# Patient Record
Sex: MALE | Race: OTHER | URBAN - METROPOLITAN AREA
[De-identification: names, ages, dates, MRNs, and addresses within clinical notes are randomized per-mention and may not be internally consistent; named-entity substitution may affect disease eponyms.]

---

## 2023-01-01 ENCOUNTER — EMERGENCY (EMERGENCY)
Facility: HOSPITAL | Age: 88
LOS: 0 days | Discharge: ANOTHER HOSPITAL | End: 2023-12-13
Attending: STUDENT IN AN ORGANIZED HEALTH CARE EDUCATION/TRAINING PROGRAM
Payer: SELF-PAY

## 2023-01-01 ENCOUNTER — INPATIENT (INPATIENT)
Facility: HOSPITAL | Age: 88
LOS: 4 days | End: 2023-12-18
Attending: STUDENT IN AN ORGANIZED HEALTH CARE EDUCATION/TRAINING PROGRAM | Admitting: STUDENT IN AN ORGANIZED HEALTH CARE EDUCATION/TRAINING PROGRAM
Payer: SELF-PAY

## 2023-01-01 VITALS
DIASTOLIC BLOOD PRESSURE: 87 MMHG | RESPIRATION RATE: 18 BRPM | SYSTOLIC BLOOD PRESSURE: 166 MMHG | OXYGEN SATURATION: 100 % | HEART RATE: 108 BPM

## 2023-01-01 VITALS — HEIGHT: 62 IN

## 2023-01-01 VITALS
DIASTOLIC BLOOD PRESSURE: 56 MMHG | HEART RATE: 157 BPM | OXYGEN SATURATION: 100 % | RESPIRATION RATE: 17 BRPM | SYSTOLIC BLOOD PRESSURE: 82 MMHG | WEIGHT: 164.91 LBS | HEIGHT: 62 IN | TEMPERATURE: 98 F

## 2023-01-01 DIAGNOSIS — I46.9 CARDIAC ARREST, CAUSE UNSPECIFIED: ICD-10-CM

## 2023-01-01 DIAGNOSIS — Z20.822 CONTACT WITH AND (SUSPECTED) EXPOSURE TO COVID-19: ICD-10-CM

## 2023-01-01 DIAGNOSIS — E78.2 MIXED HYPERLIPIDEMIA: ICD-10-CM

## 2023-01-01 DIAGNOSIS — Z87.19 PERSONAL HISTORY OF OTHER DISEASES OF THE DIGESTIVE SYSTEM: Chronic | ICD-10-CM

## 2023-01-01 DIAGNOSIS — F17.210 NICOTINE DEPENDENCE, CIGARETTES, UNCOMPLICATED: ICD-10-CM

## 2023-01-01 DIAGNOSIS — R40.4 TRANSIENT ALTERATION OF AWARENESS: ICD-10-CM

## 2023-01-01 DIAGNOSIS — E11.10 TYPE 2 DIABETES MELLITUS WITH KETOACIDOSIS WITHOUT COMA: ICD-10-CM

## 2023-01-01 DIAGNOSIS — I48.91 UNSPECIFIED ATRIAL FIBRILLATION: ICD-10-CM

## 2023-01-01 DIAGNOSIS — E78.5 HYPERLIPIDEMIA, UNSPECIFIED: ICD-10-CM

## 2023-01-01 DIAGNOSIS — E11.9 TYPE 2 DIABETES MELLITUS WITHOUT COMPLICATIONS: ICD-10-CM

## 2023-01-01 DIAGNOSIS — I10 ESSENTIAL (PRIMARY) HYPERTENSION: ICD-10-CM

## 2023-01-01 LAB
A1C WITH ESTIMATED AVERAGE GLUCOSE RESULT: 5.6 % — SIGNIFICANT CHANGE UP (ref 4–5.6)
A1C WITH ESTIMATED AVERAGE GLUCOSE RESULT: 5.6 % — SIGNIFICANT CHANGE UP (ref 4–5.6)
A1C WITH ESTIMATED AVERAGE GLUCOSE RESULT: 5.7 % — HIGH (ref 4–5.6)
A1C WITH ESTIMATED AVERAGE GLUCOSE RESULT: 5.7 % — HIGH (ref 4–5.6)
ACANTHOCYTES BLD QL SMEAR: SLIGHT — SIGNIFICANT CHANGE UP
ACANTHOCYTES BLD QL SMEAR: SLIGHT — SIGNIFICANT CHANGE UP
ALBUMIN SERPL ELPH-MCNC: 2.9 G/DL — LOW (ref 3.3–5)
ALBUMIN SERPL ELPH-MCNC: 2.9 G/DL — LOW (ref 3.3–5)
ALBUMIN SERPL ELPH-MCNC: 3.1 G/DL — LOW (ref 3.3–5)
ALBUMIN SERPL ELPH-MCNC: 3.2 G/DL — LOW (ref 3.3–5)
ALBUMIN SERPL ELPH-MCNC: 3.2 G/DL — LOW (ref 3.3–5)
ALBUMIN SERPL ELPH-MCNC: 3.4 G/DL — SIGNIFICANT CHANGE UP (ref 3.3–5)
ALBUMIN SERPL ELPH-MCNC: 3.5 G/DL — SIGNIFICANT CHANGE UP (ref 3.3–5)
ALBUMIN SERPL ELPH-MCNC: 3.5 G/DL — SIGNIFICANT CHANGE UP (ref 3.3–5)
ALP SERPL-CCNC: 33 U/L — LOW (ref 40–120)
ALP SERPL-CCNC: 33 U/L — LOW (ref 40–120)
ALP SERPL-CCNC: 34 U/L — LOW (ref 40–120)
ALP SERPL-CCNC: 34 U/L — LOW (ref 40–120)
ALP SERPL-CCNC: 36 U/L — LOW (ref 40–120)
ALP SERPL-CCNC: 36 U/L — LOW (ref 40–120)
ALP SERPL-CCNC: 54 U/L — SIGNIFICANT CHANGE UP (ref 40–120)
ALP SERPL-CCNC: 54 U/L — SIGNIFICANT CHANGE UP (ref 40–120)
ALP SERPL-CCNC: 55 U/L — SIGNIFICANT CHANGE UP (ref 40–120)
ALP SERPL-CCNC: 55 U/L — SIGNIFICANT CHANGE UP (ref 40–120)
ALP SERPL-CCNC: 63 U/L — SIGNIFICANT CHANGE UP (ref 40–120)
ALP SERPL-CCNC: 63 U/L — SIGNIFICANT CHANGE UP (ref 40–120)
ALP SERPL-CCNC: 65 U/L — SIGNIFICANT CHANGE UP (ref 40–120)
ALP SERPL-CCNC: 65 U/L — SIGNIFICANT CHANGE UP (ref 40–120)
ALP SERPL-CCNC: 66 U/L — SIGNIFICANT CHANGE UP (ref 40–120)
ALP SERPL-CCNC: 66 U/L — SIGNIFICANT CHANGE UP (ref 40–120)
ALP SERPL-CCNC: 81 U/L — SIGNIFICANT CHANGE UP (ref 40–120)
ALP SERPL-CCNC: 81 U/L — SIGNIFICANT CHANGE UP (ref 40–120)
ALT FLD-CCNC: 123 U/L — HIGH (ref 4–41)
ALT FLD-CCNC: 123 U/L — HIGH (ref 4–41)
ALT FLD-CCNC: 1457 U/L — HIGH (ref 4–41)
ALT FLD-CCNC: 1457 U/L — HIGH (ref 4–41)
ALT FLD-CCNC: 163 U/L — HIGH (ref 4–41)
ALT FLD-CCNC: 163 U/L — HIGH (ref 4–41)
ALT FLD-CCNC: 21 U/L — SIGNIFICANT CHANGE UP (ref 12–78)
ALT FLD-CCNC: 21 U/L — SIGNIFICANT CHANGE UP (ref 12–78)
ALT FLD-CCNC: 221 U/L — HIGH (ref 4–41)
ALT FLD-CCNC: 221 U/L — HIGH (ref 4–41)
ALT FLD-CCNC: 47 U/L — HIGH (ref 4–41)
ALT FLD-CCNC: 47 U/L — HIGH (ref 4–41)
ALT FLD-CCNC: 53 U/L — HIGH (ref 4–41)
ALT FLD-CCNC: 53 U/L — HIGH (ref 4–41)
ALT FLD-CCNC: 74 U/L — HIGH (ref 4–41)
ALT FLD-CCNC: 74 U/L — HIGH (ref 4–41)
ALT FLD-CCNC: 96 U/L — HIGH (ref 4–41)
ALT FLD-CCNC: 96 U/L — HIGH (ref 4–41)
ANION GAP SERPL CALC-SCNC: 11 MMOL/L — SIGNIFICANT CHANGE UP (ref 7–14)
ANION GAP SERPL CALC-SCNC: 11 MMOL/L — SIGNIFICANT CHANGE UP (ref 7–14)
ANION GAP SERPL CALC-SCNC: 13 MMOL/L — SIGNIFICANT CHANGE UP (ref 7–14)
ANION GAP SERPL CALC-SCNC: 13 MMOL/L — SIGNIFICANT CHANGE UP (ref 7–14)
ANION GAP SERPL CALC-SCNC: 14 MMOL/L — SIGNIFICANT CHANGE UP (ref 7–14)
ANION GAP SERPL CALC-SCNC: 15 MMOL/L — HIGH (ref 7–14)
ANION GAP SERPL CALC-SCNC: 15 MMOL/L — SIGNIFICANT CHANGE UP (ref 5–17)
ANION GAP SERPL CALC-SCNC: 15 MMOL/L — SIGNIFICANT CHANGE UP (ref 5–17)
ANION GAP SERPL CALC-SCNC: 16 MMOL/L — HIGH (ref 7–14)
ANION GAP SERPL CALC-SCNC: 17 MMOL/L — HIGH (ref 7–14)
ANION GAP SERPL CALC-SCNC: 18 MMOL/L — HIGH (ref 7–14)
ANION GAP SERPL CALC-SCNC: 18 MMOL/L — HIGH (ref 7–14)
ANION GAP SERPL CALC-SCNC: 19 MMOL/L — HIGH (ref 7–14)
ANION GAP SERPL CALC-SCNC: 19 MMOL/L — HIGH (ref 7–14)
ANION GAP SERPL CALC-SCNC: 20 MMOL/L — HIGH (ref 7–14)
ANION GAP SERPL CALC-SCNC: 20 MMOL/L — HIGH (ref 7–14)
ANION GAP SERPL CALC-SCNC: 22 MMOL/L — HIGH (ref 7–14)
ANION GAP SERPL CALC-SCNC: 22 MMOL/L — HIGH (ref 7–14)
ANION GAP SERPL CALC-SCNC: 25 MMOL/L — HIGH (ref 7–14)
ANION GAP SERPL CALC-SCNC: 25 MMOL/L — HIGH (ref 7–14)
ANION GAP SERPL CALC-SCNC: 32 MMOL/L — HIGH (ref 7–14)
ANION GAP SERPL CALC-SCNC: 32 MMOL/L — HIGH (ref 7–14)
ANISOCYTOSIS BLD QL: SLIGHT — SIGNIFICANT CHANGE UP
ANISOCYTOSIS BLD QL: SLIGHT — SIGNIFICANT CHANGE UP
APPEARANCE UR: CLEAR — SIGNIFICANT CHANGE UP
APTT BLD: 30.2 SEC — SIGNIFICANT CHANGE UP (ref 24.5–35.6)
APTT BLD: 30.2 SEC — SIGNIFICANT CHANGE UP (ref 24.5–35.6)
APTT BLD: 30.6 SEC — SIGNIFICANT CHANGE UP (ref 24.5–35.6)
APTT BLD: 30.6 SEC — SIGNIFICANT CHANGE UP (ref 24.5–35.6)
APTT BLD: 64.7 SEC — HIGH (ref 24.5–35.6)
APTT BLD: 64.7 SEC — HIGH (ref 24.5–35.6)
AST SERPL-CCNC: 109 U/L — HIGH (ref 4–40)
AST SERPL-CCNC: 109 U/L — HIGH (ref 4–40)
AST SERPL-CCNC: 2053 U/L — HIGH (ref 4–40)
AST SERPL-CCNC: 2053 U/L — HIGH (ref 4–40)
AST SERPL-CCNC: 231 U/L — HIGH (ref 4–40)
AST SERPL-CCNC: 231 U/L — HIGH (ref 4–40)
AST SERPL-CCNC: 259 U/L — HIGH (ref 4–40)
AST SERPL-CCNC: 259 U/L — HIGH (ref 4–40)
AST SERPL-CCNC: 304 U/L — HIGH (ref 4–40)
AST SERPL-CCNC: 304 U/L — HIGH (ref 4–40)
AST SERPL-CCNC: 41 U/L — HIGH (ref 4–40)
AST SERPL-CCNC: 41 U/L — HIGH (ref 4–40)
AST SERPL-CCNC: 43 U/L — HIGH (ref 4–40)
AST SERPL-CCNC: 43 U/L — HIGH (ref 4–40)
AST SERPL-CCNC: 48 U/L — HIGH (ref 15–37)
AST SERPL-CCNC: 48 U/L — HIGH (ref 15–37)
AST SERPL-CCNC: 65 U/L — HIGH (ref 4–40)
AST SERPL-CCNC: 65 U/L — HIGH (ref 4–40)
B PERT DNA SPEC QL NAA+PROBE: SIGNIFICANT CHANGE UP
B PERT+PARAPERT DNA PNL SPEC NAA+PROBE: SIGNIFICANT CHANGE UP
B-OH-BUTYR SERPL-SCNC: 0.4 MMOL/L — SIGNIFICANT CHANGE UP (ref 0–0.4)
B-OH-BUTYR SERPL-SCNC: 0.5 MMOL/L — HIGH (ref 0–0.4)
B-OH-BUTYR SERPL-SCNC: 0.5 MMOL/L — HIGH (ref 0–0.4)
B-OH-BUTYR SERPL-SCNC: 0.8 MMOL/L — HIGH (ref 0–0.4)
B-OH-BUTYR SERPL-SCNC: 0.8 MMOL/L — HIGH (ref 0–0.4)
B-OH-BUTYR SERPL-SCNC: 0.9 MMOL/L — HIGH (ref 0–0.4)
B-OH-BUTYR SERPL-SCNC: 0.9 MMOL/L — HIGH (ref 0–0.4)
B-OH-BUTYR SERPL-SCNC: 1.1 MMOL/L — HIGH (ref 0–0.4)
B-OH-BUTYR SERPL-SCNC: 1.2 MMOL/L — HIGH (ref 0–0.4)
B-OH-BUTYR SERPL-SCNC: 1.4 MMOL/L — HIGH (ref 0–0.4)
B-OH-BUTYR SERPL-SCNC: 1.4 MMOL/L — HIGH (ref 0–0.4)
B-OH-BUTYR SERPL-SCNC: 1.6 MMOL/L — HIGH (ref 0–0.4)
B-OH-BUTYR SERPL-SCNC: 1.6 MMOL/L — HIGH (ref 0–0.4)
B-OH-BUTYR SERPL-SCNC: 1.8 MMOL/L — HIGH (ref 0–0.4)
B-OH-BUTYR SERPL-SCNC: 2 MMOL/L — HIGH (ref 0–0.4)
B-OH-BUTYR SERPL-SCNC: 2 MMOL/L — HIGH (ref 0–0.4)
B-OH-BUTYR SERPL-SCNC: 3.5 MMOL/L — HIGH (ref 0–0.4)
B-OH-BUTYR SERPL-SCNC: 3.5 MMOL/L — HIGH (ref 0–0.4)
B-OH-BUTYR SERPL-SCNC: <0 MMOL/L — SIGNIFICANT CHANGE UP (ref 0–0.4)
BACTERIA # UR AUTO: NEGATIVE /HPF — SIGNIFICANT CHANGE UP
BASE EXCESS BLDA CALC-SCNC: -16.4 MMOL/L — LOW (ref -2–3)
BASE EXCESS BLDA CALC-SCNC: -16.4 MMOL/L — LOW (ref -2–3)
BASE EXCESS BLDA CALC-SCNC: -9.9 MMOL/L — LOW (ref -2–3)
BASOPHILS # BLD AUTO: 0 K/UL — SIGNIFICANT CHANGE UP (ref 0–0.2)
BASOPHILS # BLD AUTO: 0 K/UL — SIGNIFICANT CHANGE UP (ref 0–0.2)
BASOPHILS # BLD AUTO: 0.01 K/UL — SIGNIFICANT CHANGE UP (ref 0–0.2)
BASOPHILS # BLD AUTO: 0.01 K/UL — SIGNIFICANT CHANGE UP (ref 0–0.2)
BASOPHILS # BLD AUTO: 0.02 K/UL — SIGNIFICANT CHANGE UP (ref 0–0.2)
BASOPHILS # BLD AUTO: 0.03 K/UL — SIGNIFICANT CHANGE UP (ref 0–0.2)
BASOPHILS # BLD AUTO: 0.03 K/UL — SIGNIFICANT CHANGE UP (ref 0–0.2)
BASOPHILS # BLD AUTO: 0.04 K/UL — SIGNIFICANT CHANGE UP (ref 0–0.2)
BASOPHILS # BLD AUTO: 0.04 K/UL — SIGNIFICANT CHANGE UP (ref 0–0.2)
BASOPHILS NFR BLD AUTO: 0 % — SIGNIFICANT CHANGE UP (ref 0–2)
BASOPHILS NFR BLD AUTO: 0 % — SIGNIFICANT CHANGE UP (ref 0–2)
BASOPHILS NFR BLD AUTO: 0.1 % — SIGNIFICANT CHANGE UP (ref 0–2)
BASOPHILS NFR BLD AUTO: 0.1 % — SIGNIFICANT CHANGE UP (ref 0–2)
BASOPHILS NFR BLD AUTO: 0.2 % — SIGNIFICANT CHANGE UP (ref 0–2)
BASOPHILS NFR BLD AUTO: 0.2 % — SIGNIFICANT CHANGE UP (ref 0–2)
BASOPHILS NFR BLD AUTO: 0.3 % — SIGNIFICANT CHANGE UP (ref 0–2)
BILIRUB DIRECT SERPL-MCNC: 0.2 MG/DL — SIGNIFICANT CHANGE UP (ref 0–0.3)
BILIRUB DIRECT SERPL-MCNC: 0.2 MG/DL — SIGNIFICANT CHANGE UP (ref 0–0.3)
BILIRUB INDIRECT FLD-MCNC: 0.2 MG/DL — SIGNIFICANT CHANGE UP (ref 0–1)
BILIRUB INDIRECT FLD-MCNC: 0.2 MG/DL — SIGNIFICANT CHANGE UP (ref 0–1)
BILIRUB SERPL-MCNC: 0.4 MG/DL — SIGNIFICANT CHANGE UP (ref 0.2–1.2)
BILIRUB SERPL-MCNC: 0.5 MG/DL — SIGNIFICANT CHANGE UP (ref 0.2–1.2)
BILIRUB SERPL-MCNC: 0.5 MG/DL — SIGNIFICANT CHANGE UP (ref 0.2–1.2)
BILIRUB SERPL-MCNC: 0.7 MG/DL — SIGNIFICANT CHANGE UP (ref 0.2–1.2)
BILIRUB SERPL-MCNC: 0.8 MG/DL — SIGNIFICANT CHANGE UP (ref 0.2–1.2)
BILIRUB SERPL-MCNC: 0.8 MG/DL — SIGNIFICANT CHANGE UP (ref 0.2–1.2)
BILIRUB UR-MCNC: NEGATIVE — SIGNIFICANT CHANGE UP
BLD GP AB SCN SERPL QL: NEGATIVE — SIGNIFICANT CHANGE UP
BLD GP AB SCN SERPL QL: NEGATIVE — SIGNIFICANT CHANGE UP
BLOOD GAS ARTERIAL - LYTES,HGB,ICA,LACT RESULT: SIGNIFICANT CHANGE UP
BLOOD GAS ARTERIAL - LYTES,HGB,ICA,LACT RESULT: SIGNIFICANT CHANGE UP
BLOOD GAS ARTERIAL COMPREHENSIVE RESULT: SIGNIFICANT CHANGE UP
BLOOD GAS COMMENTS ARTERIAL: SIGNIFICANT CHANGE UP
BLOOD GAS VENOUS COMPREHENSIVE RESULT: SIGNIFICANT CHANGE UP
BLOOD GAS VENOUS COMPREHENSIVE RESULT: SIGNIFICANT CHANGE UP
BORDETELLA PARAPERTUSSIS (RAPRVP): SIGNIFICANT CHANGE UP
BUN SERPL-MCNC: 21 MG/DL — SIGNIFICANT CHANGE UP (ref 7–23)
BUN SERPL-MCNC: 21 MG/DL — SIGNIFICANT CHANGE UP (ref 7–23)
BUN SERPL-MCNC: 22 MG/DL — SIGNIFICANT CHANGE UP (ref 7–23)
BUN SERPL-MCNC: 22 MG/DL — SIGNIFICANT CHANGE UP (ref 7–23)
BUN SERPL-MCNC: 23 MG/DL — SIGNIFICANT CHANGE UP (ref 7–23)
BUN SERPL-MCNC: 24 MG/DL — HIGH (ref 7–23)
BUN SERPL-MCNC: 25 MG/DL — HIGH (ref 7–23)
BUN SERPL-MCNC: 26 MG/DL — HIGH (ref 7–23)
BUN SERPL-MCNC: 27 MG/DL — HIGH (ref 7–23)
BUN SERPL-MCNC: 27 MG/DL — HIGH (ref 7–23)
BUN SERPL-MCNC: 28 MG/DL — HIGH (ref 7–23)
BUN SERPL-MCNC: 28 MG/DL — HIGH (ref 7–23)
BUN SERPL-MCNC: 30 MG/DL — HIGH (ref 7–23)
BUN SERPL-MCNC: 30 MG/DL — HIGH (ref 7–23)
BUN SERPL-MCNC: 33 MG/DL — HIGH (ref 7–23)
BUN SERPL-MCNC: 33 MG/DL — HIGH (ref 7–23)
BUN SERPL-MCNC: 35 MG/DL — HIGH (ref 7–23)
BUN SERPL-MCNC: 35 MG/DL — HIGH (ref 7–23)
BUN SERPL-MCNC: 37 MG/DL — HIGH (ref 7–23)
BUN SERPL-MCNC: 37 MG/DL — HIGH (ref 7–23)
BUN SERPL-MCNC: 38 MG/DL — HIGH (ref 7–23)
BUN SERPL-MCNC: 38 MG/DL — HIGH (ref 7–23)
BUN SERPL-MCNC: 43 MG/DL — HIGH (ref 7–23)
BUN SERPL-MCNC: 43 MG/DL — HIGH (ref 7–23)
C PNEUM DNA SPEC QL NAA+PROBE: SIGNIFICANT CHANGE UP
CA-I BLD-SCNC: 1.16 MMOL/L — SIGNIFICANT CHANGE UP (ref 1.15–1.29)
CA-I BLD-SCNC: 1.16 MMOL/L — SIGNIFICANT CHANGE UP (ref 1.15–1.29)
CA-I BLD-SCNC: 1.2 MMOL/L — SIGNIFICANT CHANGE UP (ref 1.15–1.29)
CA-I BLD-SCNC: 1.2 MMOL/L — SIGNIFICANT CHANGE UP (ref 1.15–1.29)
CA-I BLD-SCNC: 1.22 MMOL/L — SIGNIFICANT CHANGE UP (ref 1.15–1.29)
CA-I BLD-SCNC: 1.23 MMOL/L — SIGNIFICANT CHANGE UP (ref 1.15–1.29)
CA-I BLD-SCNC: 1.23 MMOL/L — SIGNIFICANT CHANGE UP (ref 1.15–1.29)
CA-I BLD-SCNC: 1.25 MMOL/L — SIGNIFICANT CHANGE UP (ref 1.15–1.29)
CA-I BLD-SCNC: 1.25 MMOL/L — SIGNIFICANT CHANGE UP (ref 1.15–1.29)
CA-I BLD-SCNC: 1.36 MMOL/L — HIGH (ref 1.15–1.29)
CA-I BLD-SCNC: 1.36 MMOL/L — HIGH (ref 1.15–1.29)
CALCIUM SERPL-MCNC: 10.7 MG/DL — HIGH (ref 8.4–10.5)
CALCIUM SERPL-MCNC: 10.7 MG/DL — HIGH (ref 8.4–10.5)
CALCIUM SERPL-MCNC: 8.6 MG/DL — SIGNIFICANT CHANGE UP (ref 8.4–10.5)
CALCIUM SERPL-MCNC: 8.6 MG/DL — SIGNIFICANT CHANGE UP (ref 8.4–10.5)
CALCIUM SERPL-MCNC: 8.7 MG/DL — SIGNIFICANT CHANGE UP (ref 8.4–10.5)
CALCIUM SERPL-MCNC: 8.8 MG/DL — SIGNIFICANT CHANGE UP (ref 8.4–10.5)
CALCIUM SERPL-MCNC: 8.9 MG/DL — SIGNIFICANT CHANGE UP (ref 8.4–10.5)
CALCIUM SERPL-MCNC: 9 MG/DL — SIGNIFICANT CHANGE UP (ref 8.4–10.5)
CALCIUM SERPL-MCNC: 9.1 MG/DL — SIGNIFICANT CHANGE UP (ref 8.4–10.5)
CALCIUM SERPL-MCNC: 9.2 MG/DL — SIGNIFICANT CHANGE UP (ref 8.4–10.5)
CALCIUM SERPL-MCNC: 9.3 MG/DL — SIGNIFICANT CHANGE UP (ref 8.4–10.5)
CALCIUM SERPL-MCNC: 9.9 MG/DL — SIGNIFICANT CHANGE UP (ref 8.5–10.1)
CALCIUM SERPL-MCNC: 9.9 MG/DL — SIGNIFICANT CHANGE UP (ref 8.5–10.1)
CAST: 15 /LPF — HIGH (ref 0–4)
CHLORIDE SERPL-SCNC: 104 MMOL/L — SIGNIFICANT CHANGE UP (ref 98–107)
CHLORIDE SERPL-SCNC: 105 MMOL/L — SIGNIFICANT CHANGE UP (ref 96–108)
CHLORIDE SERPL-SCNC: 105 MMOL/L — SIGNIFICANT CHANGE UP (ref 96–108)
CHLORIDE SERPL-SCNC: 105 MMOL/L — SIGNIFICANT CHANGE UP (ref 98–107)
CHLORIDE SERPL-SCNC: 106 MMOL/L — SIGNIFICANT CHANGE UP (ref 98–107)
CHLORIDE SERPL-SCNC: 107 MMOL/L — SIGNIFICANT CHANGE UP (ref 98–107)
CHLORIDE SERPL-SCNC: 108 MMOL/L — HIGH (ref 98–107)
CHLORIDE SERPL-SCNC: 109 MMOL/L — HIGH (ref 98–107)
CHLORIDE SERPL-SCNC: 110 MMOL/L — HIGH (ref 98–107)
CHLORIDE SERPL-SCNC: 111 MMOL/L — HIGH (ref 98–107)
CHLORIDE UR-SCNC: 51 MMOL/L — SIGNIFICANT CHANGE UP
CHOLEST SERPL-MCNC: 123 MG/DL — SIGNIFICANT CHANGE UP
CHOLEST SERPL-MCNC: 123 MG/DL — SIGNIFICANT CHANGE UP
CK MB BLD-MCNC: 1.8 % — SIGNIFICANT CHANGE UP (ref 0–2.5)
CK MB BLD-MCNC: 1.8 % — SIGNIFICANT CHANGE UP (ref 0–2.5)
CK MB BLD-MCNC: 3.9 % — HIGH (ref 0–3.5)
CK MB BLD-MCNC: 3.9 % — HIGH (ref 0–3.5)
CK MB BLD-MCNC: 4.1 % — HIGH (ref 0–2.5)
CK MB BLD-MCNC: 4.1 % — HIGH (ref 0–2.5)
CK MB BLD-MCNC: 4.9 % — HIGH (ref 0–2.5)
CK MB BLD-MCNC: 4.9 % — HIGH (ref 0–2.5)
CK MB CFR SERPL CALC: 12.1 NG/ML — HIGH (ref 0.5–3.6)
CK MB CFR SERPL CALC: 12.1 NG/ML — HIGH (ref 0.5–3.6)
CK MB CFR SERPL CALC: 22.3 NG/ML — HIGH
CK MB CFR SERPL CALC: 22.3 NG/ML — HIGH
CK MB CFR SERPL CALC: 25.5 NG/ML — HIGH
CK MB CFR SERPL CALC: 25.5 NG/ML — HIGH
CK MB CFR SERPL CALC: 33.3 NG/ML — HIGH
CK MB CFR SERPL CALC: 33.3 NG/ML — HIGH
CK MB CFR SERPL CALC: 5.8 NG/ML — SIGNIFICANT CHANGE UP
CK MB CFR SERPL CALC: 5.8 NG/ML — SIGNIFICANT CHANGE UP
CK SERPL-CCNC: 308 U/L — SIGNIFICANT CHANGE UP (ref 26–308)
CK SERPL-CCNC: 308 U/L — SIGNIFICANT CHANGE UP (ref 26–308)
CK SERPL-CCNC: 315 U/L — HIGH (ref 30–200)
CK SERPL-CCNC: 315 U/L — HIGH (ref 30–200)
CK SERPL-CCNC: 539 U/L — HIGH (ref 30–200)
CK SERPL-CCNC: 539 U/L — HIGH (ref 30–200)
CK SERPL-CCNC: 682 U/L — HIGH (ref 30–200)
CK SERPL-CCNC: 682 U/L — HIGH (ref 30–200)
CK SERPL-CCNC: 888 U/L — HIGH (ref 30–200)
CK SERPL-CCNC: 888 U/L — HIGH (ref 30–200)
CO2 BLDA-SCNC: 12 MMOL/L — LOW (ref 19–24)
CO2 SERPL-SCNC: 11 MMOL/L — LOW (ref 22–31)
CO2 SERPL-SCNC: 12 MMOL/L — LOW (ref 22–31)
CO2 SERPL-SCNC: 12 MMOL/L — LOW (ref 22–31)
CO2 SERPL-SCNC: 13 MMOL/L — LOW (ref 22–31)
CO2 SERPL-SCNC: 13 MMOL/L — LOW (ref 22–31)
CO2 SERPL-SCNC: 14 MMOL/L — LOW (ref 22–31)
CO2 SERPL-SCNC: 15 MMOL/L — LOW (ref 22–31)
CO2 SERPL-SCNC: 16 MMOL/L — LOW (ref 22–31)
CO2 SERPL-SCNC: 17 MMOL/L — LOW (ref 22–31)
CO2 SERPL-SCNC: 18 MMOL/L — LOW (ref 22–31)
CO2 SERPL-SCNC: 18 MMOL/L — LOW (ref 22–31)
CO2 SERPL-SCNC: 19 MMOL/L — LOW (ref 22–31)
CO2 SERPL-SCNC: 19 MMOL/L — LOW (ref 22–31)
CO2 SERPL-SCNC: 7 MMOL/L — CRITICAL LOW (ref 22–31)
CO2 SERPL-SCNC: 7 MMOL/L — CRITICAL LOW (ref 22–31)
COLOR SPEC: YELLOW — SIGNIFICANT CHANGE UP
CORTIS F PM SERPL-MCNC: 28.5 UG/DL — HIGH (ref 2.7–10.5)
CORTIS F PM SERPL-MCNC: 28.5 UG/DL — HIGH (ref 2.7–10.5)
CREAT SERPL-MCNC: 1.82 MG/DL — HIGH (ref 0.5–1.3)
CREAT SERPL-MCNC: 1.82 MG/DL — HIGH (ref 0.5–1.3)
CREAT SERPL-MCNC: 1.85 MG/DL — HIGH (ref 0.5–1.3)
CREAT SERPL-MCNC: 1.85 MG/DL — HIGH (ref 0.5–1.3)
CREAT SERPL-MCNC: 1.87 MG/DL — HIGH (ref 0.5–1.3)
CREAT SERPL-MCNC: 1.87 MG/DL — HIGH (ref 0.5–1.3)
CREAT SERPL-MCNC: 1.88 MG/DL — HIGH (ref 0.5–1.3)
CREAT SERPL-MCNC: 1.88 MG/DL — HIGH (ref 0.5–1.3)
CREAT SERPL-MCNC: 1.91 MG/DL — HIGH (ref 0.5–1.3)
CREAT SERPL-MCNC: 1.91 MG/DL — HIGH (ref 0.5–1.3)
CREAT SERPL-MCNC: 2.05 MG/DL — HIGH (ref 0.5–1.3)
CREAT SERPL-MCNC: 2.05 MG/DL — HIGH (ref 0.5–1.3)
CREAT SERPL-MCNC: 2.1 MG/DL — HIGH (ref 0.5–1.3)
CREAT SERPL-MCNC: 2.1 MG/DL — HIGH (ref 0.5–1.3)
CREAT SERPL-MCNC: 2.12 MG/DL — HIGH (ref 0.5–1.3)
CREAT SERPL-MCNC: 2.12 MG/DL — HIGH (ref 0.5–1.3)
CREAT SERPL-MCNC: 2.15 MG/DL — HIGH (ref 0.5–1.3)
CREAT SERPL-MCNC: 2.15 MG/DL — HIGH (ref 0.5–1.3)
CREAT SERPL-MCNC: 2.19 MG/DL — HIGH (ref 0.5–1.3)
CREAT SERPL-MCNC: 2.19 MG/DL — HIGH (ref 0.5–1.3)
CREAT SERPL-MCNC: 2.22 MG/DL — HIGH (ref 0.5–1.3)
CREAT SERPL-MCNC: 2.22 MG/DL — HIGH (ref 0.5–1.3)
CREAT SERPL-MCNC: 2.37 MG/DL — HIGH (ref 0.5–1.3)
CREAT SERPL-MCNC: 2.37 MG/DL — HIGH (ref 0.5–1.3)
CREAT SERPL-MCNC: 2.38 MG/DL — HIGH (ref 0.5–1.3)
CREAT SERPL-MCNC: 2.38 MG/DL — HIGH (ref 0.5–1.3)
CREAT SERPL-MCNC: 2.4 MG/DL — HIGH (ref 0.5–1.3)
CREAT SERPL-MCNC: 2.49 MG/DL — HIGH (ref 0.5–1.3)
CREAT SERPL-MCNC: 2.49 MG/DL — HIGH (ref 0.5–1.3)
CREAT SERPL-MCNC: 2.53 MG/DL — HIGH (ref 0.5–1.3)
CREAT SERPL-MCNC: 2.61 MG/DL — HIGH (ref 0.5–1.3)
CREAT SERPL-MCNC: 2.61 MG/DL — HIGH (ref 0.5–1.3)
CREAT SERPL-MCNC: 2.62 MG/DL — HIGH (ref 0.5–1.3)
CREAT SERPL-MCNC: 2.62 MG/DL — HIGH (ref 0.5–1.3)
CREAT SERPL-MCNC: 2.66 MG/DL — HIGH (ref 0.5–1.3)
CREAT SERPL-MCNC: 2.66 MG/DL — HIGH (ref 0.5–1.3)
CREAT SERPL-MCNC: 2.73 MG/DL — HIGH (ref 0.5–1.3)
CREAT SERPL-MCNC: 2.73 MG/DL — HIGH (ref 0.5–1.3)
CREAT SERPL-MCNC: 2.84 MG/DL — HIGH (ref 0.5–1.3)
CREAT SERPL-MCNC: 2.84 MG/DL — HIGH (ref 0.5–1.3)
CREAT SERPL-MCNC: 3.35 MG/DL — HIGH (ref 0.5–1.3)
CREAT SERPL-MCNC: 3.35 MG/DL — HIGH (ref 0.5–1.3)
CULTURE RESULTS: NO GROWTH — SIGNIFICANT CHANGE UP
CULTURE RESULTS: NO GROWTH — SIGNIFICANT CHANGE UP
CULTURE RESULTS: SIGNIFICANT CHANGE UP
CULTURE RESULTS: SIGNIFICANT CHANGE UP
DIFF PNL FLD: ABNORMAL
EGFR: 17 ML/MIN/1.73M2 — LOW
EGFR: 17 ML/MIN/1.73M2 — LOW
EGFR: 21 ML/MIN/1.73M2 — LOW
EGFR: 21 ML/MIN/1.73M2 — LOW
EGFR: 22 ML/MIN/1.73M2 — LOW
EGFR: 22 ML/MIN/1.73M2 — LOW
EGFR: 23 ML/MIN/1.73M2 — LOW
EGFR: 24 ML/MIN/1.73M2 — LOW
EGFR: 25 ML/MIN/1.73M2 — LOW
EGFR: 25 ML/MIN/1.73M2 — LOW
EGFR: 26 ML/MIN/1.73M2 — LOW
EGFR: 28 ML/MIN/1.73M2 — LOW
EGFR: 28 ML/MIN/1.73M2 — LOW
EGFR: 29 ML/MIN/1.73M2 — LOW
EGFR: 30 ML/MIN/1.73M2 — LOW
EGFR: 31 ML/MIN/1.73M2 — LOW
EGFR: 31 ML/MIN/1.73M2 — LOW
EGFR: 34 ML/MIN/1.73M2 — LOW
EGFR: 35 ML/MIN/1.73M2 — LOW
EGFR: 36 ML/MIN/1.73M2 — LOW
EGFR: 36 ML/MIN/1.73M2 — LOW
EOSINOPHIL # BLD AUTO: 0 K/UL — SIGNIFICANT CHANGE UP (ref 0–0.5)
EOSINOPHIL # BLD AUTO: 0.03 K/UL — SIGNIFICANT CHANGE UP (ref 0–0.5)
EOSINOPHIL # BLD AUTO: 0.05 K/UL — SIGNIFICANT CHANGE UP (ref 0–0.5)
EOSINOPHIL # BLD AUTO: 0.05 K/UL — SIGNIFICANT CHANGE UP (ref 0–0.5)
EOSINOPHIL # BLD AUTO: 0.1 K/UL — SIGNIFICANT CHANGE UP (ref 0–0.5)
EOSINOPHIL # BLD AUTO: 0.1 K/UL — SIGNIFICANT CHANGE UP (ref 0–0.5)
EOSINOPHIL # BLD AUTO: 0.11 K/UL — SIGNIFICANT CHANGE UP (ref 0–0.5)
EOSINOPHIL # BLD AUTO: 0.11 K/UL — SIGNIFICANT CHANGE UP (ref 0–0.5)
EOSINOPHIL # BLD AUTO: 0.12 K/UL — SIGNIFICANT CHANGE UP (ref 0–0.5)
EOSINOPHIL # BLD AUTO: 0.12 K/UL — SIGNIFICANT CHANGE UP (ref 0–0.5)
EOSINOPHIL NFR BLD AUTO: 0 % — SIGNIFICANT CHANGE UP (ref 0–6)
EOSINOPHIL NFR BLD AUTO: 0.3 % — SIGNIFICANT CHANGE UP (ref 0–6)
EOSINOPHIL NFR BLD AUTO: 0.3 % — SIGNIFICANT CHANGE UP (ref 0–6)
EOSINOPHIL NFR BLD AUTO: 0.4 % — SIGNIFICANT CHANGE UP (ref 0–6)
EOSINOPHIL NFR BLD AUTO: 0.4 % — SIGNIFICANT CHANGE UP (ref 0–6)
EOSINOPHIL NFR BLD AUTO: 0.7 % — SIGNIFICANT CHANGE UP (ref 0–6)
EOSINOPHIL NFR BLD AUTO: 0.7 % — SIGNIFICANT CHANGE UP (ref 0–6)
EOSINOPHIL NFR BLD AUTO: 0.8 % — SIGNIFICANT CHANGE UP (ref 0–6)
EOSINOPHIL NFR BLD AUTO: 0.8 % — SIGNIFICANT CHANGE UP (ref 0–6)
EOSINOPHIL NFR BLD AUTO: 1.2 % — SIGNIFICANT CHANGE UP (ref 0–6)
EOSINOPHIL NFR BLD AUTO: 1.2 % — SIGNIFICANT CHANGE UP (ref 0–6)
EOSINOPHIL NFR BLD AUTO: 1.6 % — SIGNIFICANT CHANGE UP (ref 0–6)
EOSINOPHIL NFR BLD AUTO: 1.6 % — SIGNIFICANT CHANGE UP (ref 0–6)
ESTIMATED AVERAGE GLUCOSE: 114 — SIGNIFICANT CHANGE UP
ESTIMATED AVERAGE GLUCOSE: 114 — SIGNIFICANT CHANGE UP
ESTIMATED AVERAGE GLUCOSE: 117 — SIGNIFICANT CHANGE UP
ESTIMATED AVERAGE GLUCOSE: 117 — SIGNIFICANT CHANGE UP
FLUAV AG NPH QL: SIGNIFICANT CHANGE UP
FLUAV AG NPH QL: SIGNIFICANT CHANGE UP
FLUAV SUBTYP SPEC NAA+PROBE: SIGNIFICANT CHANGE UP
FLUBV AG NPH QL: SIGNIFICANT CHANGE UP
FLUBV AG NPH QL: SIGNIFICANT CHANGE UP
FLUBV RNA SPEC QL NAA+PROBE: SIGNIFICANT CHANGE UP
GAS PNL BLDA: SIGNIFICANT CHANGE UP
GIANT PLATELETS BLD QL SMEAR: PRESENT — SIGNIFICANT CHANGE UP
GIANT PLATELETS BLD QL SMEAR: PRESENT — SIGNIFICANT CHANGE UP
GLUCOSE BLDC GLUCOMTR-MCNC: 100 MG/DL — HIGH (ref 70–99)
GLUCOSE BLDC GLUCOMTR-MCNC: 102 MG/DL — HIGH (ref 70–99)
GLUCOSE BLDC GLUCOMTR-MCNC: 102 MG/DL — HIGH (ref 70–99)
GLUCOSE BLDC GLUCOMTR-MCNC: 103 MG/DL — HIGH (ref 70–99)
GLUCOSE BLDC GLUCOMTR-MCNC: 105 MG/DL — HIGH (ref 70–99)
GLUCOSE BLDC GLUCOMTR-MCNC: 105 MG/DL — HIGH (ref 70–99)
GLUCOSE BLDC GLUCOMTR-MCNC: 106 MG/DL — HIGH (ref 70–99)
GLUCOSE BLDC GLUCOMTR-MCNC: 106 MG/DL — HIGH (ref 70–99)
GLUCOSE BLDC GLUCOMTR-MCNC: 107 MG/DL — HIGH (ref 70–99)
GLUCOSE BLDC GLUCOMTR-MCNC: 107 MG/DL — HIGH (ref 70–99)
GLUCOSE BLDC GLUCOMTR-MCNC: 111 MG/DL — HIGH (ref 70–99)
GLUCOSE BLDC GLUCOMTR-MCNC: 111 MG/DL — HIGH (ref 70–99)
GLUCOSE BLDC GLUCOMTR-MCNC: 115 MG/DL — HIGH (ref 70–99)
GLUCOSE BLDC GLUCOMTR-MCNC: 115 MG/DL — HIGH (ref 70–99)
GLUCOSE BLDC GLUCOMTR-MCNC: 116 MG/DL — HIGH (ref 70–99)
GLUCOSE BLDC GLUCOMTR-MCNC: 116 MG/DL — HIGH (ref 70–99)
GLUCOSE BLDC GLUCOMTR-MCNC: 117 MG/DL — HIGH (ref 70–99)
GLUCOSE BLDC GLUCOMTR-MCNC: 117 MG/DL — HIGH (ref 70–99)
GLUCOSE BLDC GLUCOMTR-MCNC: 118 MG/DL — HIGH (ref 70–99)
GLUCOSE BLDC GLUCOMTR-MCNC: 118 MG/DL — HIGH (ref 70–99)
GLUCOSE BLDC GLUCOMTR-MCNC: 119 MG/DL — HIGH (ref 70–99)
GLUCOSE BLDC GLUCOMTR-MCNC: 119 MG/DL — HIGH (ref 70–99)
GLUCOSE BLDC GLUCOMTR-MCNC: 120 MG/DL — HIGH (ref 70–99)
GLUCOSE BLDC GLUCOMTR-MCNC: 121 MG/DL — HIGH (ref 70–99)
GLUCOSE BLDC GLUCOMTR-MCNC: 121 MG/DL — HIGH (ref 70–99)
GLUCOSE BLDC GLUCOMTR-MCNC: 123 MG/DL — HIGH (ref 70–99)
GLUCOSE BLDC GLUCOMTR-MCNC: 124 MG/DL — HIGH (ref 70–99)
GLUCOSE BLDC GLUCOMTR-MCNC: 125 MG/DL — HIGH (ref 70–99)
GLUCOSE BLDC GLUCOMTR-MCNC: 125 MG/DL — HIGH (ref 70–99)
GLUCOSE BLDC GLUCOMTR-MCNC: 126 MG/DL — HIGH (ref 70–99)
GLUCOSE BLDC GLUCOMTR-MCNC: 126 MG/DL — HIGH (ref 70–99)
GLUCOSE BLDC GLUCOMTR-MCNC: 128 MG/DL — HIGH (ref 70–99)
GLUCOSE BLDC GLUCOMTR-MCNC: 128 MG/DL — HIGH (ref 70–99)
GLUCOSE BLDC GLUCOMTR-MCNC: 129 MG/DL — HIGH (ref 70–99)
GLUCOSE BLDC GLUCOMTR-MCNC: 129 MG/DL — HIGH (ref 70–99)
GLUCOSE BLDC GLUCOMTR-MCNC: 130 MG/DL — HIGH (ref 70–99)
GLUCOSE BLDC GLUCOMTR-MCNC: 130 MG/DL — HIGH (ref 70–99)
GLUCOSE BLDC GLUCOMTR-MCNC: 131 MG/DL — HIGH (ref 70–99)
GLUCOSE BLDC GLUCOMTR-MCNC: 131 MG/DL — HIGH (ref 70–99)
GLUCOSE BLDC GLUCOMTR-MCNC: 132 MG/DL — HIGH (ref 70–99)
GLUCOSE BLDC GLUCOMTR-MCNC: 132 MG/DL — HIGH (ref 70–99)
GLUCOSE BLDC GLUCOMTR-MCNC: 138 MG/DL — HIGH (ref 70–99)
GLUCOSE BLDC GLUCOMTR-MCNC: 138 MG/DL — HIGH (ref 70–99)
GLUCOSE BLDC GLUCOMTR-MCNC: 142 MG/DL — HIGH (ref 70–99)
GLUCOSE BLDC GLUCOMTR-MCNC: 142 MG/DL — HIGH (ref 70–99)
GLUCOSE BLDC GLUCOMTR-MCNC: 144 MG/DL — HIGH (ref 70–99)
GLUCOSE BLDC GLUCOMTR-MCNC: 144 MG/DL — HIGH (ref 70–99)
GLUCOSE BLDC GLUCOMTR-MCNC: 147 MG/DL — HIGH (ref 70–99)
GLUCOSE BLDC GLUCOMTR-MCNC: 147 MG/DL — HIGH (ref 70–99)
GLUCOSE BLDC GLUCOMTR-MCNC: 149 MG/DL — HIGH (ref 70–99)
GLUCOSE BLDC GLUCOMTR-MCNC: 150 MG/DL — HIGH (ref 70–99)
GLUCOSE BLDC GLUCOMTR-MCNC: 150 MG/DL — HIGH (ref 70–99)
GLUCOSE BLDC GLUCOMTR-MCNC: 151 MG/DL — HIGH (ref 70–99)
GLUCOSE BLDC GLUCOMTR-MCNC: 151 MG/DL — HIGH (ref 70–99)
GLUCOSE BLDC GLUCOMTR-MCNC: 154 MG/DL — HIGH (ref 70–99)
GLUCOSE BLDC GLUCOMTR-MCNC: 154 MG/DL — HIGH (ref 70–99)
GLUCOSE BLDC GLUCOMTR-MCNC: 158 MG/DL — HIGH (ref 70–99)
GLUCOSE BLDC GLUCOMTR-MCNC: 158 MG/DL — HIGH (ref 70–99)
GLUCOSE BLDC GLUCOMTR-MCNC: 159 MG/DL — HIGH (ref 70–99)
GLUCOSE BLDC GLUCOMTR-MCNC: 159 MG/DL — HIGH (ref 70–99)
GLUCOSE BLDC GLUCOMTR-MCNC: 161 MG/DL — HIGH (ref 70–99)
GLUCOSE BLDC GLUCOMTR-MCNC: 161 MG/DL — HIGH (ref 70–99)
GLUCOSE BLDC GLUCOMTR-MCNC: 162 MG/DL — HIGH (ref 70–99)
GLUCOSE BLDC GLUCOMTR-MCNC: 162 MG/DL — HIGH (ref 70–99)
GLUCOSE BLDC GLUCOMTR-MCNC: 170 MG/DL — HIGH (ref 70–99)
GLUCOSE BLDC GLUCOMTR-MCNC: 171 MG/DL — HIGH (ref 70–99)
GLUCOSE BLDC GLUCOMTR-MCNC: 171 MG/DL — HIGH (ref 70–99)
GLUCOSE BLDC GLUCOMTR-MCNC: 174 MG/DL — HIGH (ref 70–99)
GLUCOSE BLDC GLUCOMTR-MCNC: 174 MG/DL — HIGH (ref 70–99)
GLUCOSE BLDC GLUCOMTR-MCNC: 177 MG/DL — HIGH (ref 70–99)
GLUCOSE BLDC GLUCOMTR-MCNC: 177 MG/DL — HIGH (ref 70–99)
GLUCOSE BLDC GLUCOMTR-MCNC: 179 MG/DL — HIGH (ref 70–99)
GLUCOSE BLDC GLUCOMTR-MCNC: 179 MG/DL — HIGH (ref 70–99)
GLUCOSE BLDC GLUCOMTR-MCNC: 180 MG/DL — HIGH (ref 70–99)
GLUCOSE BLDC GLUCOMTR-MCNC: 180 MG/DL — HIGH (ref 70–99)
GLUCOSE BLDC GLUCOMTR-MCNC: 182 MG/DL — HIGH (ref 70–99)
GLUCOSE BLDC GLUCOMTR-MCNC: 182 MG/DL — HIGH (ref 70–99)
GLUCOSE BLDC GLUCOMTR-MCNC: 183 MG/DL — HIGH (ref 70–99)
GLUCOSE BLDC GLUCOMTR-MCNC: 184 MG/DL — HIGH (ref 70–99)
GLUCOSE BLDC GLUCOMTR-MCNC: 185 MG/DL — HIGH (ref 70–99)
GLUCOSE BLDC GLUCOMTR-MCNC: 185 MG/DL — HIGH (ref 70–99)
GLUCOSE BLDC GLUCOMTR-MCNC: 186 MG/DL — HIGH (ref 70–99)
GLUCOSE BLDC GLUCOMTR-MCNC: 186 MG/DL — HIGH (ref 70–99)
GLUCOSE BLDC GLUCOMTR-MCNC: 190 MG/DL — HIGH (ref 70–99)
GLUCOSE BLDC GLUCOMTR-MCNC: 190 MG/DL — HIGH (ref 70–99)
GLUCOSE BLDC GLUCOMTR-MCNC: 191 MG/DL — HIGH (ref 70–99)
GLUCOSE BLDC GLUCOMTR-MCNC: 191 MG/DL — HIGH (ref 70–99)
GLUCOSE BLDC GLUCOMTR-MCNC: 197 MG/DL — HIGH (ref 70–99)
GLUCOSE BLDC GLUCOMTR-MCNC: 197 MG/DL — HIGH (ref 70–99)
GLUCOSE BLDC GLUCOMTR-MCNC: 200 MG/DL — HIGH (ref 70–99)
GLUCOSE BLDC GLUCOMTR-MCNC: 201 MG/DL — HIGH (ref 70–99)
GLUCOSE BLDC GLUCOMTR-MCNC: 201 MG/DL — HIGH (ref 70–99)
GLUCOSE BLDC GLUCOMTR-MCNC: 205 MG/DL — HIGH (ref 70–99)
GLUCOSE BLDC GLUCOMTR-MCNC: 205 MG/DL — HIGH (ref 70–99)
GLUCOSE BLDC GLUCOMTR-MCNC: 206 MG/DL — HIGH (ref 70–99)
GLUCOSE BLDC GLUCOMTR-MCNC: 206 MG/DL — HIGH (ref 70–99)
GLUCOSE BLDC GLUCOMTR-MCNC: 208 MG/DL — HIGH (ref 70–99)
GLUCOSE BLDC GLUCOMTR-MCNC: 211 MG/DL — HIGH (ref 70–99)
GLUCOSE BLDC GLUCOMTR-MCNC: 211 MG/DL — HIGH (ref 70–99)
GLUCOSE BLDC GLUCOMTR-MCNC: 66 MG/DL — LOW (ref 70–99)
GLUCOSE BLDC GLUCOMTR-MCNC: 66 MG/DL — LOW (ref 70–99)
GLUCOSE BLDC GLUCOMTR-MCNC: 68 MG/DL — LOW (ref 70–99)
GLUCOSE BLDC GLUCOMTR-MCNC: 68 MG/DL — LOW (ref 70–99)
GLUCOSE BLDC GLUCOMTR-MCNC: 74 MG/DL — SIGNIFICANT CHANGE UP (ref 70–99)
GLUCOSE BLDC GLUCOMTR-MCNC: 74 MG/DL — SIGNIFICANT CHANGE UP (ref 70–99)
GLUCOSE BLDC GLUCOMTR-MCNC: 89 MG/DL — SIGNIFICANT CHANGE UP (ref 70–99)
GLUCOSE BLDC GLUCOMTR-MCNC: 89 MG/DL — SIGNIFICANT CHANGE UP (ref 70–99)
GLUCOSE BLDC GLUCOMTR-MCNC: 92 MG/DL — SIGNIFICANT CHANGE UP (ref 70–99)
GLUCOSE BLDC GLUCOMTR-MCNC: 92 MG/DL — SIGNIFICANT CHANGE UP (ref 70–99)
GLUCOSE BLDC GLUCOMTR-MCNC: 94 MG/DL — SIGNIFICANT CHANGE UP (ref 70–99)
GLUCOSE BLDC GLUCOMTR-MCNC: 96 MG/DL — SIGNIFICANT CHANGE UP (ref 70–99)
GLUCOSE BLDC GLUCOMTR-MCNC: 96 MG/DL — SIGNIFICANT CHANGE UP (ref 70–99)
GLUCOSE BLDC GLUCOMTR-MCNC: 97 MG/DL — SIGNIFICANT CHANGE UP (ref 70–99)
GLUCOSE BLDC GLUCOMTR-MCNC: 97 MG/DL — SIGNIFICANT CHANGE UP (ref 70–99)
GLUCOSE SERPL-MCNC: 113 MG/DL — HIGH (ref 70–99)
GLUCOSE SERPL-MCNC: 115 MG/DL — HIGH (ref 70–99)
GLUCOSE SERPL-MCNC: 118 MG/DL — HIGH (ref 70–99)
GLUCOSE SERPL-MCNC: 118 MG/DL — HIGH (ref 70–99)
GLUCOSE SERPL-MCNC: 124 MG/DL — HIGH (ref 70–99)
GLUCOSE SERPL-MCNC: 124 MG/DL — HIGH (ref 70–99)
GLUCOSE SERPL-MCNC: 127 MG/DL — HIGH (ref 70–99)
GLUCOSE SERPL-MCNC: 127 MG/DL — HIGH (ref 70–99)
GLUCOSE SERPL-MCNC: 131 MG/DL — HIGH (ref 70–99)
GLUCOSE SERPL-MCNC: 131 MG/DL — HIGH (ref 70–99)
GLUCOSE SERPL-MCNC: 137 MG/DL — HIGH (ref 70–99)
GLUCOSE SERPL-MCNC: 137 MG/DL — HIGH (ref 70–99)
GLUCOSE SERPL-MCNC: 139 MG/DL — HIGH (ref 70–99)
GLUCOSE SERPL-MCNC: 139 MG/DL — HIGH (ref 70–99)
GLUCOSE SERPL-MCNC: 142 MG/DL — HIGH (ref 70–99)
GLUCOSE SERPL-MCNC: 142 MG/DL — HIGH (ref 70–99)
GLUCOSE SERPL-MCNC: 153 MG/DL — HIGH (ref 70–99)
GLUCOSE SERPL-MCNC: 153 MG/DL — HIGH (ref 70–99)
GLUCOSE SERPL-MCNC: 155 MG/DL — HIGH (ref 70–99)
GLUCOSE SERPL-MCNC: 155 MG/DL — HIGH (ref 70–99)
GLUCOSE SERPL-MCNC: 157 MG/DL — HIGH (ref 70–99)
GLUCOSE SERPL-MCNC: 157 MG/DL — HIGH (ref 70–99)
GLUCOSE SERPL-MCNC: 162 MG/DL — HIGH (ref 70–99)
GLUCOSE SERPL-MCNC: 162 MG/DL — HIGH (ref 70–99)
GLUCOSE SERPL-MCNC: 164 MG/DL — HIGH (ref 70–99)
GLUCOSE SERPL-MCNC: 164 MG/DL — HIGH (ref 70–99)
GLUCOSE SERPL-MCNC: 178 MG/DL — HIGH (ref 70–99)
GLUCOSE SERPL-MCNC: 178 MG/DL — HIGH (ref 70–99)
GLUCOSE SERPL-MCNC: 196 MG/DL — HIGH (ref 70–99)
GLUCOSE SERPL-MCNC: 196 MG/DL — HIGH (ref 70–99)
GLUCOSE SERPL-MCNC: 197 MG/DL — HIGH (ref 70–99)
GLUCOSE SERPL-MCNC: 197 MG/DL — HIGH (ref 70–99)
GLUCOSE SERPL-MCNC: 199 MG/DL — HIGH (ref 70–99)
GLUCOSE SERPL-MCNC: 210 MG/DL — HIGH (ref 70–99)
GLUCOSE SERPL-MCNC: 210 MG/DL — HIGH (ref 70–99)
GLUCOSE SERPL-MCNC: 82 MG/DL — SIGNIFICANT CHANGE UP (ref 70–99)
GLUCOSE SERPL-MCNC: 82 MG/DL — SIGNIFICANT CHANGE UP (ref 70–99)
GLUCOSE SERPL-MCNC: 98 MG/DL — SIGNIFICANT CHANGE UP (ref 70–99)
GLUCOSE SERPL-MCNC: 98 MG/DL — SIGNIFICANT CHANGE UP (ref 70–99)
GLUCOSE UR QL: >=1000 MG/DL
HADV DNA SPEC QL NAA+PROBE: SIGNIFICANT CHANGE UP
HCO3 BLDA-SCNC: 11 MMOL/L — LOW (ref 21–28)
HCO3 BLDA-SCNC: 11 MMOL/L — LOW (ref 21–28)
HCO3 BLDA-SCNC: 12 MMOL/L — LOW (ref 21–28)
HCOV 229E RNA SPEC QL NAA+PROBE: SIGNIFICANT CHANGE UP
HCOV HKU1 RNA SPEC QL NAA+PROBE: SIGNIFICANT CHANGE UP
HCOV NL63 RNA SPEC QL NAA+PROBE: SIGNIFICANT CHANGE UP
HCOV OC43 RNA SPEC QL NAA+PROBE: SIGNIFICANT CHANGE UP
HCT VFR BLD CALC: 20.9 % — CRITICAL LOW (ref 39–50)
HCT VFR BLD CALC: 20.9 % — CRITICAL LOW (ref 39–50)
HCT VFR BLD CALC: 21.8 % — LOW (ref 39–50)
HCT VFR BLD CALC: 21.8 % — LOW (ref 39–50)
HCT VFR BLD CALC: 22.4 % — LOW (ref 39–50)
HCT VFR BLD CALC: 22.4 % — LOW (ref 39–50)
HCT VFR BLD CALC: 23.3 % — LOW (ref 39–50)
HCT VFR BLD CALC: 23.3 % — LOW (ref 39–50)
HCT VFR BLD CALC: 23.8 % — LOW (ref 39–50)
HCT VFR BLD CALC: 23.8 % — LOW (ref 39–50)
HCT VFR BLD CALC: 24.4 % — LOW (ref 39–50)
HCT VFR BLD CALC: 24.4 % — LOW (ref 39–50)
HCT VFR BLD CALC: 26.2 % — LOW (ref 39–50)
HCT VFR BLD CALC: 26.2 % — LOW (ref 39–50)
HCT VFR BLD CALC: 26.4 % — LOW (ref 39–50)
HCT VFR BLD CALC: 26.4 % — LOW (ref 39–50)
HCT VFR BLD CALC: 26.9 % — LOW (ref 39–50)
HCT VFR BLD CALC: 26.9 % — LOW (ref 39–50)
HDLC SERPL-MCNC: 33 MG/DL — LOW
HDLC SERPL-MCNC: 33 MG/DL — LOW
HGB BLD-MCNC: 6.9 G/DL — CRITICAL LOW (ref 13–17)
HGB BLD-MCNC: 6.9 G/DL — CRITICAL LOW (ref 13–17)
HGB BLD-MCNC: 7.5 G/DL — LOW (ref 13–17)
HGB BLD-MCNC: 7.5 G/DL — LOW (ref 13–17)
HGB BLD-MCNC: 7.6 G/DL — LOW (ref 13–17)
HGB BLD-MCNC: 7.6 G/DL — LOW (ref 13–17)
HGB BLD-MCNC: 7.7 G/DL — LOW (ref 13–17)
HGB BLD-MCNC: 7.7 G/DL — LOW (ref 13–17)
HGB BLD-MCNC: 8 G/DL — LOW (ref 13–17)
HGB BLD-MCNC: 8 G/DL — LOW (ref 13–17)
HGB BLD-MCNC: 8.2 G/DL — LOW (ref 13–17)
HGB BLD-MCNC: 8.3 G/DL — LOW (ref 13–17)
HGB BLD-MCNC: 8.3 G/DL — LOW (ref 13–17)
HGB BLD-MCNC: 8.6 G/DL — LOW (ref 13–17)
HGB BLD-MCNC: 8.6 G/DL — LOW (ref 13–17)
HMPV RNA SPEC QL NAA+PROBE: SIGNIFICANT CHANGE UP
HOROWITZ INDEX BLDA+IHG-RTO: 100 — SIGNIFICANT CHANGE UP
HOROWITZ INDEX BLDA+IHG-RTO: 100 — SIGNIFICANT CHANGE UP
HOROWITZ INDEX BLDA+IHG-RTO: 50 — SIGNIFICANT CHANGE UP
HPIV1 RNA SPEC QL NAA+PROBE: SIGNIFICANT CHANGE UP
HPIV2 RNA SPEC QL NAA+PROBE: SIGNIFICANT CHANGE UP
HPIV3 RNA SPEC QL NAA+PROBE: SIGNIFICANT CHANGE UP
HPIV4 RNA SPEC QL NAA+PROBE: SIGNIFICANT CHANGE UP
IANC: 10.7 K/UL — HIGH (ref 1.8–7.4)
IANC: 10.7 K/UL — HIGH (ref 1.8–7.4)
IANC: 3.85 K/UL — SIGNIFICANT CHANGE UP (ref 1.8–7.4)
IANC: 3.85 K/UL — SIGNIFICANT CHANGE UP (ref 1.8–7.4)
IANC: 4.94 K/UL — SIGNIFICANT CHANGE UP (ref 1.8–7.4)
IANC: 4.94 K/UL — SIGNIFICANT CHANGE UP (ref 1.8–7.4)
IANC: 5.23 K/UL — SIGNIFICANT CHANGE UP (ref 1.8–7.4)
IANC: 5.23 K/UL — SIGNIFICANT CHANGE UP (ref 1.8–7.4)
IANC: 6.34 K/UL — SIGNIFICANT CHANGE UP (ref 1.8–7.4)
IANC: 6.34 K/UL — SIGNIFICANT CHANGE UP (ref 1.8–7.4)
IANC: 6.48 K/UL — SIGNIFICANT CHANGE UP (ref 1.8–7.4)
IANC: 6.48 K/UL — SIGNIFICANT CHANGE UP (ref 1.8–7.4)
IANC: 7.01 K/UL — SIGNIFICANT CHANGE UP (ref 1.8–7.4)
IANC: 7.01 K/UL — SIGNIFICANT CHANGE UP (ref 1.8–7.4)
IMM GRANULOCYTES NFR BLD AUTO: 0.3 % — SIGNIFICANT CHANGE UP (ref 0–0.9)
IMM GRANULOCYTES NFR BLD AUTO: 0.5 % — SIGNIFICANT CHANGE UP (ref 0–0.9)
IMM GRANULOCYTES NFR BLD AUTO: 0.5 % — SIGNIFICANT CHANGE UP (ref 0–0.9)
IMM GRANULOCYTES NFR BLD AUTO: 0.6 % — SIGNIFICANT CHANGE UP (ref 0–0.9)
IMM GRANULOCYTES NFR BLD AUTO: 0.6 % — SIGNIFICANT CHANGE UP (ref 0–0.9)
IMM GRANULOCYTES NFR BLD AUTO: 0.8 % — SIGNIFICANT CHANGE UP (ref 0–0.9)
IMM GRANULOCYTES NFR BLD AUTO: 0.8 % — SIGNIFICANT CHANGE UP (ref 0–0.9)
IMM GRANULOCYTES NFR BLD AUTO: 1 % — HIGH (ref 0–0.9)
IMM GRANULOCYTES NFR BLD AUTO: 1 % — HIGH (ref 0–0.9)
IMM GRANULOCYTES NFR BLD AUTO: 1.1 % — HIGH (ref 0–0.9)
IMM GRANULOCYTES NFR BLD AUTO: 1.1 % — HIGH (ref 0–0.9)
INR BLD: 0.95 RATIO — SIGNIFICANT CHANGE UP (ref 0.85–1.18)
INR BLD: 0.95 RATIO — SIGNIFICANT CHANGE UP (ref 0.85–1.18)
INR BLD: 0.97 RATIO — SIGNIFICANT CHANGE UP (ref 0.85–1.18)
INR BLD: 0.97 RATIO — SIGNIFICANT CHANGE UP (ref 0.85–1.18)
INR BLD: 1.01 RATIO — SIGNIFICANT CHANGE UP (ref 0.85–1.18)
INR BLD: 1.01 RATIO — SIGNIFICANT CHANGE UP (ref 0.85–1.18)
INR BLD: 1.05 RATIO — SIGNIFICANT CHANGE UP (ref 0.85–1.18)
INR BLD: 1.05 RATIO — SIGNIFICANT CHANGE UP (ref 0.85–1.18)
INR BLD: 1.09 RATIO — SIGNIFICANT CHANGE UP (ref 0.85–1.18)
INR BLD: 1.09 RATIO — SIGNIFICANT CHANGE UP (ref 0.85–1.18)
INR BLD: 1.13 RATIO — SIGNIFICANT CHANGE UP (ref 0.85–1.18)
INR BLD: 1.13 RATIO — SIGNIFICANT CHANGE UP (ref 0.85–1.18)
INR BLD: 1.15 RATIO — SIGNIFICANT CHANGE UP (ref 0.85–1.18)
INR BLD: 1.15 RATIO — SIGNIFICANT CHANGE UP (ref 0.85–1.18)
INR BLD: 1.19 RATIO — HIGH (ref 0.85–1.18)
INR BLD: 1.19 RATIO — HIGH (ref 0.85–1.18)
INR BLD: 1.3 RATIO — HIGH (ref 0.85–1.18)
INR BLD: 1.3 RATIO — HIGH (ref 0.85–1.18)
KETONES UR-MCNC: 15 MG/DL
LEUKOCYTE ESTERASE UR-ACNC: NEGATIVE — SIGNIFICANT CHANGE UP
LIPID PNL WITH DIRECT LDL SERPL: 37 MG/DL — SIGNIFICANT CHANGE UP
LIPID PNL WITH DIRECT LDL SERPL: 37 MG/DL — SIGNIFICANT CHANGE UP
LYMPHOCYTES # BLD AUTO: 0.62 K/UL — LOW (ref 1–3.3)
LYMPHOCYTES # BLD AUTO: 0.62 K/UL — LOW (ref 1–3.3)
LYMPHOCYTES # BLD AUTO: 1.06 K/UL — SIGNIFICANT CHANGE UP (ref 1–3.3)
LYMPHOCYTES # BLD AUTO: 1.06 K/UL — SIGNIFICANT CHANGE UP (ref 1–3.3)
LYMPHOCYTES # BLD AUTO: 1.12 K/UL — SIGNIFICANT CHANGE UP (ref 1–3.3)
LYMPHOCYTES # BLD AUTO: 1.12 K/UL — SIGNIFICANT CHANGE UP (ref 1–3.3)
LYMPHOCYTES # BLD AUTO: 1.48 K/UL — SIGNIFICANT CHANGE UP (ref 1–3.3)
LYMPHOCYTES # BLD AUTO: 1.48 K/UL — SIGNIFICANT CHANGE UP (ref 1–3.3)
LYMPHOCYTES # BLD AUTO: 1.55 K/UL — SIGNIFICANT CHANGE UP (ref 1–3.3)
LYMPHOCYTES # BLD AUTO: 1.55 K/UL — SIGNIFICANT CHANGE UP (ref 1–3.3)
LYMPHOCYTES # BLD AUTO: 1.57 K/UL — SIGNIFICANT CHANGE UP (ref 1–3.3)
LYMPHOCYTES # BLD AUTO: 1.57 K/UL — SIGNIFICANT CHANGE UP (ref 1–3.3)
LYMPHOCYTES # BLD AUTO: 1.65 K/UL — SIGNIFICANT CHANGE UP (ref 1–3.3)
LYMPHOCYTES # BLD AUTO: 1.65 K/UL — SIGNIFICANT CHANGE UP (ref 1–3.3)
LYMPHOCYTES # BLD AUTO: 15.6 % — SIGNIFICANT CHANGE UP (ref 13–44)
LYMPHOCYTES # BLD AUTO: 15.6 % — SIGNIFICANT CHANGE UP (ref 13–44)
LYMPHOCYTES # BLD AUTO: 15.9 % — SIGNIFICANT CHANGE UP (ref 13–44)
LYMPHOCYTES # BLD AUTO: 15.9 % — SIGNIFICANT CHANGE UP (ref 13–44)
LYMPHOCYTES # BLD AUTO: 18.6 % — SIGNIFICANT CHANGE UP (ref 13–44)
LYMPHOCYTES # BLD AUTO: 18.6 % — SIGNIFICANT CHANGE UP (ref 13–44)
LYMPHOCYTES # BLD AUTO: 2.14 K/UL — SIGNIFICANT CHANGE UP (ref 1–3.3)
LYMPHOCYTES # BLD AUTO: 2.14 K/UL — SIGNIFICANT CHANGE UP (ref 1–3.3)
LYMPHOCYTES # BLD AUTO: 20 % — SIGNIFICANT CHANGE UP (ref 13–44)
LYMPHOCYTES # BLD AUTO: 20 % — SIGNIFICANT CHANGE UP (ref 13–44)
LYMPHOCYTES # BLD AUTO: 21.7 % — SIGNIFICANT CHANGE UP (ref 13–44)
LYMPHOCYTES # BLD AUTO: 21.7 % — SIGNIFICANT CHANGE UP (ref 13–44)
LYMPHOCYTES # BLD AUTO: 25.1 % — SIGNIFICANT CHANGE UP (ref 13–44)
LYMPHOCYTES # BLD AUTO: 25.1 % — SIGNIFICANT CHANGE UP (ref 13–44)
LYMPHOCYTES # BLD AUTO: 5 % — LOW (ref 13–44)
LYMPHOCYTES # BLD AUTO: 5 % — LOW (ref 13–44)
LYMPHOCYTES # BLD AUTO: 7.4 % — LOW (ref 13–44)
LYMPHOCYTES # BLD AUTO: 7.4 % — LOW (ref 13–44)
M PNEUMO DNA SPEC QL NAA+PROBE: SIGNIFICANT CHANGE UP
MACROCYTES BLD QL: SLIGHT — SIGNIFICANT CHANGE UP
MACROCYTES BLD QL: SLIGHT — SIGNIFICANT CHANGE UP
MAGNESIUM SERPL-MCNC: 1.4 MG/DL — LOW (ref 1.6–2.6)
MAGNESIUM SERPL-MCNC: 1.8 MG/DL — SIGNIFICANT CHANGE UP (ref 1.6–2.6)
MAGNESIUM SERPL-MCNC: 1.8 MG/DL — SIGNIFICANT CHANGE UP (ref 1.6–2.6)
MAGNESIUM SERPL-MCNC: 1.9 MG/DL — SIGNIFICANT CHANGE UP (ref 1.6–2.6)
MAGNESIUM SERPL-MCNC: 2 MG/DL — SIGNIFICANT CHANGE UP (ref 1.6–2.6)
MAGNESIUM SERPL-MCNC: 2.1 MG/DL — SIGNIFICANT CHANGE UP (ref 1.6–2.6)
MAGNESIUM SERPL-MCNC: 2.2 MG/DL — SIGNIFICANT CHANGE UP (ref 1.6–2.6)
MAGNESIUM SERPL-MCNC: 2.6 MG/DL — SIGNIFICANT CHANGE UP (ref 1.6–2.6)
MAGNESIUM SERPL-MCNC: 2.6 MG/DL — SIGNIFICANT CHANGE UP (ref 1.6–2.6)
MCHC RBC-ENTMCNC: 28.6 PG — SIGNIFICANT CHANGE UP (ref 27–34)
MCHC RBC-ENTMCNC: 28.6 PG — SIGNIFICANT CHANGE UP (ref 27–34)
MCHC RBC-ENTMCNC: 28.8 PG — SIGNIFICANT CHANGE UP (ref 27–34)
MCHC RBC-ENTMCNC: 28.8 PG — SIGNIFICANT CHANGE UP (ref 27–34)
MCHC RBC-ENTMCNC: 29.1 PG — SIGNIFICANT CHANGE UP (ref 27–34)
MCHC RBC-ENTMCNC: 29.1 PG — SIGNIFICANT CHANGE UP (ref 27–34)
MCHC RBC-ENTMCNC: 29.2 PG — SIGNIFICANT CHANGE UP (ref 27–34)
MCHC RBC-ENTMCNC: 29.2 PG — SIGNIFICANT CHANGE UP (ref 27–34)
MCHC RBC-ENTMCNC: 29.3 PG — SIGNIFICANT CHANGE UP (ref 27–34)
MCHC RBC-ENTMCNC: 29.3 PG — SIGNIFICANT CHANGE UP (ref 27–34)
MCHC RBC-ENTMCNC: 29.5 PG — SIGNIFICANT CHANGE UP (ref 27–34)
MCHC RBC-ENTMCNC: 29.5 PG — SIGNIFICANT CHANGE UP (ref 27–34)
MCHC RBC-ENTMCNC: 29.7 PG — SIGNIFICANT CHANGE UP (ref 27–34)
MCHC RBC-ENTMCNC: 30 PG — SIGNIFICANT CHANGE UP (ref 27–34)
MCHC RBC-ENTMCNC: 30 PG — SIGNIFICANT CHANGE UP (ref 27–34)
MCHC RBC-ENTMCNC: 30.5 GM/DL — LOW (ref 32–36)
MCHC RBC-ENTMCNC: 30.5 GM/DL — LOW (ref 32–36)
MCHC RBC-ENTMCNC: 31.7 GM/DL — LOW (ref 32–36)
MCHC RBC-ENTMCNC: 31.7 GM/DL — LOW (ref 32–36)
MCHC RBC-ENTMCNC: 32.6 G/DL — SIGNIFICANT CHANGE UP (ref 32–36)
MCHC RBC-ENTMCNC: 32.6 G/DL — SIGNIFICANT CHANGE UP (ref 32–36)
MCHC RBC-ENTMCNC: 33 GM/DL — SIGNIFICANT CHANGE UP (ref 32–36)
MCHC RBC-ENTMCNC: 33.5 GM/DL — SIGNIFICANT CHANGE UP (ref 32–36)
MCHC RBC-ENTMCNC: 33.5 GM/DL — SIGNIFICANT CHANGE UP (ref 32–36)
MCHC RBC-ENTMCNC: 33.6 GM/DL — SIGNIFICANT CHANGE UP (ref 32–36)
MCHC RBC-ENTMCNC: 34.9 GM/DL — SIGNIFICANT CHANGE UP (ref 32–36)
MCHC RBC-ENTMCNC: 34.9 GM/DL — SIGNIFICANT CHANGE UP (ref 32–36)
MCV RBC AUTO: 85.2 FL — SIGNIFICANT CHANGE UP (ref 80–100)
MCV RBC AUTO: 85.2 FL — SIGNIFICANT CHANGE UP (ref 80–100)
MCV RBC AUTO: 86.2 FL — SIGNIFICANT CHANGE UP (ref 80–100)
MCV RBC AUTO: 86.2 FL — SIGNIFICANT CHANGE UP (ref 80–100)
MCV RBC AUTO: 86.6 FL — SIGNIFICANT CHANGE UP (ref 80–100)
MCV RBC AUTO: 86.6 FL — SIGNIFICANT CHANGE UP (ref 80–100)
MCV RBC AUTO: 86.8 FL — SIGNIFICANT CHANGE UP (ref 80–100)
MCV RBC AUTO: 86.8 FL — SIGNIFICANT CHANGE UP (ref 80–100)
MCV RBC AUTO: 87.2 FL — SIGNIFICANT CHANGE UP (ref 80–100)
MCV RBC AUTO: 87.2 FL — SIGNIFICANT CHANGE UP (ref 80–100)
MCV RBC AUTO: 88.2 FL — SIGNIFICANT CHANGE UP (ref 80–100)
MCV RBC AUTO: 88.2 FL — SIGNIFICANT CHANGE UP (ref 80–100)
MCV RBC AUTO: 90.4 FL — SIGNIFICANT CHANGE UP (ref 80–100)
MCV RBC AUTO: 90.4 FL — SIGNIFICANT CHANGE UP (ref 80–100)
MCV RBC AUTO: 94.6 FL — SIGNIFICANT CHANGE UP (ref 80–100)
MCV RBC AUTO: 94.6 FL — SIGNIFICANT CHANGE UP (ref 80–100)
MCV RBC AUTO: 97.5 FL — SIGNIFICANT CHANGE UP (ref 80–100)
MCV RBC AUTO: 97.5 FL — SIGNIFICANT CHANGE UP (ref 80–100)
METAMYELOCYTES # FLD: 2.6 % — HIGH (ref 0–1)
METAMYELOCYTES # FLD: 2.6 % — HIGH (ref 0–1)
MONOCYTES # BLD AUTO: 0.58 K/UL — SIGNIFICANT CHANGE UP (ref 0–0.9)
MONOCYTES # BLD AUTO: 0.63 K/UL — SIGNIFICANT CHANGE UP (ref 0–0.9)
MONOCYTES # BLD AUTO: 0.63 K/UL — SIGNIFICANT CHANGE UP (ref 0–0.9)
MONOCYTES # BLD AUTO: 0.74 K/UL — SIGNIFICANT CHANGE UP (ref 0–0.9)
MONOCYTES # BLD AUTO: 0.74 K/UL — SIGNIFICANT CHANGE UP (ref 0–0.9)
MONOCYTES # BLD AUTO: 0.75 K/UL — SIGNIFICANT CHANGE UP (ref 0–0.9)
MONOCYTES # BLD AUTO: 0.75 K/UL — SIGNIFICANT CHANGE UP (ref 0–0.9)
MONOCYTES # BLD AUTO: 0.92 K/UL — HIGH (ref 0–0.9)
MONOCYTES # BLD AUTO: 0.92 K/UL — HIGH (ref 0–0.9)
MONOCYTES # BLD AUTO: 1.16 K/UL — HIGH (ref 0–0.9)
MONOCYTES # BLD AUTO: 1.16 K/UL — HIGH (ref 0–0.9)
MONOCYTES # BLD AUTO: 1.58 K/UL — HIGH (ref 0–0.9)
MONOCYTES # BLD AUTO: 1.58 K/UL — HIGH (ref 0–0.9)
MONOCYTES NFR BLD AUTO: 10.2 % — SIGNIFICANT CHANGE UP (ref 2–14)
MONOCYTES NFR BLD AUTO: 10.2 % — SIGNIFICANT CHANGE UP (ref 2–14)
MONOCYTES NFR BLD AUTO: 10.4 % — SIGNIFICANT CHANGE UP (ref 2–14)
MONOCYTES NFR BLD AUTO: 10.4 % — SIGNIFICANT CHANGE UP (ref 2–14)
MONOCYTES NFR BLD AUTO: 14.8 % — HIGH (ref 2–14)
MONOCYTES NFR BLD AUTO: 14.8 % — HIGH (ref 2–14)
MONOCYTES NFR BLD AUTO: 6.5 % — SIGNIFICANT CHANGE UP (ref 2–14)
MONOCYTES NFR BLD AUTO: 6.5 % — SIGNIFICANT CHANGE UP (ref 2–14)
MONOCYTES NFR BLD AUTO: 7.4 % — SIGNIFICANT CHANGE UP (ref 2–14)
MONOCYTES NFR BLD AUTO: 7.4 % — SIGNIFICANT CHANGE UP (ref 2–14)
MONOCYTES NFR BLD AUTO: 7.9 % — SIGNIFICANT CHANGE UP (ref 2–14)
MONOCYTES NFR BLD AUTO: 7.9 % — SIGNIFICANT CHANGE UP (ref 2–14)
MONOCYTES NFR BLD AUTO: 8 % — SIGNIFICANT CHANGE UP (ref 2–14)
MONOCYTES NFR BLD AUTO: 8 % — SIGNIFICANT CHANGE UP (ref 2–14)
MONOCYTES NFR BLD AUTO: 8.1 % — SIGNIFICANT CHANGE UP (ref 2–14)
MONOCYTES NFR BLD AUTO: 8.1 % — SIGNIFICANT CHANGE UP (ref 2–14)
MRSA PCR RESULT.: SIGNIFICANT CHANGE UP
MRSA PCR RESULT.: SIGNIFICANT CHANGE UP
MYELOCYTES NFR BLD: 2.6 % — HIGH (ref 0–0)
MYELOCYTES NFR BLD: 2.6 % — HIGH (ref 0–0)
NEUTROPHILS # BLD AUTO: 10.7 K/UL — HIGH (ref 1.8–7.4)
NEUTROPHILS # BLD AUTO: 10.7 K/UL — HIGH (ref 1.8–7.4)
NEUTROPHILS # BLD AUTO: 11.71 K/UL — HIGH (ref 1.8–7.4)
NEUTROPHILS # BLD AUTO: 11.71 K/UL — HIGH (ref 1.8–7.4)
NEUTROPHILS # BLD AUTO: 3.85 K/UL — SIGNIFICANT CHANGE UP (ref 1.8–7.4)
NEUTROPHILS # BLD AUTO: 3.85 K/UL — SIGNIFICANT CHANGE UP (ref 1.8–7.4)
NEUTROPHILS # BLD AUTO: 4.94 K/UL — SIGNIFICANT CHANGE UP (ref 1.8–7.4)
NEUTROPHILS # BLD AUTO: 4.94 K/UL — SIGNIFICANT CHANGE UP (ref 1.8–7.4)
NEUTROPHILS # BLD AUTO: 5.23 K/UL — SIGNIFICANT CHANGE UP (ref 1.8–7.4)
NEUTROPHILS # BLD AUTO: 5.23 K/UL — SIGNIFICANT CHANGE UP (ref 1.8–7.4)
NEUTROPHILS # BLD AUTO: 6.41 K/UL — SIGNIFICANT CHANGE UP (ref 1.8–7.4)
NEUTROPHILS # BLD AUTO: 6.41 K/UL — SIGNIFICANT CHANGE UP (ref 1.8–7.4)
NEUTROPHILS # BLD AUTO: 6.48 K/UL — SIGNIFICANT CHANGE UP (ref 1.8–7.4)
NEUTROPHILS # BLD AUTO: 6.48 K/UL — SIGNIFICANT CHANGE UP (ref 1.8–7.4)
NEUTROPHILS # BLD AUTO: 7.01 K/UL — SIGNIFICANT CHANGE UP (ref 1.8–7.4)
NEUTROPHILS # BLD AUTO: 7.01 K/UL — SIGNIFICANT CHANGE UP (ref 1.8–7.4)
NEUTROPHILS NFR BLD AUTO: 57.4 % — SIGNIFICANT CHANGE UP (ref 43–77)
NEUTROPHILS NFR BLD AUTO: 57.4 % — SIGNIFICANT CHANGE UP (ref 43–77)
NEUTROPHILS NFR BLD AUTO: 62.5 % — SIGNIFICANT CHANGE UP (ref 43–77)
NEUTROPHILS NFR BLD AUTO: 62.5 % — SIGNIFICANT CHANGE UP (ref 43–77)
NEUTROPHILS NFR BLD AUTO: 68.3 % — SIGNIFICANT CHANGE UP (ref 43–77)
NEUTROPHILS NFR BLD AUTO: 68.3 % — SIGNIFICANT CHANGE UP (ref 43–77)
NEUTROPHILS NFR BLD AUTO: 72.7 % — SIGNIFICANT CHANGE UP (ref 43–77)
NEUTROPHILS NFR BLD AUTO: 72.7 % — SIGNIFICANT CHANGE UP (ref 43–77)
NEUTROPHILS NFR BLD AUTO: 73.1 % — SIGNIFICANT CHANGE UP (ref 43–77)
NEUTROPHILS NFR BLD AUTO: 73.1 % — SIGNIFICANT CHANGE UP (ref 43–77)
NEUTROPHILS NFR BLD AUTO: 75.2 % — SIGNIFICANT CHANGE UP (ref 43–77)
NEUTROPHILS NFR BLD AUTO: 75.2 % — SIGNIFICANT CHANGE UP (ref 43–77)
NEUTROPHILS NFR BLD AUTO: 82.3 % — HIGH (ref 43–77)
NEUTROPHILS NFR BLD AUTO: 82.3 % — HIGH (ref 43–77)
NEUTROPHILS NFR BLD AUTO: 86.7 % — HIGH (ref 43–77)
NEUTROPHILS NFR BLD AUTO: 86.7 % — HIGH (ref 43–77)
NEUTS BAND # BLD: 2.6 % — SIGNIFICANT CHANGE UP (ref 0–6)
NEUTS BAND # BLD: 2.6 % — SIGNIFICANT CHANGE UP (ref 0–6)
NITRITE UR-MCNC: NEGATIVE — SIGNIFICANT CHANGE UP
NON HDL CHOLESTEROL: 90 MG/DL — SIGNIFICANT CHANGE UP
NON HDL CHOLESTEROL: 90 MG/DL — SIGNIFICANT CHANGE UP
NRBC # BLD: 0 /100 WBCS — SIGNIFICANT CHANGE UP (ref 0–0)
NRBC # BLD: 3 /100 — HIGH (ref 0–0)
NRBC # BLD: 3 /100 — HIGH (ref 0–0)
NRBC # FLD: 0 K/UL — SIGNIFICANT CHANGE UP (ref 0–0)
NRBC # FLD: 0.02 K/UL — HIGH (ref 0–0)
NRBC # FLD: 0.02 K/UL — HIGH (ref 0–0)
NT-PROBNP SERPL-SCNC: 3904 PG/ML — HIGH
NT-PROBNP SERPL-SCNC: 3904 PG/ML — HIGH
NT-PROBNP SERPL-SCNC: HIGH PG/ML
NT-PROBNP SERPL-SCNC: HIGH PG/ML
OVALOCYTES BLD QL SMEAR: SLIGHT — SIGNIFICANT CHANGE UP
OVALOCYTES BLD QL SMEAR: SLIGHT — SIGNIFICANT CHANGE UP
PCO2 BLDA: 17 MMHG — LOW (ref 35–48)
PCO2 BLDA: 31 MMHG — LOW (ref 32–46)
PCO2 BLDA: 31 MMHG — LOW (ref 32–46)
PH BLDA: 7.16 — CRITICAL LOW (ref 7.35–7.45)
PH BLDA: 7.16 — CRITICAL LOW (ref 7.35–7.45)
PH BLDA: 7.44 — SIGNIFICANT CHANGE UP (ref 7.35–7.45)
PH UR: 5.5 — SIGNIFICANT CHANGE UP (ref 5–8)
PHOSPHATE SERPL-MCNC: 1.4 MG/DL — LOW (ref 2.5–4.5)
PHOSPHATE SERPL-MCNC: 1.4 MG/DL — LOW (ref 2.5–4.5)
PHOSPHATE SERPL-MCNC: 2 MG/DL — LOW (ref 2.5–4.5)
PHOSPHATE SERPL-MCNC: 2 MG/DL — LOW (ref 2.5–4.5)
PHOSPHATE SERPL-MCNC: 2.2 MG/DL — LOW (ref 2.5–4.5)
PHOSPHATE SERPL-MCNC: 2.2 MG/DL — LOW (ref 2.5–4.5)
PHOSPHATE SERPL-MCNC: 3.1 MG/DL — SIGNIFICANT CHANGE UP (ref 2.5–4.5)
PHOSPHATE SERPL-MCNC: 3.1 MG/DL — SIGNIFICANT CHANGE UP (ref 2.5–4.5)
PHOSPHATE SERPL-MCNC: 3.3 MG/DL — SIGNIFICANT CHANGE UP (ref 2.5–4.5)
PHOSPHATE SERPL-MCNC: 3.4 MG/DL — SIGNIFICANT CHANGE UP (ref 2.5–4.5)
PHOSPHATE SERPL-MCNC: 3.5 MG/DL — SIGNIFICANT CHANGE UP (ref 2.5–4.5)
PHOSPHATE SERPL-MCNC: 3.6 MG/DL — SIGNIFICANT CHANGE UP (ref 2.5–4.5)
PHOSPHATE SERPL-MCNC: 3.8 MG/DL — SIGNIFICANT CHANGE UP (ref 2.5–4.5)
PHOSPHATE SERPL-MCNC: 3.9 MG/DL — SIGNIFICANT CHANGE UP (ref 2.5–4.5)
PHOSPHATE SERPL-MCNC: 4.1 MG/DL — SIGNIFICANT CHANGE UP (ref 2.5–4.5)
PHOSPHATE SERPL-MCNC: 4.1 MG/DL — SIGNIFICANT CHANGE UP (ref 2.5–4.5)
PHOSPHATE SERPL-MCNC: 4.6 MG/DL — HIGH (ref 2.5–4.5)
PHOSPHATE SERPL-MCNC: 4.6 MG/DL — HIGH (ref 2.5–4.5)
PHOSPHATE SERPL-MCNC: 9.3 MG/DL — HIGH (ref 2.5–4.5)
PHOSPHATE SERPL-MCNC: 9.3 MG/DL — HIGH (ref 2.5–4.5)
PLAT MORPH BLD: NORMAL — SIGNIFICANT CHANGE UP
PLAT MORPH BLD: NORMAL — SIGNIFICANT CHANGE UP
PLATELET # BLD AUTO: 192 K/UL — SIGNIFICANT CHANGE UP (ref 150–400)
PLATELET # BLD AUTO: 192 K/UL — SIGNIFICANT CHANGE UP (ref 150–400)
PLATELET # BLD AUTO: 199 K/UL — SIGNIFICANT CHANGE UP (ref 150–400)
PLATELET # BLD AUTO: 199 K/UL — SIGNIFICANT CHANGE UP (ref 150–400)
PLATELET # BLD AUTO: 202 K/UL — SIGNIFICANT CHANGE UP (ref 150–400)
PLATELET # BLD AUTO: 202 K/UL — SIGNIFICANT CHANGE UP (ref 150–400)
PLATELET # BLD AUTO: 205 K/UL — SIGNIFICANT CHANGE UP (ref 150–400)
PLATELET # BLD AUTO: 205 K/UL — SIGNIFICANT CHANGE UP (ref 150–400)
PLATELET # BLD AUTO: 211 K/UL — SIGNIFICANT CHANGE UP (ref 150–400)
PLATELET # BLD AUTO: 211 K/UL — SIGNIFICANT CHANGE UP (ref 150–400)
PLATELET # BLD AUTO: 221 K/UL — SIGNIFICANT CHANGE UP (ref 150–400)
PLATELET # BLD AUTO: 221 K/UL — SIGNIFICANT CHANGE UP (ref 150–400)
PLATELET # BLD AUTO: 231 K/UL — SIGNIFICANT CHANGE UP (ref 150–400)
PLATELET # BLD AUTO: 231 K/UL — SIGNIFICANT CHANGE UP (ref 150–400)
PLATELET # BLD AUTO: 254 K/UL — SIGNIFICANT CHANGE UP (ref 150–400)
PLATELET COUNT - ESTIMATE: NORMAL — SIGNIFICANT CHANGE UP
PLATELET COUNT - ESTIMATE: NORMAL — SIGNIFICANT CHANGE UP
PO2 BLDA: 208 MMHG — HIGH (ref 83–108)
PO2 BLDA: 429 MMHG — HIGH (ref 83–108)
PO2 BLDA: 429 MMHG — HIGH (ref 83–108)
POIKILOCYTOSIS BLD QL AUTO: SLIGHT — SIGNIFICANT CHANGE UP
POIKILOCYTOSIS BLD QL AUTO: SLIGHT — SIGNIFICANT CHANGE UP
POTASSIUM SERPL-MCNC: 3.5 MMOL/L — SIGNIFICANT CHANGE UP (ref 3.5–5.3)
POTASSIUM SERPL-MCNC: 3.5 MMOL/L — SIGNIFICANT CHANGE UP (ref 3.5–5.3)
POTASSIUM SERPL-MCNC: 3.6 MMOL/L — SIGNIFICANT CHANGE UP (ref 3.5–5.3)
POTASSIUM SERPL-MCNC: 3.6 MMOL/L — SIGNIFICANT CHANGE UP (ref 3.5–5.3)
POTASSIUM SERPL-MCNC: 3.8 MMOL/L — SIGNIFICANT CHANGE UP (ref 3.5–5.3)
POTASSIUM SERPL-MCNC: 4 MMOL/L — SIGNIFICANT CHANGE UP (ref 3.5–5.3)
POTASSIUM SERPL-MCNC: 4.1 MMOL/L — SIGNIFICANT CHANGE UP (ref 3.5–5.3)
POTASSIUM SERPL-MCNC: 4.2 MMOL/L — SIGNIFICANT CHANGE UP (ref 3.5–5.3)
POTASSIUM SERPL-MCNC: 4.2 MMOL/L — SIGNIFICANT CHANGE UP (ref 3.5–5.3)
POTASSIUM SERPL-MCNC: 4.3 MMOL/L — SIGNIFICANT CHANGE UP (ref 3.5–5.3)
POTASSIUM SERPL-MCNC: 4.4 MMOL/L — SIGNIFICANT CHANGE UP (ref 3.5–5.3)
POTASSIUM SERPL-MCNC: 4.5 MMOL/L — SIGNIFICANT CHANGE UP (ref 3.5–5.3)
POTASSIUM SERPL-MCNC: 4.5 MMOL/L — SIGNIFICANT CHANGE UP (ref 3.5–5.3)
POTASSIUM SERPL-MCNC: 4.6 MMOL/L — SIGNIFICANT CHANGE UP (ref 3.5–5.3)
POTASSIUM SERPL-MCNC: 4.6 MMOL/L — SIGNIFICANT CHANGE UP (ref 3.5–5.3)
POTASSIUM SERPL-MCNC: 4.8 MMOL/L — SIGNIFICANT CHANGE UP (ref 3.5–5.3)
POTASSIUM SERPL-MCNC: 4.8 MMOL/L — SIGNIFICANT CHANGE UP (ref 3.5–5.3)
POTASSIUM SERPL-MCNC: 5.1 MMOL/L — SIGNIFICANT CHANGE UP (ref 3.5–5.3)
POTASSIUM SERPL-MCNC: 5.1 MMOL/L — SIGNIFICANT CHANGE UP (ref 3.5–5.3)
POTASSIUM SERPL-MCNC: 5.5 MMOL/L — HIGH (ref 3.5–5.3)
POTASSIUM SERPL-MCNC: 5.5 MMOL/L — HIGH (ref 3.5–5.3)
POTASSIUM SERPL-SCNC: 3.5 MMOL/L — SIGNIFICANT CHANGE UP (ref 3.5–5.3)
POTASSIUM SERPL-SCNC: 3.5 MMOL/L — SIGNIFICANT CHANGE UP (ref 3.5–5.3)
POTASSIUM SERPL-SCNC: 3.6 MMOL/L — SIGNIFICANT CHANGE UP (ref 3.5–5.3)
POTASSIUM SERPL-SCNC: 3.6 MMOL/L — SIGNIFICANT CHANGE UP (ref 3.5–5.3)
POTASSIUM SERPL-SCNC: 3.8 MMOL/L — SIGNIFICANT CHANGE UP (ref 3.5–5.3)
POTASSIUM SERPL-SCNC: 4 MMOL/L — SIGNIFICANT CHANGE UP (ref 3.5–5.3)
POTASSIUM SERPL-SCNC: 4.1 MMOL/L — SIGNIFICANT CHANGE UP (ref 3.5–5.3)
POTASSIUM SERPL-SCNC: 4.2 MMOL/L — SIGNIFICANT CHANGE UP (ref 3.5–5.3)
POTASSIUM SERPL-SCNC: 4.2 MMOL/L — SIGNIFICANT CHANGE UP (ref 3.5–5.3)
POTASSIUM SERPL-SCNC: 4.3 MMOL/L — SIGNIFICANT CHANGE UP (ref 3.5–5.3)
POTASSIUM SERPL-SCNC: 4.4 MMOL/L — SIGNIFICANT CHANGE UP (ref 3.5–5.3)
POTASSIUM SERPL-SCNC: 4.5 MMOL/L — SIGNIFICANT CHANGE UP (ref 3.5–5.3)
POTASSIUM SERPL-SCNC: 4.5 MMOL/L — SIGNIFICANT CHANGE UP (ref 3.5–5.3)
POTASSIUM SERPL-SCNC: 4.6 MMOL/L — SIGNIFICANT CHANGE UP (ref 3.5–5.3)
POTASSIUM SERPL-SCNC: 4.6 MMOL/L — SIGNIFICANT CHANGE UP (ref 3.5–5.3)
POTASSIUM SERPL-SCNC: 4.8 MMOL/L — SIGNIFICANT CHANGE UP (ref 3.5–5.3)
POTASSIUM SERPL-SCNC: 4.8 MMOL/L — SIGNIFICANT CHANGE UP (ref 3.5–5.3)
POTASSIUM SERPL-SCNC: 5.1 MMOL/L — SIGNIFICANT CHANGE UP (ref 3.5–5.3)
POTASSIUM SERPL-SCNC: 5.1 MMOL/L — SIGNIFICANT CHANGE UP (ref 3.5–5.3)
POTASSIUM SERPL-SCNC: 5.5 MMOL/L — HIGH (ref 3.5–5.3)
POTASSIUM SERPL-SCNC: 5.5 MMOL/L — HIGH (ref 3.5–5.3)
POTASSIUM UR-SCNC: 24.2 MMOL/L — SIGNIFICANT CHANGE UP
PROT SERPL-MCNC: 6 G/DL — SIGNIFICANT CHANGE UP (ref 6–8.3)
PROT SERPL-MCNC: 6 G/DL — SIGNIFICANT CHANGE UP (ref 6–8.3)
PROT SERPL-MCNC: 6.1 G/DL — SIGNIFICANT CHANGE UP (ref 6–8.3)
PROT SERPL-MCNC: 6.2 G/DL — SIGNIFICANT CHANGE UP (ref 6–8.3)
PROT SERPL-MCNC: 6.2 G/DL — SIGNIFICANT CHANGE UP (ref 6–8.3)
PROT SERPL-MCNC: 6.4 G/DL — SIGNIFICANT CHANGE UP (ref 6–8.3)
PROT SERPL-MCNC: 6.4 G/DL — SIGNIFICANT CHANGE UP (ref 6–8.3)
PROT SERPL-MCNC: 6.5 G/DL — SIGNIFICANT CHANGE UP (ref 6–8.3)
PROT SERPL-MCNC: 6.7 G/DL — SIGNIFICANT CHANGE UP (ref 6–8.3)
PROT SERPL-MCNC: 6.7 G/DL — SIGNIFICANT CHANGE UP (ref 6–8.3)
PROT SERPL-MCNC: 7.4 GM/DL — SIGNIFICANT CHANGE UP (ref 6–8.3)
PROT SERPL-MCNC: 7.4 GM/DL — SIGNIFICANT CHANGE UP (ref 6–8.3)
PROT UR-MCNC: 30 MG/DL
PROTHROM AB SERPL-ACNC: 10.7 SEC — SIGNIFICANT CHANGE UP (ref 9.5–13)
PROTHROM AB SERPL-ACNC: 10.7 SEC — SIGNIFICANT CHANGE UP (ref 9.5–13)
PROTHROM AB SERPL-ACNC: 11.4 SEC — SIGNIFICANT CHANGE UP (ref 9.5–13)
PROTHROM AB SERPL-ACNC: 11.4 SEC — SIGNIFICANT CHANGE UP (ref 9.5–13)
PROTHROM AB SERPL-ACNC: 11.7 SEC — SIGNIFICANT CHANGE UP (ref 9.5–13)
PROTHROM AB SERPL-ACNC: 11.7 SEC — SIGNIFICANT CHANGE UP (ref 9.5–13)
PROTHROM AB SERPL-ACNC: 11.8 SEC — SIGNIFICANT CHANGE UP (ref 9.5–13)
PROTHROM AB SERPL-ACNC: 11.8 SEC — SIGNIFICANT CHANGE UP (ref 9.5–13)
PROTHROM AB SERPL-ACNC: 12.3 SEC — SIGNIFICANT CHANGE UP (ref 9.5–13)
PROTHROM AB SERPL-ACNC: 12.3 SEC — SIGNIFICANT CHANGE UP (ref 9.5–13)
PROTHROM AB SERPL-ACNC: 12.7 SEC — SIGNIFICANT CHANGE UP (ref 9.5–13)
PROTHROM AB SERPL-ACNC: 12.7 SEC — SIGNIFICANT CHANGE UP (ref 9.5–13)
PROTHROM AB SERPL-ACNC: 12.8 SEC — SIGNIFICANT CHANGE UP (ref 9.5–13)
PROTHROM AB SERPL-ACNC: 12.8 SEC — SIGNIFICANT CHANGE UP (ref 9.5–13)
PROTHROM AB SERPL-ACNC: 13.3 SEC — HIGH (ref 9.5–13)
PROTHROM AB SERPL-ACNC: 13.3 SEC — HIGH (ref 9.5–13)
PROTHROM AB SERPL-ACNC: 14.4 SEC — HIGH (ref 9.5–13)
PROTHROM AB SERPL-ACNC: 14.4 SEC — HIGH (ref 9.5–13)
RAPID RVP RESULT: SIGNIFICANT CHANGE UP
RBC # BLD: 2.37 M/UL — LOW (ref 4.2–5.8)
RBC # BLD: 2.37 M/UL — LOW (ref 4.2–5.8)
RBC # BLD: 2.56 M/UL — LOW (ref 4.2–5.8)
RBC # BLD: 2.56 M/UL — LOW (ref 4.2–5.8)
RBC # BLD: 2.6 M/UL — LOW (ref 4.2–5.8)
RBC # BLD: 2.6 M/UL — LOW (ref 4.2–5.8)
RBC # BLD: 2.69 M/UL — LOW (ref 4.2–5.8)
RBC # BLD: 2.69 M/UL — LOW (ref 4.2–5.8)
RBC # BLD: 2.73 M/UL — LOW (ref 4.2–5.8)
RBC # BLD: 2.73 M/UL — LOW (ref 4.2–5.8)
RBC # BLD: 2.76 M/UL — LOW (ref 4.2–5.8)
RBC # BLD: 2.76 M/UL — LOW (ref 4.2–5.8)
RBC # BLD: 2.77 M/UL — LOW (ref 4.2–5.8)
RBC # BLD: 2.77 M/UL — LOW (ref 4.2–5.8)
RBC # BLD: 2.81 M/UL — LOW (ref 4.2–5.8)
RBC # BLD: 2.81 M/UL — LOW (ref 4.2–5.8)
RBC # BLD: 2.92 M/UL — LOW (ref 4.2–5.8)
RBC # BLD: 2.92 M/UL — LOW (ref 4.2–5.8)
RBC # FLD: 14.6 % — HIGH (ref 10.3–14.5)
RBC # FLD: 14.6 % — HIGH (ref 10.3–14.5)
RBC # FLD: 14.7 % — HIGH (ref 10.3–14.5)
RBC # FLD: 14.7 % — HIGH (ref 10.3–14.5)
RBC # FLD: 14.8 % — HIGH (ref 10.3–14.5)
RBC # FLD: 15 % — HIGH (ref 10.3–14.5)
RBC # FLD: 15.1 % — HIGH (ref 10.3–14.5)
RBC # FLD: 15.1 % — HIGH (ref 10.3–14.5)
RBC # FLD: 15.3 % — HIGH (ref 10.3–14.5)
RBC # FLD: 15.3 % — HIGH (ref 10.3–14.5)
RBC BLD AUTO: ABNORMAL
RBC BLD AUTO: ABNORMAL
RBC CASTS # UR COMP ASSIST: 19 /HPF — HIGH (ref 0–4)
REVIEW: SIGNIFICANT CHANGE UP
RH IG SCN BLD-IMP: NEGATIVE — SIGNIFICANT CHANGE UP
RH IG SCN BLD-IMP: NEGATIVE — SIGNIFICANT CHANGE UP
RSV RNA SPEC QL NAA+PROBE: SIGNIFICANT CHANGE UP
RV+EV RNA SPEC QL NAA+PROBE: SIGNIFICANT CHANGE UP
S AUREUS DNA NOSE QL NAA+PROBE: SIGNIFICANT CHANGE UP
S AUREUS DNA NOSE QL NAA+PROBE: SIGNIFICANT CHANGE UP
SAO2 % BLDA: 100 % — HIGH (ref 94–98)
SAO2 % BLDA: 100 % — HIGH (ref 94–98)
SAO2 % BLDA: 99.3 % — HIGH (ref 94–98)
SARS-COV-2 RNA SPEC QL NAA+PROBE: SIGNIFICANT CHANGE UP
SODIUM SERPL-SCNC: 135 MMOL/L — SIGNIFICANT CHANGE UP (ref 135–145)
SODIUM SERPL-SCNC: 135 MMOL/L — SIGNIFICANT CHANGE UP (ref 135–145)
SODIUM SERPL-SCNC: 137 MMOL/L — SIGNIFICANT CHANGE UP (ref 135–145)
SODIUM SERPL-SCNC: 138 MMOL/L — SIGNIFICANT CHANGE UP (ref 135–145)
SODIUM SERPL-SCNC: 139 MMOL/L — SIGNIFICANT CHANGE UP (ref 135–145)
SODIUM SERPL-SCNC: 140 MMOL/L — SIGNIFICANT CHANGE UP (ref 135–145)
SODIUM SERPL-SCNC: 141 MMOL/L — SIGNIFICANT CHANGE UP (ref 135–145)
SODIUM SERPL-SCNC: 143 MMOL/L — SIGNIFICANT CHANGE UP (ref 135–145)
SODIUM SERPL-SCNC: 143 MMOL/L — SIGNIFICANT CHANGE UP (ref 135–145)
SODIUM UR-SCNC: 57 MMOL/L — SIGNIFICANT CHANGE UP
SP GR SPEC: 1.02 — SIGNIFICANT CHANGE UP (ref 1–1.03)
SPECIMEN SOURCE: SIGNIFICANT CHANGE UP
SQUAMOUS # UR AUTO: 4 /HPF — SIGNIFICANT CHANGE UP (ref 0–5)
TRIGL SERPL-MCNC: 266 MG/DL — HIGH
TRIGL SERPL-MCNC: 266 MG/DL — HIGH
TROPONIN I, HIGH SENSITIVITY RESULT: 3265.8 NG/L — HIGH
TROPONIN I, HIGH SENSITIVITY RESULT: 3265.8 NG/L — HIGH
TROPONIN T, HIGH SENSITIVITY RESULT: 1116 NG/L — CRITICAL HIGH
TROPONIN T, HIGH SENSITIVITY RESULT: 1116 NG/L — CRITICAL HIGH
TROPONIN T, HIGH SENSITIVITY RESULT: 1323 NG/L — CRITICAL HIGH
TROPONIN T, HIGH SENSITIVITY RESULT: 1323 NG/L — CRITICAL HIGH
TROPONIN T, HIGH SENSITIVITY RESULT: 1549 NG/L — CRITICAL HIGH
TROPONIN T, HIGH SENSITIVITY RESULT: 1549 NG/L — CRITICAL HIGH
TROPONIN T, HIGH SENSITIVITY RESULT: 1763 NG/L — CRITICAL HIGH
TROPONIN T, HIGH SENSITIVITY RESULT: 1763 NG/L — CRITICAL HIGH
TROPONIN T, HIGH SENSITIVITY RESULT: 1873 NG/L — CRITICAL HIGH
TROPONIN T, HIGH SENSITIVITY RESULT: 1873 NG/L — CRITICAL HIGH
TROPONIN T, HIGH SENSITIVITY RESULT: 747 NG/L — CRITICAL HIGH
TROPONIN T, HIGH SENSITIVITY RESULT: 747 NG/L — CRITICAL HIGH
TROPONIN T, HIGH SENSITIVITY RESULT: 885 NG/L — CRITICAL HIGH
TROPONIN T, HIGH SENSITIVITY RESULT: 885 NG/L — CRITICAL HIGH
UROBILINOGEN FLD QL: 0.2 MG/DL — SIGNIFICANT CHANGE UP (ref 0.2–1)
WBC # BLD: 10.68 K/UL — HIGH (ref 3.8–10.5)
WBC # BLD: 10.68 K/UL — HIGH (ref 3.8–10.5)
WBC # BLD: 12.35 K/UL — HIGH (ref 3.8–10.5)
WBC # BLD: 12.35 K/UL — HIGH (ref 3.8–10.5)
WBC # BLD: 14.25 K/UL — HIGH (ref 3.8–10.5)
WBC # BLD: 14.25 K/UL — HIGH (ref 3.8–10.5)
WBC # BLD: 6.17 K/UL — SIGNIFICANT CHANGE UP (ref 3.8–10.5)
WBC # BLD: 6.17 K/UL — SIGNIFICANT CHANGE UP (ref 3.8–10.5)
WBC # BLD: 7.19 K/UL — SIGNIFICANT CHANGE UP (ref 3.8–10.5)
WBC # BLD: 7.19 K/UL — SIGNIFICANT CHANGE UP (ref 3.8–10.5)
WBC # BLD: 7.23 K/UL — SIGNIFICANT CHANGE UP (ref 3.8–10.5)
WBC # BLD: 7.23 K/UL — SIGNIFICANT CHANGE UP (ref 3.8–10.5)
WBC # BLD: 8.88 K/UL — SIGNIFICANT CHANGE UP (ref 3.8–10.5)
WBC # BLD: 8.88 K/UL — SIGNIFICANT CHANGE UP (ref 3.8–10.5)
WBC # BLD: 9.23 K/UL — SIGNIFICANT CHANGE UP (ref 3.8–10.5)
WBC # BLD: 9.23 K/UL — SIGNIFICANT CHANGE UP (ref 3.8–10.5)
WBC # BLD: 9.33 K/UL — SIGNIFICANT CHANGE UP (ref 3.8–10.5)
WBC # BLD: 9.33 K/UL — SIGNIFICANT CHANGE UP (ref 3.8–10.5)
WBC # FLD AUTO: 10.68 K/UL — HIGH (ref 3.8–10.5)
WBC # FLD AUTO: 10.68 K/UL — HIGH (ref 3.8–10.5)
WBC # FLD AUTO: 12.35 K/UL — HIGH (ref 3.8–10.5)
WBC # FLD AUTO: 12.35 K/UL — HIGH (ref 3.8–10.5)
WBC # FLD AUTO: 14.25 K/UL — HIGH (ref 3.8–10.5)
WBC # FLD AUTO: 14.25 K/UL — HIGH (ref 3.8–10.5)
WBC # FLD AUTO: 6.17 K/UL — SIGNIFICANT CHANGE UP (ref 3.8–10.5)
WBC # FLD AUTO: 6.17 K/UL — SIGNIFICANT CHANGE UP (ref 3.8–10.5)
WBC # FLD AUTO: 7.19 K/UL — SIGNIFICANT CHANGE UP (ref 3.8–10.5)
WBC # FLD AUTO: 7.19 K/UL — SIGNIFICANT CHANGE UP (ref 3.8–10.5)
WBC # FLD AUTO: 7.23 K/UL — SIGNIFICANT CHANGE UP (ref 3.8–10.5)
WBC # FLD AUTO: 7.23 K/UL — SIGNIFICANT CHANGE UP (ref 3.8–10.5)
WBC # FLD AUTO: 8.88 K/UL — SIGNIFICANT CHANGE UP (ref 3.8–10.5)
WBC # FLD AUTO: 8.88 K/UL — SIGNIFICANT CHANGE UP (ref 3.8–10.5)
WBC # FLD AUTO: 9.23 K/UL — SIGNIFICANT CHANGE UP (ref 3.8–10.5)
WBC # FLD AUTO: 9.23 K/UL — SIGNIFICANT CHANGE UP (ref 3.8–10.5)
WBC # FLD AUTO: 9.33 K/UL — SIGNIFICANT CHANGE UP (ref 3.8–10.5)
WBC # FLD AUTO: 9.33 K/UL — SIGNIFICANT CHANGE UP (ref 3.8–10.5)
WBC UR QL: 3 /HPF — SIGNIFICANT CHANGE UP (ref 0–5)

## 2023-01-01 PROCEDURE — 99291 CRITICAL CARE FIRST HOUR: CPT

## 2023-01-01 PROCEDURE — 99291 CRITICAL CARE FIRST HOUR: CPT | Mod: 25

## 2023-01-01 PROCEDURE — 99285 EMERGENCY DEPT VISIT HI MDM: CPT

## 2023-01-01 PROCEDURE — 99222 1ST HOSP IP/OBS MODERATE 55: CPT

## 2023-01-01 PROCEDURE — 93970 EXTREMITY STUDY: CPT | Mod: 26

## 2023-01-01 PROCEDURE — 99292 CRITICAL CARE ADDL 30 MIN: CPT

## 2023-01-01 PROCEDURE — 95720 EEG PHY/QHP EA INCR W/VEEG: CPT

## 2023-01-01 PROCEDURE — 71045 X-RAY EXAM CHEST 1 VIEW: CPT | Mod: 26

## 2023-01-01 PROCEDURE — 99223 1ST HOSP IP/OBS HIGH 75: CPT

## 2023-01-01 PROCEDURE — 70450 CT HEAD/BRAIN W/O DYE: CPT | Mod: 26,MA

## 2023-01-01 PROCEDURE — 76705 ECHO EXAM OF ABDOMEN: CPT | Mod: 26

## 2023-01-01 PROCEDURE — 93010 ELECTROCARDIOGRAM REPORT: CPT

## 2023-01-01 PROCEDURE — 93321 DOPPLER ECHO F-UP/LMTD STD: CPT | Mod: 26

## 2023-01-01 PROCEDURE — 99233 SBSQ HOSP IP/OBS HIGH 50: CPT

## 2023-01-01 PROCEDURE — 93306 TTE W/DOPPLER COMPLETE: CPT | Mod: 26

## 2023-01-01 PROCEDURE — 99053 MED SERV 10PM-8AM 24 HR FAC: CPT

## 2023-01-01 PROCEDURE — 93308 TTE F-UP OR LMTD: CPT | Mod: 26

## 2023-01-01 PROCEDURE — 76770 US EXAM ABDO BACK WALL COMP: CPT | Mod: 26

## 2023-01-01 PROCEDURE — 36620 INSERTION CATHETER ARTERY: CPT

## 2023-01-01 PROCEDURE — 71045 X-RAY EXAM CHEST 1 VIEW: CPT | Mod: 26,77

## 2023-01-01 PROCEDURE — 95718 EEG PHYS/QHP 2-12 HR W/VEEG: CPT

## 2023-01-01 RX ORDER — INSULIN LISPRO 100/ML
2 VIAL (ML) SUBCUTANEOUS
Refills: 0 | Status: DISCONTINUED | OUTPATIENT
Start: 2023-01-01 | End: 2023-01-01

## 2023-01-01 RX ORDER — PIPERACILLIN AND TAZOBACTAM 4; .5 G/20ML; G/20ML
3.38 INJECTION, POWDER, LYOPHILIZED, FOR SOLUTION INTRAVENOUS EVERY 12 HOURS
Refills: 0 | Status: DISCONTINUED | OUTPATIENT
Start: 2023-01-01 | End: 2023-01-01

## 2023-01-01 RX ORDER — AMIODARONE HYDROCHLORIDE 400 MG/1
0.5 TABLET ORAL
Qty: 450 | Refills: 0 | Status: DISCONTINUED | OUTPATIENT
Start: 2023-01-01 | End: 2023-01-01

## 2023-01-01 RX ORDER — ASPIRIN/CALCIUM CARB/MAGNESIUM 324 MG
300 TABLET ORAL ONCE
Refills: 0 | Status: COMPLETED | OUTPATIENT
Start: 2023-01-01 | End: 2023-01-01

## 2023-01-01 RX ORDER — DEXTROSE 50 % IN WATER 50 %
15 SYRINGE (ML) INTRAVENOUS ONCE
Refills: 0 | Status: DISCONTINUED | OUTPATIENT
Start: 2023-01-01 | End: 2023-01-01

## 2023-01-01 RX ORDER — SODIUM CHLORIDE 9 MG/ML
500 INJECTION, SOLUTION INTRAVENOUS ONCE
Refills: 0 | Status: DISCONTINUED | OUTPATIENT
Start: 2023-01-01 | End: 2023-01-01

## 2023-01-01 RX ORDER — HEPARIN SODIUM 5000 [USP'U]/ML
3800 INJECTION INTRAVENOUS; SUBCUTANEOUS EVERY 6 HOURS
Refills: 0 | Status: DISCONTINUED | OUTPATIENT
Start: 2023-01-01 | End: 2023-01-01

## 2023-01-01 RX ORDER — PIPERACILLIN AND TAZOBACTAM 4; .5 G/20ML; G/20ML
3.38 INJECTION, POWDER, LYOPHILIZED, FOR SOLUTION INTRAVENOUS ONCE
Refills: 0 | Status: COMPLETED | OUTPATIENT
Start: 2023-01-01 | End: 2023-01-01

## 2023-01-01 RX ORDER — DEXMEDETOMIDINE HYDROCHLORIDE IN 0.9% SODIUM CHLORIDE 4 UG/ML
0.2 INJECTION INTRAVENOUS
Qty: 400 | Refills: 0 | Status: DISCONTINUED | OUTPATIENT
Start: 2023-01-01 | End: 2023-01-01

## 2023-01-01 RX ORDER — SODIUM CHLORIDE 9 MG/ML
1000 INJECTION, SOLUTION INTRAVENOUS
Refills: 0 | Status: DISCONTINUED | OUTPATIENT
Start: 2023-01-01 | End: 2023-01-01

## 2023-01-01 RX ORDER — HEPARIN SODIUM 5000 [USP'U]/ML
5000 INJECTION INTRAVENOUS; SUBCUTANEOUS EVERY 8 HOURS
Refills: 0 | Status: DISCONTINUED | OUTPATIENT
Start: 2023-01-01 | End: 2023-01-01

## 2023-01-01 RX ORDER — AMIODARONE HYDROCHLORIDE 400 MG/1
150 TABLET ORAL ONCE
Refills: 0 | Status: COMPLETED | OUTPATIENT
Start: 2023-01-01 | End: 2023-01-01

## 2023-01-01 RX ORDER — CHLORHEXIDINE GLUCONATE 213 G/1000ML
1 SOLUTION TOPICAL
Refills: 0 | Status: DISCONTINUED | OUTPATIENT
Start: 2023-01-01 | End: 2023-01-01

## 2023-01-01 RX ORDER — NOREPINEPHRINE BITARTRATE/D5W 8 MG/250ML
0.05 PLASTIC BAG, INJECTION (ML) INTRAVENOUS
Qty: 8 | Refills: 0 | Status: DISCONTINUED | OUTPATIENT
Start: 2023-01-01 | End: 2023-01-01

## 2023-01-01 RX ORDER — INSULIN GLARGINE 100 [IU]/ML
8 INJECTION, SOLUTION SUBCUTANEOUS EVERY MORNING
Refills: 0 | Status: DISCONTINUED | OUTPATIENT
Start: 2023-01-01 | End: 2023-01-01

## 2023-01-01 RX ORDER — GLUCAGON INJECTION, SOLUTION 0.5 MG/.1ML
1 INJECTION, SOLUTION SUBCUTANEOUS ONCE
Refills: 0 | Status: DISCONTINUED | OUTPATIENT
Start: 2023-01-01 | End: 2023-01-01

## 2023-01-01 RX ORDER — MAGNESIUM SULFATE 500 MG/ML
2 VIAL (ML) INJECTION ONCE
Refills: 0 | Status: COMPLETED | OUTPATIENT
Start: 2023-01-01 | End: 2023-01-01

## 2023-01-01 RX ORDER — INSULIN GLARGINE 100 [IU]/ML
10 INJECTION, SOLUTION SUBCUTANEOUS ONCE
Refills: 0 | Status: COMPLETED | OUTPATIENT
Start: 2023-01-01 | End: 2023-01-01

## 2023-01-01 RX ORDER — MIDAZOLAM HYDROCHLORIDE 1 MG/ML
2 INJECTION, SOLUTION INTRAMUSCULAR; INTRAVENOUS ONCE
Refills: 0 | Status: DISCONTINUED | OUTPATIENT
Start: 2023-01-01 | End: 2023-01-01

## 2023-01-01 RX ORDER — ACETAMINOPHEN 500 MG
1000 TABLET ORAL ONCE
Refills: 0 | Status: COMPLETED | OUTPATIENT
Start: 2023-01-01 | End: 2023-01-01

## 2023-01-01 RX ORDER — HEPARIN SODIUM 5000 [USP'U]/ML
INJECTION INTRAVENOUS; SUBCUTANEOUS
Qty: 25000 | Refills: 0 | Status: DISCONTINUED | OUTPATIENT
Start: 2023-01-01 | End: 2023-01-01

## 2023-01-01 RX ORDER — PROPOFOL 10 MG/ML
20 INJECTION, EMULSION INTRAVENOUS
Qty: 1000 | Refills: 0 | Status: DISCONTINUED | OUTPATIENT
Start: 2023-01-01 | End: 2023-01-01

## 2023-01-01 RX ORDER — INSULIN LISPRO 100/ML
VIAL (ML) SUBCUTANEOUS EVERY 6 HOURS
Refills: 0 | Status: DISCONTINUED | OUTPATIENT
Start: 2023-01-01 | End: 2023-01-01

## 2023-01-01 RX ORDER — SODIUM BICARBONATE 1 MEQ/ML
50 SYRINGE (ML) INTRAVENOUS ONCE
Refills: 0 | Status: COMPLETED | OUTPATIENT
Start: 2023-01-01 | End: 2023-01-01

## 2023-01-01 RX ORDER — POTASSIUM CHLORIDE 20 MEQ
40 PACKET (EA) ORAL ONCE
Refills: 0 | Status: COMPLETED | OUTPATIENT
Start: 2023-01-01 | End: 2023-01-01

## 2023-01-01 RX ORDER — POTASSIUM PHOSPHATE, MONOBASIC POTASSIUM PHOSPHATE, DIBASIC 236; 224 MG/ML; MG/ML
30 INJECTION, SOLUTION INTRAVENOUS ONCE
Refills: 0 | Status: COMPLETED | OUTPATIENT
Start: 2023-01-01 | End: 2023-01-01

## 2023-01-01 RX ORDER — SODIUM CHLORIDE 9 MG/ML
3000 INJECTION INTRAMUSCULAR; INTRAVENOUS; SUBCUTANEOUS ONCE
Refills: 0 | Status: COMPLETED | OUTPATIENT
Start: 2023-01-01 | End: 2023-01-01

## 2023-01-01 RX ORDER — INSULIN LISPRO 100/ML
VIAL (ML) SUBCUTANEOUS
Refills: 0 | Status: DISCONTINUED | OUTPATIENT
Start: 2023-01-01 | End: 2023-01-01

## 2023-01-01 RX ORDER — HYDROCORTISONE 20 MG
50 TABLET ORAL EVERY 6 HOURS
Refills: 0 | Status: DISCONTINUED | OUTPATIENT
Start: 2023-01-01 | End: 2023-01-01

## 2023-01-01 RX ORDER — POTASSIUM CHLORIDE 20 MEQ
10 PACKET (EA) ORAL
Refills: 0 | Status: COMPLETED | OUTPATIENT
Start: 2023-01-01 | End: 2023-01-01

## 2023-01-01 RX ORDER — VASOPRESSIN 20 [USP'U]/ML
0.04 INJECTION INTRAVENOUS
Qty: 40 | Refills: 0 | Status: DISCONTINUED | OUTPATIENT
Start: 2023-01-01 | End: 2023-01-01

## 2023-01-01 RX ORDER — PROPOFOL 10 MG/ML
5 INJECTION, EMULSION INTRAVENOUS
Qty: 1000 | Refills: 0 | Status: DISCONTINUED | OUTPATIENT
Start: 2023-01-01 | End: 2023-01-01

## 2023-01-01 RX ORDER — AMLODIPINE BESYLATE 2.5 MG/1
5 TABLET ORAL DAILY
Refills: 0 | Status: DISCONTINUED | OUTPATIENT
Start: 2023-01-01 | End: 2023-01-01

## 2023-01-01 RX ORDER — PROPOFOL 10 MG/ML
0.1 INJECTION, EMULSION INTRAVENOUS
Qty: 500 | Refills: 0 | Status: DISCONTINUED | OUTPATIENT
Start: 2023-01-01 | End: 2023-01-01

## 2023-01-01 RX ORDER — AMIODARONE HYDROCHLORIDE 400 MG/1
1 TABLET ORAL
Qty: 450 | Refills: 0 | Status: DISCONTINUED | OUTPATIENT
Start: 2023-01-01 | End: 2023-01-01

## 2023-01-01 RX ORDER — ASPIRIN/CALCIUM CARB/MAGNESIUM 324 MG
325 TABLET ORAL ONCE
Refills: 0 | Status: DISCONTINUED | OUTPATIENT
Start: 2023-01-01 | End: 2023-01-01

## 2023-01-01 RX ORDER — INSULIN LISPRO 100/ML
VIAL (ML) SUBCUTANEOUS AT BEDTIME
Refills: 0 | Status: DISCONTINUED | OUTPATIENT
Start: 2023-01-01 | End: 2023-01-01

## 2023-01-01 RX ORDER — SODIUM CHLORIDE 9 MG/ML
1000 INJECTION, SOLUTION INTRAVENOUS ONCE
Refills: 0 | Status: COMPLETED | OUTPATIENT
Start: 2023-01-01 | End: 2023-01-01

## 2023-01-01 RX ORDER — CHLORHEXIDINE GLUCONATE 213 G/1000ML
15 SOLUTION TOPICAL EVERY 12 HOURS
Refills: 0 | Status: DISCONTINUED | OUTPATIENT
Start: 2023-01-01 | End: 2023-01-01

## 2023-01-01 RX ORDER — FUROSEMIDE 40 MG
40 TABLET ORAL ONCE
Refills: 0 | Status: DISCONTINUED | OUTPATIENT
Start: 2023-01-01 | End: 2023-01-01

## 2023-01-01 RX ORDER — DOBUTAMINE HCL 250MG/20ML
2.5 VIAL (ML) INTRAVENOUS
Qty: 1000 | Refills: 0 | Status: DISCONTINUED | OUTPATIENT
Start: 2023-01-01 | End: 2023-01-01

## 2023-01-01 RX ORDER — PROPOFOL 10 MG/ML
30 INJECTION, EMULSION INTRAVENOUS
Qty: 1000 | Refills: 0 | Status: DISCONTINUED | OUTPATIENT
Start: 2023-01-01 | End: 2023-01-01

## 2023-01-01 RX ORDER — INFLUENZA VIRUS VACCINE 15; 15; 15; 15 UG/.5ML; UG/.5ML; UG/.5ML; UG/.5ML
0.7 SUSPENSION INTRAMUSCULAR ONCE
Refills: 0 | Status: DISCONTINUED | OUTPATIENT
Start: 2023-01-01 | End: 2023-01-01

## 2023-01-01 RX ORDER — INSULIN GLARGINE 100 [IU]/ML
5 INJECTION, SOLUTION SUBCUTANEOUS ONCE
Refills: 0 | Status: COMPLETED | OUTPATIENT
Start: 2023-01-01 | End: 2023-01-01

## 2023-01-01 RX ORDER — LANOLIN ALCOHOL/MO/W.PET/CERES
3 CREAM (GRAM) TOPICAL AT BEDTIME
Refills: 0 | Status: DISCONTINUED | OUTPATIENT
Start: 2023-01-01 | End: 2023-01-01

## 2023-01-01 RX ORDER — PHENYLEPHRINE HYDROCHLORIDE 10 MG/ML
0.2 INJECTION INTRAVENOUS
Qty: 40 | Refills: 0 | Status: DISCONTINUED | OUTPATIENT
Start: 2023-01-01 | End: 2023-01-01

## 2023-01-01 RX ORDER — SODIUM CHLORIDE 9 MG/ML
500 INJECTION, SOLUTION INTRAVENOUS ONCE
Refills: 0 | Status: COMPLETED | OUTPATIENT
Start: 2023-01-01 | End: 2023-01-01

## 2023-01-01 RX ORDER — DEXTROSE 50 % IN WATER 50 %
50 SYRINGE (ML) INTRAVENOUS ONCE
Refills: 0 | Status: COMPLETED | OUTPATIENT
Start: 2023-01-01 | End: 2023-01-01

## 2023-01-01 RX ORDER — FENTANYL CITRATE 50 UG/ML
0.5 INJECTION INTRAVENOUS
Qty: 2500 | Refills: 0 | Status: DISCONTINUED | OUTPATIENT
Start: 2023-01-01 | End: 2023-01-01

## 2023-01-01 RX ORDER — VANCOMYCIN HCL 1 G
1000 VIAL (EA) INTRAVENOUS ONCE
Refills: 0 | Status: COMPLETED | OUTPATIENT
Start: 2023-01-01 | End: 2023-01-01

## 2023-01-01 RX ORDER — INSULIN GLARGINE 100 [IU]/ML
2 INJECTION, SOLUTION SUBCUTANEOUS ONCE
Refills: 0 | Status: COMPLETED | OUTPATIENT
Start: 2023-01-01 | End: 2023-01-01

## 2023-01-01 RX ORDER — INSULIN GLARGINE 100 [IU]/ML
5 INJECTION, SOLUTION SUBCUTANEOUS
Refills: 0 | Status: DISCONTINUED | OUTPATIENT
Start: 2023-01-01 | End: 2023-01-01

## 2023-01-01 RX ORDER — SODIUM,POTASSIUM PHOSPHATES 278-250MG
2 POWDER IN PACKET (EA) ORAL ONCE
Refills: 0 | Status: COMPLETED | OUTPATIENT
Start: 2023-01-01 | End: 2023-01-01

## 2023-01-01 RX ORDER — INSULIN HUMAN 100 [IU]/ML
5 INJECTION, SOLUTION SUBCUTANEOUS ONCE
Refills: 0 | Status: COMPLETED | OUTPATIENT
Start: 2023-01-01 | End: 2023-01-01

## 2023-01-01 RX ORDER — MEROPENEM 1 G/30ML
500 INJECTION INTRAVENOUS EVERY 12 HOURS
Refills: 0 | Status: DISCONTINUED | OUTPATIENT
Start: 2023-01-01 | End: 2023-01-01

## 2023-01-01 RX ORDER — OLANZAPINE 15 MG/1
5 TABLET, FILM COATED ORAL ONCE
Refills: 0 | Status: COMPLETED | OUTPATIENT
Start: 2023-01-01 | End: 2023-01-01

## 2023-01-01 RX ORDER — PANTOPRAZOLE SODIUM 20 MG/1
40 TABLET, DELAYED RELEASE ORAL DAILY
Refills: 0 | Status: DISCONTINUED | OUTPATIENT
Start: 2023-01-01 | End: 2023-01-01

## 2023-01-01 RX ORDER — PHENYLEPHRINE HYDROCHLORIDE 10 MG/ML
1 INJECTION INTRAVENOUS
Qty: 160 | Refills: 0 | Status: DISCONTINUED | OUTPATIENT
Start: 2023-01-01 | End: 2023-01-01

## 2023-01-01 RX ORDER — HEPARIN SODIUM 5000 [USP'U]/ML
3800 INJECTION INTRAVENOUS; SUBCUTANEOUS ONCE
Refills: 0 | Status: COMPLETED | OUTPATIENT
Start: 2023-01-01 | End: 2023-01-01

## 2023-01-01 RX ORDER — INSULIN HUMAN 100 [IU]/ML
1 INJECTION, SOLUTION SUBCUTANEOUS
Qty: 100 | Refills: 0 | Status: DISCONTINUED | OUTPATIENT
Start: 2023-01-01 | End: 2023-01-01

## 2023-01-01 RX ORDER — SODIUM BICARBONATE 1 MEQ/ML
0.24 SYRINGE (ML) INTRAVENOUS
Qty: 150 | Refills: 0 | Status: DISCONTINUED | OUTPATIENT
Start: 2023-01-01 | End: 2023-01-01

## 2023-01-01 RX ORDER — INSULIN GLARGINE 100 [IU]/ML
10 INJECTION, SOLUTION SUBCUTANEOUS
Refills: 0 | Status: DISCONTINUED | OUTPATIENT
Start: 2023-01-01 | End: 2023-01-01

## 2023-01-01 RX ORDER — INSULIN HUMAN 100 [IU]/ML
3 INJECTION, SOLUTION SUBCUTANEOUS
Qty: 100 | Refills: 0 | Status: DISCONTINUED | OUTPATIENT
Start: 2023-01-01 | End: 2023-01-01

## 2023-01-01 RX ORDER — PROPOFOL 10 MG/ML
50 INJECTION, EMULSION INTRAVENOUS
Qty: 500 | Refills: 0 | Status: DISCONTINUED | OUTPATIENT
Start: 2023-01-01 | End: 2023-01-01

## 2023-01-01 RX ORDER — DEXTROSE 50 % IN WATER 50 %
25 SYRINGE (ML) INTRAVENOUS ONCE
Refills: 0 | Status: DISCONTINUED | OUTPATIENT
Start: 2023-01-01 | End: 2023-01-01

## 2023-01-01 RX ORDER — DEXMEDETOMIDINE HYDROCHLORIDE IN 0.9% SODIUM CHLORIDE 4 UG/ML
0.7 INJECTION INTRAVENOUS
Qty: 400 | Refills: 0 | Status: DISCONTINUED | OUTPATIENT
Start: 2023-01-01 | End: 2023-01-01

## 2023-01-01 RX ORDER — MIDAZOLAM HYDROCHLORIDE 1 MG/ML
4 INJECTION, SOLUTION INTRAMUSCULAR; INTRAVENOUS ONCE
Refills: 0 | Status: DISCONTINUED | OUTPATIENT
Start: 2023-01-01 | End: 2023-01-01

## 2023-01-01 RX ADMIN — Medication 5.81 MICROGRAM(S)/KG/MIN: at 20:08

## 2023-01-01 RX ADMIN — CHLORHEXIDINE GLUCONATE 1 APPLICATION(S): 213 SOLUTION TOPICAL at 06:02

## 2023-01-01 RX ADMIN — CHLORHEXIDINE GLUCONATE 15 MILLILITER(S): 213 SOLUTION TOPICAL at 17:31

## 2023-01-01 RX ADMIN — CHLORHEXIDINE GLUCONATE 1 APPLICATION(S): 213 SOLUTION TOPICAL at 06:06

## 2023-01-01 RX ADMIN — INSULIN GLARGINE 2 UNIT(S): 100 INJECTION, SOLUTION SUBCUTANEOUS at 11:42

## 2023-01-01 RX ADMIN — SODIUM CHLORIDE 50 MILLILITER(S): 9 INJECTION, SOLUTION INTRAVENOUS at 16:08

## 2023-01-01 RX ADMIN — Medication 2 PACKET(S): at 19:51

## 2023-01-01 RX ADMIN — CHLORHEXIDINE GLUCONATE 15 MILLILITER(S): 213 SOLUTION TOPICAL at 18:23

## 2023-01-01 RX ADMIN — AMLODIPINE BESYLATE 5 MILLIGRAM(S): 2.5 TABLET ORAL at 11:27

## 2023-01-01 RX ADMIN — Medication 1 APPLICATION(S): at 18:23

## 2023-01-01 RX ADMIN — Medication 5.81 MICROGRAM(S)/KG/MIN: at 21:38

## 2023-01-01 RX ADMIN — PHENYLEPHRINE HYDROCHLORIDE 11.8 MICROGRAM(S)/KG/MIN: 10 INJECTION INTRAVENOUS at 03:06

## 2023-01-01 RX ADMIN — SODIUM CHLORIDE 1000 MILLILITER(S): 9 INJECTION INTRAMUSCULAR; INTRAVENOUS; SUBCUTANEOUS at 08:20

## 2023-01-01 RX ADMIN — Medication 40 MILLIEQUIVALENT(S): at 22:47

## 2023-01-01 RX ADMIN — HEPARIN SODIUM 5000 UNIT(S): 5000 INJECTION INTRAVENOUS; SUBCUTANEOUS at 15:32

## 2023-01-01 RX ADMIN — AMIODARONE HYDROCHLORIDE 33.3 MG/MIN: 400 TABLET ORAL at 04:48

## 2023-01-01 RX ADMIN — PROPOFOL 11.3 MICROGRAM(S)/KG/MIN: 10 INJECTION, EMULSION INTRAVENOUS at 19:49

## 2023-01-01 RX ADMIN — HEPARIN SODIUM 5000 UNIT(S): 5000 INJECTION INTRAVENOUS; SUBCUTANEOUS at 21:28

## 2023-01-01 RX ADMIN — Medication 400 MILLIGRAM(S): at 18:24

## 2023-01-01 RX ADMIN — PIPERACILLIN AND TAZOBACTAM 25 GRAM(S): 4; .5 INJECTION, POWDER, LYOPHILIZED, FOR SOLUTION INTRAVENOUS at 05:59

## 2023-01-01 RX ADMIN — DEXMEDETOMIDINE HYDROCHLORIDE IN 0.9% SODIUM CHLORIDE 11 MICROGRAM(S)/KG/HR: 4 INJECTION INTRAVENOUS at 20:08

## 2023-01-01 RX ADMIN — SODIUM CHLORIDE 50 MILLILITER(S): 9 INJECTION, SOLUTION INTRAVENOUS at 19:49

## 2023-01-01 RX ADMIN — POTASSIUM PHOSPHATE, MONOBASIC POTASSIUM PHOSPHATE, DIBASIC 83.33 MILLIMOLE(S): 236; 224 INJECTION, SOLUTION INTRAVENOUS at 23:19

## 2023-01-01 RX ADMIN — Medication 5.81 MICROGRAM(S)/KG/MIN: at 08:48

## 2023-01-01 RX ADMIN — VASOPRESSIN 6 UNIT(S)/MIN: 20 INJECTION INTRAVENOUS at 03:06

## 2023-01-01 RX ADMIN — CHLORHEXIDINE GLUCONATE 1 APPLICATION(S): 213 SOLUTION TOPICAL at 17:13

## 2023-01-01 RX ADMIN — SODIUM CHLORIDE 30 MILLILITER(S): 9 INJECTION, SOLUTION INTRAVENOUS at 20:08

## 2023-01-01 RX ADMIN — PROPOFOL 11.3 MICROGRAM(S)/KG/MIN: 10 INJECTION, EMULSION INTRAVENOUS at 16:10

## 2023-01-01 RX ADMIN — HEPARIN SODIUM 750 UNIT(S)/HR: 5000 INJECTION INTRAVENOUS; SUBCUTANEOUS at 22:52

## 2023-01-01 RX ADMIN — Medication 5.88 MICROGRAM(S)/KG/MIN: at 05:41

## 2023-01-01 RX ADMIN — HEPARIN SODIUM 5000 UNIT(S): 5000 INJECTION INTRAVENOUS; SUBCUTANEOUS at 21:36

## 2023-01-01 RX ADMIN — DEXMEDETOMIDINE HYDROCHLORIDE IN 0.9% SODIUM CHLORIDE 11 MICROGRAM(S)/KG/HR: 4 INJECTION INTRAVENOUS at 08:35

## 2023-01-01 RX ADMIN — CHLORHEXIDINE GLUCONATE 1 APPLICATION(S): 213 SOLUTION TOPICAL at 06:00

## 2023-01-01 RX ADMIN — AMIODARONE HYDROCHLORIDE 33.3 MG/MIN: 400 TABLET ORAL at 05:40

## 2023-01-01 RX ADMIN — SODIUM CHLORIDE 30 MILLILITER(S): 9 INJECTION, SOLUTION INTRAVENOUS at 08:35

## 2023-01-01 RX ADMIN — Medication 7.01 MICROGRAM(S)/KG/MIN: at 10:40

## 2023-01-01 RX ADMIN — Medication 1000 MILLIGRAM(S): at 15:47

## 2023-01-01 RX ADMIN — CHLORHEXIDINE GLUCONATE 15 MILLILITER(S): 213 SOLUTION TOPICAL at 05:43

## 2023-01-01 RX ADMIN — SODIUM CHLORIDE 75 MILLILITER(S): 9 INJECTION, SOLUTION INTRAVENOUS at 22:33

## 2023-01-01 RX ADMIN — HEPARIN SODIUM 5000 UNIT(S): 5000 INJECTION INTRAVENOUS; SUBCUTANEOUS at 05:59

## 2023-01-01 RX ADMIN — Medication 100 MILLIEQUIVALENT(S): at 04:27

## 2023-01-01 RX ADMIN — Medication 5.81 MICROGRAM(S)/KG/MIN: at 10:34

## 2023-01-01 RX ADMIN — PROPOFOL 11.3 MICROGRAM(S)/KG/MIN: 10 INJECTION, EMULSION INTRAVENOUS at 10:34

## 2023-01-01 RX ADMIN — SODIUM CHLORIDE 60 MILLILITER(S): 9 INJECTION, SOLUTION INTRAVENOUS at 22:49

## 2023-01-01 RX ADMIN — PROPOFOL 7.52 MICROGRAM(S)/KG/MIN: 10 INJECTION, EMULSION INTRAVENOUS at 05:41

## 2023-01-01 RX ADMIN — INSULIN HUMAN 1 UNIT(S)/HR: 100 INJECTION, SOLUTION SUBCUTANEOUS at 22:34

## 2023-01-01 RX ADMIN — DEXMEDETOMIDINE HYDROCHLORIDE IN 0.9% SODIUM CHLORIDE 11 MICROGRAM(S)/KG/HR: 4 INJECTION INTRAVENOUS at 19:37

## 2023-01-01 RX ADMIN — INSULIN GLARGINE 5 UNIT(S): 100 INJECTION, SOLUTION SUBCUTANEOUS at 07:59

## 2023-01-01 RX ADMIN — HEPARIN SODIUM 5000 UNIT(S): 5000 INJECTION INTRAVENOUS; SUBCUTANEOUS at 05:34

## 2023-01-01 RX ADMIN — Medication 1 APPLICATION(S): at 17:13

## 2023-01-01 RX ADMIN — DEXMEDETOMIDINE HYDROCHLORIDE IN 0.9% SODIUM CHLORIDE 11 MICROGRAM(S)/KG/HR: 4 INJECTION INTRAVENOUS at 07:50

## 2023-01-01 RX ADMIN — CHLORHEXIDINE GLUCONATE 15 MILLILITER(S): 213 SOLUTION TOPICAL at 05:59

## 2023-01-01 RX ADMIN — Medication 1 APPLICATION(S): at 05:42

## 2023-01-01 RX ADMIN — CHLORHEXIDINE GLUCONATE 15 MILLILITER(S): 213 SOLUTION TOPICAL at 17:12

## 2023-01-01 RX ADMIN — CHLORHEXIDINE GLUCONATE 1 APPLICATION(S): 213 SOLUTION TOPICAL at 17:30

## 2023-01-01 RX ADMIN — INSULIN HUMAN 2.5 UNIT(S)/HR: 100 INJECTION, SOLUTION SUBCUTANEOUS at 20:08

## 2023-01-01 RX ADMIN — MEROPENEM 100 MILLIGRAM(S): 1 INJECTION INTRAVENOUS at 05:57

## 2023-01-01 RX ADMIN — HEPARIN SODIUM 5000 UNIT(S): 5000 INJECTION INTRAVENOUS; SUBCUTANEOUS at 06:06

## 2023-01-01 RX ADMIN — HEPARIN SODIUM 5000 UNIT(S): 5000 INJECTION INTRAVENOUS; SUBCUTANEOUS at 13:14

## 2023-01-01 RX ADMIN — PIPERACILLIN AND TAZOBACTAM 25 GRAM(S): 4; .5 INJECTION, POWDER, LYOPHILIZED, FOR SOLUTION INTRAVENOUS at 18:06

## 2023-01-01 RX ADMIN — Medication 250 MILLIGRAM(S): at 05:57

## 2023-01-01 RX ADMIN — MIDAZOLAM HYDROCHLORIDE 4 MILLIGRAM(S): 1 INJECTION, SOLUTION INTRAMUSCULAR; INTRAVENOUS at 04:10

## 2023-01-01 RX ADMIN — Medication 1 APPLICATION(S): at 17:31

## 2023-01-01 RX ADMIN — INSULIN HUMAN 0.5 UNIT(S)/HR: 100 INJECTION, SOLUTION SUBCUTANEOUS at 10:35

## 2023-01-01 RX ADMIN — Medication 100 MILLIEQUIVALENT(S): at 05:31

## 2023-01-01 RX ADMIN — SODIUM CHLORIDE 3000 MILLILITER(S): 9 INJECTION INTRAMUSCULAR; INTRAVENOUS; SUBCUTANEOUS at 09:42

## 2023-01-01 RX ADMIN — Medication 25 GRAM(S): at 15:33

## 2023-01-01 RX ADMIN — Medication 50 MILLIEQUIVALENT(S): at 01:39

## 2023-01-01 RX ADMIN — Medication 50 MILLILITER(S): at 06:34

## 2023-01-01 RX ADMIN — PIPERACILLIN AND TAZOBACTAM 25 GRAM(S): 4; .5 INJECTION, POWDER, LYOPHILIZED, FOR SOLUTION INTRAVENOUS at 17:00

## 2023-01-01 RX ADMIN — HEPARIN SODIUM 3800 UNIT(S): 5000 INJECTION INTRAVENOUS; SUBCUTANEOUS at 22:53

## 2023-01-01 RX ADMIN — INSULIN HUMAN 5 UNIT(S): 100 INJECTION, SOLUTION SUBCUTANEOUS at 06:34

## 2023-01-01 RX ADMIN — PROPOFOL 11.3 MICROGRAM(S)/KG/MIN: 10 INJECTION, EMULSION INTRAVENOUS at 08:35

## 2023-01-01 RX ADMIN — INSULIN HUMAN 1 UNIT(S)/HR: 100 INJECTION, SOLUTION SUBCUTANEOUS at 19:49

## 2023-01-01 RX ADMIN — AMIODARONE HYDROCHLORIDE 618 MILLIGRAM(S): 400 TABLET ORAL at 03:35

## 2023-01-01 RX ADMIN — INSULIN GLARGINE 5 UNIT(S): 100 INJECTION, SOLUTION SUBCUTANEOUS at 10:33

## 2023-01-01 RX ADMIN — PIPERACILLIN AND TAZOBACTAM 25 GRAM(S): 4; .5 INJECTION, POWDER, LYOPHILIZED, FOR SOLUTION INTRAVENOUS at 05:34

## 2023-01-01 RX ADMIN — SODIUM CHLORIDE 60 MILLILITER(S): 9 INJECTION, SOLUTION INTRAVENOUS at 10:34

## 2023-01-01 RX ADMIN — HEPARIN SODIUM 5000 UNIT(S): 5000 INJECTION INTRAVENOUS; SUBCUTANEOUS at 15:13

## 2023-01-01 RX ADMIN — CHLORHEXIDINE GLUCONATE 1 APPLICATION(S): 213 SOLUTION TOPICAL at 05:36

## 2023-01-01 RX ADMIN — Medication 5.81 MICROGRAM(S)/KG/MIN: at 16:09

## 2023-01-01 RX ADMIN — PROPOFOL 18.6 MICROGRAM(S)/KG/MIN: 10 INJECTION, EMULSION INTRAVENOUS at 11:05

## 2023-01-01 RX ADMIN — HEPARIN SODIUM 750 UNIT(S)/HR: 5000 INJECTION INTRAVENOUS; SUBCUTANEOUS at 05:39

## 2023-01-01 RX ADMIN — Medication 1 APPLICATION(S): at 05:34

## 2023-01-01 RX ADMIN — PROPOFOL 11.3 MICROGRAM(S)/KG/MIN: 10 INJECTION, EMULSION INTRAVENOUS at 19:37

## 2023-01-01 RX ADMIN — PIPERACILLIN AND TAZOBACTAM 25 GRAM(S): 4; .5 INJECTION, POWDER, LYOPHILIZED, FOR SOLUTION INTRAVENOUS at 06:13

## 2023-01-01 RX ADMIN — SODIUM CHLORIDE 1000 MILLILITER(S): 9 INJECTION, SOLUTION INTRAVENOUS at 21:26

## 2023-01-01 RX ADMIN — Medication 2 UNIT(S): at 17:00

## 2023-01-01 RX ADMIN — Medication 400 MILLIGRAM(S): at 13:32

## 2023-01-01 RX ADMIN — Medication 5.81 MICROGRAM(S)/KG/MIN: at 11:05

## 2023-01-01 RX ADMIN — CHLORHEXIDINE GLUCONATE 1 APPLICATION(S): 213 SOLUTION TOPICAL at 17:17

## 2023-01-01 RX ADMIN — Medication 1 APPLICATION(S): at 06:00

## 2023-01-01 RX ADMIN — PIPERACILLIN AND TAZOBACTAM 25 GRAM(S): 4; .5 INJECTION, POWDER, LYOPHILIZED, FOR SOLUTION INTRAVENOUS at 05:33

## 2023-01-01 RX ADMIN — Medication 5.81 MICROGRAM(S)/KG/MIN: at 19:49

## 2023-01-01 RX ADMIN — DEXMEDETOMIDINE HYDROCHLORIDE IN 0.9% SODIUM CHLORIDE 11 MICROGRAM(S)/KG/HR: 4 INJECTION INTRAVENOUS at 10:34

## 2023-01-01 RX ADMIN — INSULIN HUMAN 1 UNIT(S)/HR: 100 INJECTION, SOLUTION SUBCUTANEOUS at 08:43

## 2023-01-01 RX ADMIN — PIPERACILLIN AND TAZOBACTAM 25 GRAM(S): 4; .5 INJECTION, POWDER, LYOPHILIZED, FOR SOLUTION INTRAVENOUS at 18:23

## 2023-01-01 RX ADMIN — CHLORHEXIDINE GLUCONATE 15 MILLILITER(S): 213 SOLUTION TOPICAL at 06:06

## 2023-01-01 RX ADMIN — HEPARIN SODIUM 5000 UNIT(S): 5000 INJECTION INTRAVENOUS; SUBCUTANEOUS at 21:04

## 2023-01-01 RX ADMIN — Medication 1 APPLICATION(S): at 06:05

## 2023-01-01 RX ADMIN — SODIUM CHLORIDE 1000 MILLILITER(S): 9 INJECTION, SOLUTION INTRAVENOUS at 22:54

## 2023-01-01 RX ADMIN — Medication 0: at 07:09

## 2023-01-01 RX ADMIN — Medication 50 MILLIGRAM(S): at 02:10

## 2023-01-01 RX ADMIN — Medication 100 MILLIEQUIVALENT(S): at 03:26

## 2023-01-01 RX ADMIN — MIDAZOLAM HYDROCHLORIDE 2 MILLIGRAM(S): 1 INJECTION, SOLUTION INTRAMUSCULAR; INTRAVENOUS at 09:13

## 2023-01-01 RX ADMIN — INSULIN HUMAN 1 UNIT(S)/HR: 100 INJECTION, SOLUTION SUBCUTANEOUS at 16:08

## 2023-01-01 RX ADMIN — Medication 50 MILLIEQUIVALENT(S): at 05:47

## 2023-01-01 RX ADMIN — SODIUM CHLORIDE 30 MILLILITER(S): 9 INJECTION, SOLUTION INTRAVENOUS at 19:37

## 2023-01-01 RX ADMIN — Medication 1000 MILLIGRAM(S): at 18:39

## 2023-01-01 RX ADMIN — PIPERACILLIN AND TAZOBACTAM 200 GRAM(S): 4; .5 INJECTION, POWDER, LYOPHILIZED, FOR SOLUTION INTRAVENOUS at 16:09

## 2023-01-01 RX ADMIN — CHLORHEXIDINE GLUCONATE 1 APPLICATION(S): 213 SOLUTION TOPICAL at 05:35

## 2023-01-01 RX ADMIN — SODIUM CHLORIDE 1000 MILLILITER(S): 9 INJECTION, SOLUTION INTRAVENOUS at 05:58

## 2023-01-01 RX ADMIN — Medication 25 GRAM(S): at 19:50

## 2023-01-01 RX ADMIN — MIDAZOLAM HYDROCHLORIDE 2 MILLIGRAM(S): 1 INJECTION, SOLUTION INTRAMUSCULAR; INTRAVENOUS at 03:56

## 2023-01-01 RX ADMIN — Medication 2 UNIT(S): at 11:41

## 2023-01-01 RX ADMIN — Medication 300 MILLIGRAM(S): at 01:17

## 2023-01-01 RX ADMIN — FENTANYL CITRATE 3.1 MICROGRAM(S)/KG/HR: 50 INJECTION INTRAVENOUS at 11:55

## 2023-01-01 RX ADMIN — CHLORHEXIDINE GLUCONATE 1 APPLICATION(S): 213 SOLUTION TOPICAL at 18:23

## 2023-01-01 RX ADMIN — PROPOFOL 2.24 MICROGRAM(S)/KG/MIN: 10 INJECTION, EMULSION INTRAVENOUS at 09:09

## 2023-01-01 RX ADMIN — INSULIN GLARGINE 10 UNIT(S): 100 INJECTION, SOLUTION SUBCUTANEOUS at 05:13

## 2023-01-01 RX ADMIN — HEPARIN SODIUM 5000 UNIT(S): 5000 INJECTION INTRAVENOUS; SUBCUTANEOUS at 21:48

## 2023-01-01 RX ADMIN — PIPERACILLIN AND TAZOBACTAM 25 GRAM(S): 4; .5 INJECTION, POWDER, LYOPHILIZED, FOR SOLUTION INTRAVENOUS at 17:12

## 2023-01-01 RX ADMIN — INSULIN HUMAN 0.5 UNIT(S)/HR: 100 INJECTION, SOLUTION SUBCUTANEOUS at 17:10

## 2023-01-01 RX ADMIN — Medication 5.81 MICROGRAM(S)/KG/MIN: at 19:37

## 2023-01-01 RX ADMIN — INSULIN HUMAN 0.5 UNIT(S)/HR: 100 INJECTION, SOLUTION SUBCUTANEOUS at 19:36

## 2023-01-01 RX ADMIN — Medication 2.35 MICROGRAM(S)/KG/MIN: at 04:48

## 2023-01-01 RX ADMIN — HEPARIN SODIUM 5000 UNIT(S): 5000 INJECTION INTRAVENOUS; SUBCUTANEOUS at 05:32

## 2023-01-01 RX ADMIN — PIPERACILLIN AND TAZOBACTAM 25 GRAM(S): 4; .5 INJECTION, POWDER, LYOPHILIZED, FOR SOLUTION INTRAVENOUS at 17:15

## 2023-01-01 RX ADMIN — Medication 40 MILLIEQUIVALENT(S): at 21:48

## 2023-01-01 RX ADMIN — HEPARIN SODIUM 5000 UNIT(S): 5000 INJECTION INTRAVENOUS; SUBCUTANEOUS at 13:47

## 2023-01-01 RX ADMIN — CHLORHEXIDINE GLUCONATE 1 APPLICATION(S): 213 SOLUTION TOPICAL at 05:47

## 2023-01-01 RX ADMIN — CHLORHEXIDINE GLUCONATE 1 APPLICATION(S): 213 SOLUTION TOPICAL at 16:50

## 2023-01-01 RX ADMIN — MIDAZOLAM HYDROCHLORIDE 2 MILLIGRAM(S): 1 INJECTION, SOLUTION INTRAMUSCULAR; INTRAVENOUS at 03:33

## 2023-01-01 RX ADMIN — CHLORHEXIDINE GLUCONATE 15 MILLILITER(S): 213 SOLUTION TOPICAL at 05:34

## 2023-01-01 RX ADMIN — Medication 5.81 MICROGRAM(S)/KG/MIN: at 08:43

## 2023-12-13 NOTE — CHART NOTE - NSCHARTNOTEFT_GEN_A_CORE
EEG preliminary read (not final) on the initial recording hour(s) = x 3    No seizures recorded.  Moderate slowing noted, nonspecific.  Final report to follow tomorrow morning after completion of study.    [facility] EEG Reading Room Ph#: [phone]  Epilepsy Answering Service after 5PM and before 8:30AM: Ph#: [phone] EEG preliminary read (not final) on the initial recording hour(s) = x 3    No seizures recorded.  Moderate slowing noted, nonspecific.  Final report to follow tomorrow morning after completion of study.    [facility] EEG Reading Room Ph#: [phone]  Epilepsy Answering Service after 5PM and before 8:30AM: Ph#: [phone]

## 2023-12-13 NOTE — ED PROVIDER NOTE - PHYSICAL EXAMINATION
Luna Lundberg MD  GENERAL: Patient intubated and sedated   HEENT: NC/AT, Moist mucous membranes, PERRL  LUNGS: CTAB, no wheezes or crackles. intubated.  CARDIAC: RRR, no m/r/g.    ABDOMEN: Soft, NT, ND  EXT: No edema.  MSK: no deformities.  NEURO: sedated.   SKIN: Warm and dry. No rash.

## 2023-12-13 NOTE — PATIENT PROFILE ADULT - FUNCTIONAL ASSESSMENT - BASIC MOBILITY 6.
3-calculated by average/Not able to assess (calculate score using [facility] averaging method)  3-calculated by average/Not able to assess (calculate score using [facility] averaging method)

## 2023-12-13 NOTE — H&P ADULT - NSHPPHYSICALEXAM_GEN_ALL_CORE
PHYSICAL EXAM:  GEN: Intubated, sedated  HEENT: NCAT, EOMI  CV: RRR, Ns1/s2, no m/r/g, JVP not elevated  RESP: mechanical breath sounds b/l, no clear rales  ABD: soft, nt, nd  EXT: warm, 2+ pulses, no edema  SKIN: no visible lesions, rashes  NEURO: sedated  PSYCH: sedated

## 2023-12-13 NOTE — ED PROVIDER NOTE - PROGRESS NOTE DETAILS
pt experienced an unresponsive episode, staring off to the right, lasting 2-3 minutes, then returned to his baseline, pt able to recognize his daughter and knows where he is, remains in afib D pt experienced a second episode of unresponsiveness, became cyanotic, bradycardic and lost pulses, code blue called, cpr started following acls protocol, rosc achieved after one round of epi, pt now in sinus tach, sbp >100 transfer center called, case discussed with Dr holman (cardiac fellow), ekgs faxed over, will discuss the case with the interventionalist Dr Wells from the transfer center called, pt accepted ER to ER, report given to Dr Barroso, recommends no anticoagulation at this time even if ct is negative,

## 2023-12-13 NOTE — H&P ADULT - HISTORY OF PRESENT ILLNESS
Mr. Campbell is a 87yo man w/ HTN and DM who was transferred to [facility] after cardiac arrest for further care, MICU is being consulted due to patient being intubated and requiring higher level of care.     [place] from [place] , [place] rec[place] medical care in the [place]. He is visiting his daughter. Per his daughter, this morning he had a event of LOC that lasted a few minutes where his daughter found him on the ground, unresponsive, w/ gaze deviation and rigid. She is unsure if there was any seizure, but denies any hx of seizures or neurological issues. He regained consciousness spontaneously and was able to recognize his daughter. He was brought to [facility] where he was found to be afebrile w/ normal BP, and tachycardic w/ AFib wih rates in the 140-150s.     While at [facility] pt denied CP, SOB, abd pain, c/d/n/v, LE swelling. While in the ED at [facility] he had an acute episode of reported gaze deviation and unresponsiveness. During the episode he became hypoxic, He then lost a pulse and ACLS was started w/ ROSC after 8min. He was intubated and started on prop and levophed. CTH showed no bleed.     Labs there were notable for a leukocytosis, anemia, Cr 3.3, acidosis, HS-trop I 3300, w/ a ABG showed 7.16 / 31 / 429. CXR was relatively clear w/ no clear consolidation or signs of fluid overload. He was then transferred to [facility] for further evaluation.      Mr. Campbell is a 89yo man w/ HTN and DM who was transferred to [facility] after cardiac arrest for further care, MICU is being consulted due to patient being intubated and requiring higher level of care.     Pt is from [place] , [place] not recieve medical care in the [place]. He is visiting his daughter. Per his daughter, this morning he had a event of LOC that lasted a few minutes where his daughter found him on the ground, unresponsive, w/ gaze deviation and rigid. She is unsure if there was any seizure, but denies any hx of seizures or neurological issues. He regained consciousness spontaneously and was able to recognize his daughter. He was brought to [facility] where he was found to be afebrile w/ normal BP, and tachycardic w/ AFib wih rates in the 140-150s.     While at [facility] pt denied CP, SOB, abd pain, c/d/n/v, LE swelling. While in the ED at [facility] he had an acute episode of reported gaze deviation and unresponsiveness. During the episode he became hypoxic, He then lost a pulse and ACLS was started w/ ROSC after 8min. He was intubated and started on prop and levophed. CTH showed no bleed.     Labs there were notable for a leukocytosis, anemia, Cr 3.3, acidosis, HS-trop I 3300, w/ a ABG showed 7.16 / 31 / 429. CXR was relatively clear w/ no clear consolidation or signs of fluid overload. He was then transferred to [facility] for further evaluation.      Mr. Campbell is a 89yo man w/ HTN and DM who was transferred from [facility] after cardiac arrest for further care,     Pt is from the , does not recieve medical care in the [place]. He is visiting his daughter. Per his daughter, this morning he had a event of LOC that lasted a few minutes where his daughter found him on the ground, unresponsive, w/ gaze deviation and rigid. She is unsure if there was any seizure, but denies any hx of seizures or neurological issues. He regained consciousness spontaneously and was able to recognize his daughter. He was brought to [facility] where he was found to be afebrile w/ normal BP, and tachycardic w/ AFib wih rates in the 140-150s. While at [place] pt denied CP, SOB, abd pain, c/d/n/v, LE swelling. While in the ED at [place] he had an acute episode of reported gaze deviation and unresponsiveness. During the episode he became hypoxic, He then lost a pulse and ACLS was started w/ ROSC after 8min with one round of epi. He was intubated and started on prop and levophed. CTH showed no bleed.     Labs there were notable for a leukocytosis, anemia, Cr 3.3, acidosis, HS-trop I 3300, w/ a ABG showed 7.16 / 31 / 429. CXR was relatively clear w/ no clear consolidation or signs of fluid overload. He was then transferred to [facility] for further cardiac eval. Cardiology here at [facility] had low suspicion for Cardiac etiology for Cardiac arrest , recommended MICU consult. EKG notable for PVCs , no ST elevation / no ST depression.       Mr. Campbell is a 87yo man w/ HTN and DM who was transferred from [facility] after cardiac arrest for further care,     Pt is from the , [place] not recieve medical care in the [place]. He is visiting his daughter. Per his daughter, this morning he had a event of LOC that lasted a few minutes where his daughter found him on the ground, unresponsive, w/ gaze deviation and rigid. She is unsure if there was any seizure, but denies any hx of seizures or neurological issues. He regained consciousness spontaneously and was able to recognize his daughter. He was brought to [facility] where he was found to be afebrile w/ normal BP, and tachycardic w/ AFib wih rates in the 140-150s. While at [facility] pt denied CP, SOB, abd pain, c/d/n/v, LE swelling. While in the ED at [facility] he had an acute episode of reported gaze deviation and unresponsiveness. During the episode he became hypoxic, He then lost a pulse and ACLS was started w/ ROSC after 8min with one round of epi. He was intubated and started on prop and levophed. CTH showed no bleed.     Labs there were notable for a leukocytosis, anemia, Cr 3.3, acidosis, HS-trop I 3300, w/ a ABG showed 7.16 / 31 / 429. CXR was relatively clear w/ no clear consolidation or signs of fluid overload. He was then transferred to [facility] for further cardiac eval. Cardiology here at [facility] had low suspicion for Cardiac etiology for Cardiac arrest , recommended MICU consult. EKG notable for PVCs , no ST elevation / no ST depression.

## 2023-12-13 NOTE — CONSULT NOTE ADULT - ATTENDING COMMENTS
Pt with DM on SGLT2i who presented with several minutes of LOC, Afib with RVR with deep ST depressions, and bradyarrhythmia of unclear etiology. Has a metabolic acidosis without a significant lactate and significantly elevated SCr which raises the concern for euglycemic diabetic ketoacidosis.  Review of available tele without clear VT; as such, suspect PEA with acidosis as a contributor. Repeat ECG without acute ST changes, thus suspect the patient more likely has obstructive CAD without ACS.    Check TTE  Ischemic evaluation pending clinical course

## 2023-12-13 NOTE — ED ADULT NURSE NOTE - OBJECTIVE STATEMENT
Patient is a 88-year-old male, past medical history of hypertension, diabetes (unknown type), HLD, appendicitis arrives as a transfer from [facility] s/p cardiac arrest (per EMS obtained ROSC after 4 minutes), here for cardiology. Per patient daughters, patient has been having these episodes of "unresponsiveness" over the last couple of days. Patient arrives intubated with a 7.0 ET tube and 24 at the lip. Patient arrives on Levophed @ 4mcg/kg/min and propofol @50mcg/kg. (See block charting in vital signs flow sheet) Pt also placed on fentanyl drip upon arrival. Pt on ventilator and respirations are even and unlabored, chest rise equal b/l. Placed on cardiac monitor. Patient with troponin levels elevated from other facility. Patient turned and positioned, skin noted to be intact. Rectally afebrile. Arrives with 16 [facility] [address]illo catheter in place. X 3 IV accesses. Sites clean, dry, and intact. Float RN Lorena and mobile critical care nurse at bedside and aware. Primary RN hand off given to HUSSAIN Gibbs. Bed set in lowest position. Safety maintained through out. Patient is a 88-year-old male, past medical history of hypertension, diabetes (unknown type), HLD, appendicitis arrives as a transfer from [facility] s/p cardiac arrest (per EMS obtained ROSC after 4 minutes), here for cardiology. Per patient daughters, patient has been having these episodes of "unresponsiveness" over the last couple of days. Patient arrives intubated with a 7.0 ET tube and 24 at the lip. Patient arrives on Levophed @ 4mcg/kg/min and propofol @50mcg/kg. (See block charting in vital signs flow sheet) Pt also placed on fentanyl drip upon arrival. Pt on ventilator and respirations are even and unlabored, chest rise equal b/l. Placed on cardiac monitor. Patient with troponin levels elevated from other facility. Patient turned and positioned, skin noted to be intact. Rectally afebrile. Arrives with 16 [place] Cedillo catheter in place. X 3 IV accesses. Sites clean, dry, and intact. Float RN Lorena and mobile critical care nurse at bedside and aware. Primary RN hand off given to HUSSAIN Gibbs. Bed set in lowest position. Safety maintained through out.

## 2023-12-13 NOTE — CONSULT NOTE ADULT - SUBJECTIVE AND OBJECTIVE BOX
Cardiology Consult Note   [Please check [email or web address] password: [email or web address]" for cardiology service schedule and contact information]    History of Present Illness:   Mr. Campbell is a 89yo man w/ HTN and DM who was transferred to [facility] after cardiac arrest for further care, cardiology is being consulted as there was report of possible VT arrest.    [place] from [place] , [place] not recieve medical care in the [place]. He is visiting his daughter. Per his daughter, this morning he had a event of LOC that lasted a few minutes where his daughter found him on the ground, unresponsive, w/ gaze deviation and rigid. She is unsure if there was any seizure, but denies any hx of seizures or neurological issues. He regained consciousness spontaneously and was confused / lethargic for a few minutes but was able to recognize his daughter. He was brought to the hospital where he was found to be afebrile w/ normal BP, and tachycardic w/ AFib w/ RVR w/ rates in the 140-150s w/ STD in the anterolateral leads w/o any ANNETTE. While at  he denied CP, SOB, abd pain, c/d/n/v, LE swelling. While in the ED at  he had an acute episode of reported gaze deviation and unresponsiveness. During the episode he became hypoxic, and per tele strips sent w/ the pt became bradycardic w/ intermittent PVCs, but did not have any evidence of VT or VFib per the strips. He then lost a pulse and ACLS was started w/ ROSC after 8min. He was intubated and started on prop and levophed iso mild h. CTH showed no bleed. Labs there were notable for a leukocytosis, anemia, Cr 3.3, acidosis, HS-trop I 3300, w/ a ABG showed 7.16 / 31 / 429. CXR was relatively clear w/ no clear consolidation or signs of fluid overload. He was then transferred to [facility] for further evaluation.     On arrival at [facility] pt was intubated and sedated. He was HDS on low dose prop and EKG here was NSR w/ non signs of ischemic changes. Labs here showed similar CBC, continued acidosis, Cr 2.84,  / , HS-Trop T 1100, , VBG 7.15 / 12.8.    EKG:  Sinus tach at 107BPM w/ normal axis, normal intervals, no ST-TW changes, infrequent PAC, non-ischemic    HOME CARDIAC MEDS:  Candesartan-HCTZ 32-25mg  Dapagliflozin 10mg  Fenofibrate 160mg    PMHx: reviewed, see below  SOCIAL HISTORY:  unchanged    MEDICATIONS:  norepinephrine Infusion 0.05 MICROgram(s)/kG/Min IV Continuous <Continuous>  fentaNYL   Infusion. 0.5 MICROgram(s)/kG/Hr IV Continuous <Continuous>  propofol Infusion 50 MICROgram(s)/kG/Min IV Continuous <Continuous>  chlorhexidine 0.12% Liquid 15 milliLiter(s) Oral Mucosa every 12 hours  influenza  Vaccine (HIGH DOSE) 0.7 milliLiter(s) IntraMuscular once      REVIEW OF SYSTEMS:  Unable to perform, as pt is intuabted    All other review of systems is negative unless indicated above.   -------------------------------------------------------------------------------------------  VITALS:  T(C): 36.3 (12-13-23 @ 11:13), Max: 37 (12-13-23 @ 08:00)  HR: 94 (12-13-23 @ 12:45) (82 - 157)  BP: 141/73 (12-13-23 @ 12:45) (62/49 - 187/73)  RR: 18 (12-13-23 @ 12:45) (12 - 26)  SpO2: 100% (12-13-23 @ 12:45) (97% - 100%)  Wt(kg): --  I&O's Summary      PHYSICAL EXAM:  GEN: Intubated, sedated  HEENT: NCAT, EOMI  CV: RRR, Ns1/s2, no m/r/g, JVP not elevated  RESP: mechanical breath sounds b/l, no clear rales  ABD: soft, nt, nd  EXT: warm, 2+ pulses, no edema  SKIN: no visible lesions, rashes  NEURO: sedated  PSYCH: sedated    -------------------------------------------------------------------------------------------  LABS:                          8.3    12.35 )-----------( 202      ( 13 Dec 2023 12:10 )             26.2     12-13    138  |  105  |  38<H>  ----------------------------<  210<H>  4.5   |  11<L>  |  2.84<H>    Ca    8.7      13 Dec 2023 12:10    TPro  6.5  /  Alb  3.5  /  TBili  0.7  /  DBili  x   /  AST  259<H>  /  ALT  163<H>  /  AlkPhos  34<L>  12-13    PT/INR - ( 13 Dec 2023 12:10 )   PT: 12.8 sec;   INR: 1.15 ratio         PTT - ( 13 Dec 2023 12:10 )  PTT:30.2 sec  CARDIAC MARKERS ( 13 Dec 2023 12:10 )  x     / x     / x     / 682 U/L / x     / 33.3 ng/mL  CARDIAC MARKERS ( 13 Dec 2023 07:50 )  x     / x     / x     / 308 U/L / x     / 12.1 ng/mL    fentaNYL   Infusion. 0.5 MICROgram(s)/kG/Hr IV Continuous <Continuous>  propofol Infusion 50 MICROgram(s)/kG/Min IV Continuous <Continuous>    -------------------------------------------------------------------------------------------  Meds:    -------------------------------------------------------------------------------------------  Cardiovascular Diagnostic Testing:      -------------------------------------------------------------------------------------------  Assessment and Plan:   Mr. Campbell is a 89yo man w/ HTN and DM who presented to [facility] after episode of LOC found to be in AFib w/ RVR w/ anterolateral STDs c/b another bradycardic/PEA arrest w/ ROSC now transferred to [facility] found to have elevated trop, ALEXI, leukocytosis w/ EKG now showing sinus tach w/ no ST changes for which cardiology is being consulted.    #Cardiac Arrest  #AFib w/ RVR  #Troponin elevation 2/2 to type 2 NSTEMI  #HTN  Unclear [id number] - at least 3 for age and HTN, unclear if hx of DM. Pt w/ unclear hx, however per pts meds appears to have HTN and possibly DM. Pt w/ LOC episode, unclear if syncope or if seizure or other neurological process. Presented to [facility] w/o CP but in AFib w/ RVR in the 140s w/ anterolateral STDs, trop was elevated. Pt then had another event of LOC/syncope and then became bradycardic w/ a PEA arrest per tele strip, s/p ACLS w/ ROSC requiring intubation. Post-ROSC EKG w/ sinus tach w/ and non-ischemic w/ resolution of STDs. Overall etiology of LOC episodes and arrest is unclear, however low suspicion that this is primary ACS. No VT/VF or ANNETTE to suggest ACS as underlying process of arrest. Pt likely does have underlying CAD, and during rapid AFib event likely had mild ischemia, 2/2 to demand iso rapid HRs, leading to trop elevation, but ST changes have normalized and pt was reportedly w/o CP/sx at the time. Possible pt had a vagal episode leading to syncope / bradycardia.     Overall low suspicion for active ACS, no signs of cardiogenic shock (pt not appearing overloaded and is warm on exam), and no current unstable arrhythmias.     Recommendations:  - no current indication for CCU admission  - can hold on starting AC iso AFib episode until more stabilized and further w/up  - no indication for ACS treatment at this time  - please get a TTE  - trend trop to peak  - please get lipids, TSH, A1c    *** Recommendations are preliminary until cosigned by the attending.    Shashi Wells MD  Cardiology Fellow    For all New Consults and Questions:  [email or web address]   Login: [email or web address]   Cardiology Consult Note   [Please check [email or web address] password: [email or web address]" for cardiology service schedule and contact information]    History of Present Illness:   Mr. Campbell is a 87yo man w/ HTN and DM who was transferred to [facility] after cardiac arrest for further care, cardiology is being consulted as there was report of possible VT arrest.    Pt is from the , [place] not recieve medical care in the [place]. He is visiting his daughter. Per his daughter, this morning he had a event of LOC that lasted a few minutes where his daughter found him on the ground, unresponsive, w/ gaze deviation and rigid. She is unsure if there was any seizure, but denies any hx of seizures or neurological issues. He regained consciousness spontaneously and was confused / lethargic for a few minutes but was able to recognize his daughter. He was brought to the hospital where he was found to be afebrile w/ normal BP, and tachycardic w/ AFib w/ RVR w/ rates in the 140-150s w/ STD in the anterolateral leads w/o any ANNETTE. While at  he denied CP, SOB, abd pain, c/d/n/v, LE swelling. While in the ED at  he had an acute episode of reported gaze deviation and unresponsiveness. During the episode he became hypoxic, and per tele strips sent w/ the pt became bradycardic w/ intermittent PVCs, but did not have any evidence of VT or VFib per the strips. He then lost a pulse and ACLS was started w/ ROSC after 8min. He was intubated and started on prop and levophed iso mild h. CTH showed no bleed. Labs there were notable for a leukocytosis, anemia, Cr 3.3, acidosis, HS-trop I 3300, w/ a ABG showed 7.16 / 31 / 429. CXR was relatively clear w/ no clear consolidation or signs of fluid overload. He was then transferred to [facility] for further evaluation.     On arrival at [facility] pt was intubated and sedated. He was HDS on low dose prop and EKG here was NSR w/ non signs of ischemic changes. Labs here showed similar CBC, continued acidosis, Cr 2.84,  / , HS-Trop T 1100, , VBG 7.15 / 12.8.    EKG:  Sinus tach at 107BPM w/ normal axis, normal intervals, no ST-TW changes, infrequent PAC, non-ischemic    HOME CARDIAC MEDS:  Candesartan-HCTZ 32-25mg  Dapagliflozin 10mg  Fenofibrate 160mg    PMHx: reviewed, see below  SOCIAL HISTORY:  unchanged    MEDICATIONS:  norepinephrine Infusion 0.05 MICROgram(s)/kG/Min IV Continuous <Continuous>  fentaNYL   Infusion. 0.5 MICROgram(s)/kG/Hr IV Continuous <Continuous>  propofol Infusion 50 MICROgram(s)/kG/Min IV Continuous <Continuous>  chlorhexidine 0.12% Liquid 15 milliLiter(s) Oral Mucosa every 12 hours  influenza  Vaccine (HIGH DOSE) 0.7 milliLiter(s) IntraMuscular once      REVIEW OF SYSTEMS:  Unable to perform, as pt is intuabted    All other review of systems is negative unless indicated above.   -------------------------------------------------------------------------------------------  VITALS:  T(C): 36.3 (12-13-23 @ 11:13), Max: 37 (12-13-23 @ 08:00)  HR: 94 (12-13-23 @ 12:45) (82 - 157)  BP: 141/73 (12-13-23 @ 12:45) (62/49 - 187/73)  RR: 18 (12-13-23 @ 12:45) (12 - 26)  SpO2: 100% (12-13-23 @ 12:45) (97% - 100%)  Wt(kg): --  I&O's Summary      PHYSICAL EXAM:  GEN: Intubated, sedated  HEENT: NCAT, EOMI  CV: RRR, Ns1/s2, no m/r/g, JVP not elevated  RESP: mechanical breath sounds b/l, no clear rales  ABD: soft, nt, nd  EXT: warm, 2+ pulses, no edema  SKIN: no visible lesions, rashes  NEURO: sedated  PSYCH: sedated    -------------------------------------------------------------------------------------------  LABS:                          8.3    12.35 )-----------( 202      ( 13 Dec 2023 12:10 )             26.2     12-13    138  |  105  |  38<H>  ----------------------------<  210<H>  4.5   |  11<L>  |  2.84<H>    Ca    8.7      13 Dec 2023 12:10    TPro  6.5  /  Alb  3.5  /  TBili  0.7  /  DBili  x   /  AST  259<H>  /  ALT  163<H>  /  AlkPhos  34<L>  12-13    PT/INR - ( 13 Dec 2023 12:10 )   PT: 12.8 sec;   INR: 1.15 ratio         PTT - ( 13 Dec 2023 12:10 )  PTT:30.2 sec  CARDIAC MARKERS ( 13 Dec 2023 12:10 )  x     / x     / x     / 682 U/L / x     / 33.3 ng/mL  CARDIAC MARKERS ( 13 Dec 2023 07:50 )  x     / x     / x     / 308 U/L / x     / 12.1 ng/mL    fentaNYL   Infusion. 0.5 MICROgram(s)/kG/Hr IV Continuous <Continuous>  propofol Infusion 50 MICROgram(s)/kG/Min IV Continuous <Continuous>    -------------------------------------------------------------------------------------------  Meds:    -------------------------------------------------------------------------------------------  Cardiovascular Diagnostic Testing:      -------------------------------------------------------------------------------------------  Assessment and Plan:   Mr. Campbell is a 87yo man w/ HTN and DM who presented to [facility] after episode of LOC found to be in AFib w/ RVR w/ anterolateral STDs c/b another bradycardic/PEA arrest w/ ROSC now transferred to [facility] found to have elevated trop, ALEXI, leukocytosis w/ EKG now showing sinus tach w/ no ST changes for which cardiology is being consulted.    #Cardiac Arrest  #AFib w/ RVR  #Troponin elevation 2/2 to type 2 NSTEMI  #HTN  Unclear [id number] - at least 3 for age and HTN, unclear if hx of DM. Pt w/ unclear hx, however per pts meds appears to have HTN and possibly DM. Pt w/ LOC episode, unclear if syncope or if seizure or other neurological process. Presented to [facility] w/o CP but in AFib w/ RVR in the 140s w/ anterolateral STDs, trop was elevated. Pt then had another event of LOC/syncope and then became bradycardic w/ a PEA arrest per tele strip, s/p ACLS w/ ROSC requiring intubation. Post-ROSC EKG w/ sinus tach w/ and non-ischemic w/ resolution of STDs. Overall etiology of LOC episodes and arrest is unclear, however low suspicion that this is primary ACS. No VT/VF or ANNETTE to suggest ACS as underlying process of arrest. Pt likely does have underlying CAD, and during rapid AFib event likely had mild ischemia, 2/2 to demand iso rapid HRs, leading to trop elevation, but ST changes have normalized and pt was reportedly w/o CP/sx at the time. Possible pt had a vagal episode leading to syncope / bradycardia.     Overall low suspicion for active ACS, no signs of cardiogenic shock (pt not appearing overloaded and is warm on exam), and no current unstable arrhythmias.     Recommendations:  - no current indication for CCU admission  - can hold on starting AC iso AFib episode until more stabilized and further w/up  - no indication for ACS treatment at this time  - please get a TTE  - trend trop to peak  - please get lipids, TSH, A1c    *** Recommendations are preliminary until cosigned by the attending.    Shashi Wells MD  Cardiology Fellow    For all New Consults and Questions:  [email or web address]   Login: [email or web address]   Cardiology Consult Note   [Please check [email or web address] password: [email or web address]" for cardiology service schedule and contact information]    History of Present Illness:   Mr. Campbell is a 89yo man w/ HTN and DM who was transferred to [facility] after cardiac arrest for further care, cardiology is being consulted as there was report of possible VT arrest.    Pt is from the , [place] not recieve medical care in the [place]. He is visiting his daughter. Per his daughter, this morning he had a event of LOC that lasted a few minutes where his daughter found him on the ground, unresponsive, w/ gaze deviation and rigid. She is unsure if there was any seizure, but denies any hx of seizures or neurological issues. He regained consciousness spontaneously and was confused / lethargic for a few minutes but was able to recognize his daughter. He was brought to the hospital where he was found to be afebrile w/ normal BP, and tachycardic w/ AFib w/ RVR w/ rates in the 140-150s w/ STD in the anterolateral leads w/o any ANNETTE. While at  he denied CP, SOB, abd pain, c/d/n/v, LE swelling. While in the ED at  he had an acute episode of reported gaze deviation and unresponsiveness. During the episode he became hypoxic, and per tele strips sent w/ the pt became bradycardic w/ intermittent PVCs, but did not have any evidence of VT or VFib per the strips. He then lost a pulse and ACLS was started w/ ROSC after 8min. He was intubated and started on prop and levophed iso mild h. CTH showed no bleed. Labs there were notable for a leukocytosis, anemia, Cr 3.3, acidosis, HS-trop I 3300, w/ a ABG showed 7.16 / 31 / 429. CXR was relatively clear w/ no clear consolidation or signs of fluid overload. He was then transferred to [facility] for further evaluation.     On arrival at [facility] pt was intubated and sedated. He was HDS on low dose prop and EKG here was NSR w/ non signs of ischemic changes. Labs here showed similar CBC, continued acidosis, Cr 2.84,  / , HS-Trop T 1100, , VBG 7.15 / 12.8.    EKG:  Sinus tach at 107BPM w/ normal axis, normal intervals, no ST-TW changes, infrequent PAC, non-ischemic    HOME CARDIAC MEDS:  Candesartan-HCTZ 32-25mg  Dapagliflozin 10mg  Fenofibrate 160mg    PMHx: reviewed, see below  SOCIAL HISTORY:  unchanged    MEDICATIONS:  norepinephrine Infusion 0.05 MICROgram(s)/kG/Min IV Continuous <Continuous>  fentaNYL   Infusion. 0.5 MICROgram(s)/kG/Hr IV Continuous <Continuous>  propofol Infusion 50 MICROgram(s)/kG/Min IV Continuous <Continuous>  chlorhexidine 0.12% Liquid 15 milliLiter(s) Oral Mucosa every 12 hours  influenza  Vaccine (HIGH DOSE) 0.7 milliLiter(s) IntraMuscular once      REVIEW OF SYSTEMS:  Unable to perform, as pt is intuabted    All other review of systems is negative unless indicated above.   -------------------------------------------------------------------------------------------  VITALS:  T(C): 36.3 (12-13-23 @ 11:13), Max: 37 (12-13-23 @ 08:00)  HR: 94 (12-13-23 @ 12:45) (82 - 157)  BP: 141/73 (12-13-23 @ 12:45) (62/49 - 187/73)  RR: 18 (12-13-23 @ 12:45) (12 - 26)  SpO2: 100% (12-13-23 @ 12:45) (97% - 100%)  Wt(kg): --  I&O's Summary      PHYSICAL EXAM:  GEN: Intubated, sedated  HEENT: NCAT, EOMI  CV: RRR, Ns1/s2, no m/r/g, JVP not elevated  RESP: mechanical breath sounds b/l, no clear rales  ABD: soft, nt, nd  EXT: warm, 2+ pulses, no edema  SKIN: no visible lesions, rashes  NEURO: sedated  PSYCH: sedated    -------------------------------------------------------------------------------------------  LABS:                          8.3    12.35 )-----------( 202      ( 13 Dec 2023 12:10 )             26.2     12-13    138  |  105  |  38<H>  ----------------------------<  210<H>  4.5   |  11<L>  |  2.84<H>    Ca    8.7      13 Dec 2023 12:10    TPro  6.5  /  Alb  3.5  /  TBili  0.7  /  DBili  x   /  AST  259<H>  /  ALT  163<H>  /  AlkPhos  34<L>  12-13    PT/INR - ( 13 Dec 2023 12:10 )   PT: 12.8 sec;   INR: 1.15 ratio         PTT - ( 13 Dec 2023 12:10 )  PTT:30.2 sec  CARDIAC MARKERS ( 13 Dec 2023 12:10 )  x     / x     / x     / 682 U/L / x     / 33.3 ng/mL  CARDIAC MARKERS ( 13 Dec 2023 07:50 )  x     / x     / x     / 308 U/L / x     / 12.1 ng/mL    fentaNYL   Infusion. 0.5 MICROgram(s)/kG/Hr IV Continuous <Continuous>  propofol Infusion 50 MICROgram(s)/kG/Min IV Continuous <Continuous>    -------------------------------------------------------------------------------------------  Meds:    -------------------------------------------------------------------------------------------  Cardiovascular Diagnostic Testing:      -------------------------------------------------------------------------------------------  Assessment and Plan:   Mr. Campbell is a 89yo man w/ HTN and DM who presented to [facility] after episode of LOC found to be in AFib w/ RVR w/ anterolateral STDs c/b another bradycardic/PEA arrest w/ ROSC now transferred to [facility] found to have elevated trop, ALEXI, leukocytosis w/ EKG now showing sinus tach w/ no ST changes for which cardiology is being consulted.    #Cardiac Arrest  #AFib w/ RVR  #Troponin elevation 2/2 to type 2 NSTEMI  #HTN  Unclear [id number] - at least 3 for age and HTN, unclear if hx of DM. Pt w/ unclear hx, however per pts meds appears to have HTN and possibly DM. Pt w/ LOC episode, unclear if syncope or if seizure or other neurological process. Presented to [facility] w/o CP but in AFib w/ RVR in the 140s w/ anterolateral STDs, trop was elevated. Pt then had another event of LOC/syncope and then became bradycardic w/ a PEA arrest per tele strip, s/p ACLS w/ ROSC requiring intubation. Post-ROSC EKG w/ sinus tach w/ and non-ischemic w/ resolution of STDs. Overall etiology of LOC episodes and arrest is unclear, however low suspicion that this is primary ACS. No VT/VF or ANNETTE to suggest ACS as underlying process of arrest. Pt likely does have underlying CAD, and during rapid AFib event likely had mild ischemia, 2/2 to demand iso rapid HRs, leading to trop elevation, but ST changes have normalized and pt was reportedly w/o CP/sx at the time. Possible pt had a vagal episode leading to syncope / bradycardia.     Overall low suspicion for active ACS, no signs of cardiogenic shock (pt not appearing overloaded and is warm on exam), and no current unstable arrhythmias. Would consider other etiologies for his arrest, including possible metabolic iso severe metabolic acidosis as well as other neurological processes.    Recommendations:  - no current indication for CCU admission  - no indication for ACS treatment at this time  - consider neurological w/up for possible seizure vs CVA  - would consider w/up for possible euglycemic DKA iso SGLT2i use, check urine for ketones  - can hold on starting AC iso AFib episode until more stabilized and further w/up  - please get a TTE  - trend trop to peak  - please get lipids, TSH, A1c    *** Recommendations are preliminary until cosigned by the attending.    Shashi Wells MD  Cardiology Fellow    For all New Consults and Questions:  [email or web address]   Login: [email or web address]   Cardiology Consult Note   [Please check [email or web address] password: [email or web address]" for cardiology service schedule and contact information]    History of Present Illness:   Mr. Campbell is a 87yo man w/ HTN and DM who was transferred to [facility] after cardiac arrest for further care, cardiology is being consulted as there was report of possible VT arrest.    Pt is from the , [place] not recieve medical care in the [place]. He is visiting his daughter. Per his daughter, this morning he had a event of LOC that lasted a few minutes where his daughter found him on the ground, unresponsive, w/ gaze deviation and rigid. She is unsure if there was any seizure, but denies any hx of seizures or neurological issues. He regained consciousness spontaneously and was confused / lethargic for a few minutes but was able to recognize his daughter. He was brought to the hospital where he was found to be afebrile w/ normal BP, and tachycardic w/ AFib w/ RVR w/ rates in the 140-150s w/ STD in the anterolateral leads w/o any ANNETTE. While at  he denied CP, SOB, abd pain, c/d/n/v, LE swelling. While in the ED at  he had an acute episode of reported gaze deviation and unresponsiveness. During the episode he became hypoxic, and per tele strips sent w/ the pt became bradycardic w/ intermittent PVCs, but did not have any evidence of VT or VFib per the strips. He then lost a pulse and ACLS was started w/ ROSC after 8min. He was intubated and started on prop and levophed iso mild h. CTH showed no bleed. Labs there were notable for a leukocytosis, anemia, Cr 3.3, acidosis, HS-trop I 3300, w/ a ABG showed 7.16 / 31 / 429. CXR was relatively clear w/ no clear consolidation or signs of fluid overload. He was then transferred to [facility] for further evaluation.     On arrival at [facility] pt was intubated and sedated. He was HDS on low dose prop and EKG here was NSR w/ non signs of ischemic changes. Labs here showed similar CBC, continued acidosis, Cr 2.84,  / , HS-Trop T 1100, , VBG 7.15 / 12.8.    EKG:  Sinus tach at 107BPM w/ normal axis, normal intervals, no ST-TW changes, infrequent PAC, non-ischemic    HOME CARDIAC MEDS:  Candesartan-HCTZ 32-25mg  Dapagliflozin 10mg  Fenofibrate 160mg    PMHx: reviewed, see below  SOCIAL HISTORY:  unchanged    MEDICATIONS:  norepinephrine Infusion 0.05 MICROgram(s)/kG/Min IV Continuous <Continuous>  fentaNYL   Infusion. 0.5 MICROgram(s)/kG/Hr IV Continuous <Continuous>  propofol Infusion 50 MICROgram(s)/kG/Min IV Continuous <Continuous>  chlorhexidine 0.12% Liquid 15 milliLiter(s) Oral Mucosa every 12 hours  influenza  Vaccine (HIGH DOSE) 0.7 milliLiter(s) IntraMuscular once      REVIEW OF SYSTEMS:  Unable to perform, as pt is intuabted    All other review of systems is negative unless indicated above.   -------------------------------------------------------------------------------------------  VITALS:  T(C): 36.3 (12-13-23 @ 11:13), Max: 37 (12-13-23 @ 08:00)  HR: 94 (12-13-23 @ 12:45) (82 - 157)  BP: 141/73 (12-13-23 @ 12:45) (62/49 - 187/73)  RR: 18 (12-13-23 @ 12:45) (12 - 26)  SpO2: 100% (12-13-23 @ 12:45) (97% - 100%)  Wt(kg): --  I&O's Summary      PHYSICAL EXAM:  GEN: Intubated, sedated  HEENT: NCAT, EOMI  CV: RRR, Ns1/s2, no m/r/g, JVP not elevated  RESP: mechanical breath sounds b/l, no clear rales  ABD: soft, nt, nd  EXT: warm, 2+ pulses, no edema  SKIN: no visible lesions, rashes  NEURO: sedated  PSYCH: sedated    -------------------------------------------------------------------------------------------  LABS:                          8.3    12.35 )-----------( 202      ( 13 Dec 2023 12:10 )             26.2     12-13    138  |  105  |  38<H>  ----------------------------<  210<H>  4.5   |  11<L>  |  2.84<H>    Ca    8.7      13 Dec 2023 12:10    TPro  6.5  /  Alb  3.5  /  TBili  0.7  /  DBili  x   /  AST  259<H>  /  ALT  163<H>  /  AlkPhos  34<L>  12-13    PT/INR - ( 13 Dec 2023 12:10 )   PT: 12.8 sec;   INR: 1.15 ratio         PTT - ( 13 Dec 2023 12:10 )  PTT:30.2 sec  CARDIAC MARKERS ( 13 Dec 2023 12:10 )  x     / x     / x     / 682 U/L / x     / 33.3 ng/mL  CARDIAC MARKERS ( 13 Dec 2023 07:50 )  x     / x     / x     / 308 U/L / x     / 12.1 ng/mL    fentaNYL   Infusion. 0.5 MICROgram(s)/kG/Hr IV Continuous <Continuous>  propofol Infusion 50 MICROgram(s)/kG/Min IV Continuous <Continuous>    -------------------------------------------------------------------------------------------  Meds:    -------------------------------------------------------------------------------------------  Cardiovascular Diagnostic Testing:      -------------------------------------------------------------------------------------------  Assessment and Plan:   Mr. Campbell is a 87yo man w/ HTN and DM who presented to [facility] after episode of LOC found to be in AFib w/ RVR w/ anterolateral STDs c/b another bradycardic/PEA arrest w/ ROSC now transferred to [facility] found to have elevated trop, ALEXI, leukocytosis w/ EKG now showing sinus tach w/ no ST changes for which cardiology is being consulted.    #Cardiac Arrest  #AFib w/ RVR  #Troponin elevation 2/2 to type 2 NSTEMI  #HTN  Unclear [id number] - at least 3 for age and HTN, unclear if hx of DM. Pt w/ unclear hx, however per pts meds appears to have HTN and possibly DM. Pt w/ LOC episode, unclear if syncope or if seizure or other neurological process. Presented to [facility] w/o CP but in AFib w/ RVR in the 140s w/ anterolateral STDs, trop was elevated. Pt then had another event of LOC/syncope and then became bradycardic w/ a PEA arrest per tele strip, s/p ACLS w/ ROSC requiring intubation. Post-ROSC EKG w/ sinus tach w/ and non-ischemic w/ resolution of STDs. Overall etiology of LOC episodes and arrest is unclear, however low suspicion that this is primary ACS. No VT/VF or ANNETTE to suggest ACS as underlying process of arrest. Pt likely does have underlying CAD, and during rapid AFib event likely had mild ischemia, 2/2 to demand iso rapid HRs, leading to trop elevation, but ST changes have normalized and pt was reportedly w/o CP/sx at the time. Possible pt had a vagal episode leading to syncope / bradycardia.     Overall low suspicion for active ACS, no signs of cardiogenic shock (pt not appearing overloaded and is warm on exam), and no current unstable arrhythmias. Would consider other etiologies for his arrest, including possible metabolic iso severe metabolic acidosis as well as other neurological processes.    Recommendations:  - no current indication for CCU admission  - no indication for ACS treatment at this time  - consider neurological w/up for possible seizure vs CVA  - would consider w/up for possible euglycemic DKA iso SGLT2i use, check urine for ketones  - can hold on starting AC iso AFib episode until more stabilized and further w/up  - please get a TTE  - trend trop to peak  - please get lipids, TSH, A1c    *** Recommendations are preliminary until cosigned by the attending.    Shashi Wells MD  Cardiology Fellow    For all New Consults and Questions:  [email or web address]   Login: [employer]

## 2023-12-13 NOTE — ED ADULT NURSE NOTE - OBJECTIVE STATEMENT
Pt presents the ed from home with daughter who states pt has been  having his eyes roll and was shaking. Pt was noted to be awake, alert and oriented x2, no slurred speech or facial droop was noted. Pt was noted to be flushed in the face, equal hand grasp noted, pt was placed on cardiac monitor where he was noted to be afib with RVR to 160's and hypotensive.

## 2023-12-13 NOTE — ED ADULT NURSE REASSESSMENT NOTE - NS ED NURSE REASSESS COMMENT FT1
Mobile Critical Care RN: Pt received intubated and sedated on Propofol and Fentanyl. 2mm pupils, equal, sluggish. Pt moving b/l lower extremities. 7.0 ETT, 23 lip AC 18/450/5/50 satting 100%.  Levophed gtt titrated as tolerated. PIV x3 with blood return. OGT. 16F [place] with clear yellow urine. MICU at bedside.

## 2023-12-13 NOTE — ED ADULT NURSE NOTE - CHIEF COMPLAINT QUOTE
pt brought in by EMS from [place] as a transfer. pt brought directly to room Tr B pt brought in by EMS from [place] as a transfer. pt brought directly to room Tr B

## 2023-12-13 NOTE — H&P ADULT - ASSESSMENT
Mr. Campbell is a 89yo man w/ HTN and DM who [facility] episode of LOC found to be in AFib  c/b another bradycardic/PEA arrest w/ ROSC after 8 minutes now transferred to [facility] found to have elevated trop, ALEXI, leukocytosis w/ EKG now showing sinus tach w/ no ST changes. Patient transferred to [facility] intubated. Patient transferred to MICU for higher level of care.     Plan:    =======Neuro=======  -aaox0, intubated and sedated  - aao x3 at baseline    #intubated/sedated:  -currently intubated on RR18, FIO2:50   -  sedated on fentanyl and propofol  [ ] wean prop  [ ] obtain EEG    =======Resp=======  - intubated   - on FiO2: 50  [ ] decrease FiO2 as tolerated  [ ] obtain repeat ABG    =======CV=======  #HTN  #cardiac arrest    - trend cardiac enzymes  [ ] obtain TTE  [ ] obtain BNP  [ ] obtain LE doppler    =======GI=======  - og placed  [ ] start tube feeds    =======Endo=======  [ ] obtain beta hydroxybuterate  [ ] q6 finger sticks while NPO/tube feeds    ==============  [ ] obtain nepro consult  [ ] obtain urine culture  [ ] obtain urine lytes      =======ID=======  [ ] obtain bcx  [ ] obtain ua    =======Ethics=======   full code Mr. Campbell is a 89yo man w/ HTN and DM who [facility] found to be in AFib  c/b another bradycardic/PEA arrest w/ ROSC after 8 minutes now transferred to [facility] found to have elevated trop, ALEXI, leukocytosis w/ EKG now showing sinus tach w/ no ST changes. Patient transferred to [facility] intubated. Patient transferred to MICU for higher level of care.     Plan:    =======Neuro=======  -aaox0, intubated and sedated  - aao x3 at baseline    #intubated/sedated:  -currently intubated on RR18, FIO2:50   -  sedated on fentanyl and propofol  [ ] wean prop  [ ] obtain EEG    =======Resp=======  - intubated   - on FiO2: 50  [ ] decrease FiO2 as tolerated  [ ] obtain repeat ABG    =======CV=======  #HTN  #cardiac arrest    - trend cardiac enzymes  [ ] obtain TTE  [ ] obtain BNP  [ ] obtain LE doppler    =======GI=======  - og placed  [ ] start tube feeds    =======Endo=======  [ ] obtain beta hydroxybuterate  [ ] q6 finger sticks while NPO/tube feeds    ==============  [ ] obtain nepro consult  [ ] obtain urine culture  [ ] obtain urine lytes      =======ID=======  [ ] obtain bcx  [ ] obtain ua    =======Ethics=======   full code Mr. Campbell is a 89yo man w/ HTN and DM who presented to [facility] after episode of LOC found to be in AFib  c/b bradycardic/PEA arrest w/ ROSC after 8 minutes with 1 epi,  now transferred to [facility] for cardiac eval. Low suspicion for cardiac etiology for cardiac arrest. Patient transferred to MICU for higher level of care.     Plan:    =======Neuro=======  #intubated/sedated:  -currently intubated on RR18, FIO2:50   -  sedated on fentanyl and propofol  [ ] wean prop  [ ] obtain EEG  - prevent hyperthermia     =======Resp=======  - intubated   - on FiO2: 50  [ ] decrease FiO2 as tolerated  [ ] obtain repeat ABG     =======CV=======  #HTN  #cardiac arrest    - trend cardiac enzymes  [ ] obtain TTE  [ ] obtain BNP  [ ] obtain LE doppler  - Hold home HTN medications     =======GI=======  #No acute issue   - Place OGT   - start tube feeds when OGT confirmed     =======Endo=======  #T2DM   - ISS , NPO until OGT confirmed. Can do FS q6 for now.;   - Hold home SGLT-2   - obtain beta hydroxybuterate  - q6 finger sticks while NPO/tube feeds    =======Renal  #ALEXI   - SCr 2.84 (unclear baseline)   - C/s nephro   - UA with urine lytes   - Avoid nephrotoxin agents   - Renally dose meds   - PLace bains       =======ID=======  #No acute issues   - Monitor off abx   [ ] obtain bcx  [ ] obtain ua    =======Ethics=======   full code Mr. Campbell is a 89yo man w/ HTN and DM who presented to [facility] after episode of LOC found to be in AFib  c/b bradycardic/PEA arrest w/ ROSC after 8 minutes with 1 epi,  now transferred to [facility] for cardiac eval. Low suspicion for cardiac etiology for cardiac arrest. Patient transferred to MICU for higher level of care.     Plan:    =======Neuro=======  #intubated/sedated:  -currently intubated on RR18, FIO2:50   -  sedated on fentanyl and propofol  [ ] wean prop  [ ] obtain EEG  - prevent hyperthermia     =======Resp=======  - intubated   - on FiO2: 50  [ ] decrease FiO2 as tolerated  [ ] obtain repeat ABG     =======CV=======  #HTN  #cardiac arrest    - trend cardiac enzymes  [ ] obtain TTE  [ ] obtain BNP  [ ] obtain LE doppler  - Hold home HTN medications     =======GI=======  #No acute issue   - Place OGT   - start tube feeds when OGT confirmed     =======Endo=======  #T2DM   - ISS , NPO until OGT confirmed. Can do FS q6 for now.;   - Hold home SGLT-2   - obtain beta hydroxybuterate  - q6 finger sticks while NPO/tube feeds    =======Renal  #ALEXI   - SCr 2.84 (unclear baseline)   - C/s nephro   - UA with urine lytes   - Avoid nephrotoxin agents   - Renally dose meds   - PLace bains       =======ID=======  #No acute issues   - Monitor off abx   [ ] obtain bcx  [ ] obtain ua    =======Ethics=======   full code

## 2023-12-13 NOTE — ED ADULT TRIAGE NOTE - CHIEF COMPLAINT QUOTE
pt brought in by EMS from [place] as a transfer. pt brought directly to room Tr B pt brought in by EMS from [place] as a transfer. pt brought directly to room Tr B

## 2023-12-13 NOTE — PATIENT PROFILE ADULT - FALL HARM RISK - HARM RISK INTERVENTIONS
Assistance with ambulation/Assistance OOB with selected safe patient handling equipment/Communicate Risk of Fall with Harm to all staff/Discuss with provider need for PT consult/Monitor gait and stability/Provide patient with walking aids - walker, cane, crutches/Reinforce activity limits and safety measures with patient and family/Tailored Fall Risk Interventions/Visual Cue: Yellow wristband and red socks/Bed in lowest position, wheels locked, appropriate side rails in place/Physically safe environment - no spills, clutter or unnecessary equipment/Purposeful Proactive Rounding/Unable to comprehend

## 2023-12-13 NOTE — ED PROVIDER NOTE - PROGRESS NOTE DETAILS
EKG does not show stemi. cards called and aware [facility] at bedside, does not think that this is cardiac in nature at this time.  They recommend working up patient for other causes of cardiac arrest and recommend MICU consult.  MICU called and will come see patient.

## 2023-12-13 NOTE — ED ADULT NURSE NOTE - NSFALLHARMRISKINTERV_ED_ALL_ED
Communicate risk of Fall with Harm to all staff, patient, and family/Provide visual cue: red socks, yellow wristband, yellow gown, etc/Reinforce activity limits and safety measures with patient and family/Bed in lowest position, wheels locked, appropriate side rails in place/Call bell, personal items and telephone in reach/Instruct patient to call for assistance before getting out of bed/chair/stretcher/Non-slip footwear applied when patient is off stretcher/[place] to call system/Physically safe environment - no spills, clutter or unnecessary equipment/Purposeful Proactive Rounding/Room/bathroom lighting operational, light cord in reach Communicate risk of Fall with Harm to all staff, patient, and family/Provide visual cue: red socks, yellow wristband, yellow gown, etc/Reinforce activity limits and safety measures with patient and family/Bed in lowest position, wheels locked, appropriate side rails in place/Call bell, personal items and telephone in reach/Instruct patient to call for assistance before getting out of bed/chair/stretcher/Non-slip footwear applied when patient is off stretcher/[place] to call system/Physically safe environment - no spills, clutter or unnecessary equipment/Purposeful Proactive Rounding/Room/bathroom lighting operational, light cord in reach

## 2023-12-13 NOTE — ED PROVIDER NOTE - CLINICAL SUMMARY MEDICAL DECISION MAKING FREE TEXT BOX
Toño - 88 year old male with h/i HTN, DM and HLD transferred from  after presenting with weakness and having a cardiac arrest in the ED - ROSC after 8 min, no shocks, EKG concerning for STEMI, transferred for cardiac eval. Pt arrives intubated, on propofol and levophed. Will continue meds and add fentanyl for sedation. Will recheck labs, EKG, consult cards.

## 2023-12-13 NOTE — CHART NOTE - NSCHARTNOTEFT_GEN_A_CORE
: Dae Hyeon Kim    INDICATION: SHock on pressors, cardiac arrest    PROCEDURE:  [x ] LIMITED ECHO  [ x] LIMITED CHEST  [ ] LIMITED RETROPERITONEAL  [x] LIMITED ABDOMINAL  [ ] LIMITED DVT  [ ] NEEDLE GUIDANCE VASCULAR  [ ] NEEDLE GUIDANCE THORACENTESIS  [ ] NEEDLE GUIDANCE PARACENTESIS  [ ] NEEDLE GUIDANCE PERICARDIOCENTESIS  [ ] OTHER    FINDINGS:  Bains catheter noted in a non-distended bladder  A-lines b/l. No plef, b/l basial consolidation with air bronchograms.   Limited cardiac views.  LV function appears mildly reduced but with near normal LVOT VTI of 18. Heavily calcified aortic valve. RV smaller vs LV. IVC dilated with no respiratory phasic variation.     INTERPRETATION:  Non distended bladder with bains in place  Lung concering for possible pneumonia  Possible AS, near normal LVSF.     Images uploaded on [employer]

## 2023-12-13 NOTE — ED ADULT TRIAGE NOTE - CHIEF COMPLAINT QUOTE
pt a&o x4 c.o of weakness and dizziness. no neuro deficits. biba from home as per daughter eyes rolled back and was shaking. pmh htn , dm, hld.

## 2023-12-13 NOTE — ED ADULT NURSE NOTE - ED STAT RN HANDOFF DETAILS
REPORT WAS GIVEN TO HUSSAIN RODRIGUES AT [facility] REPORT WAS GIVEN TO HUSSAIN RODRIGUES AT [facility]

## 2023-12-13 NOTE — H&P ADULT - NSHPLABSRESULTS_GEN_ALL_CORE
8.3    12.35 )-----------( 202      ( 13 Dec 2023 12:10 )             26.2       12-13    138  |  105  |  38<H>  ----------------------------<  210<H>  4.5   |  11<L>  |  2.84<H>    Ca    8.7      13 Dec 2023 12:10    TPro  6.5  /  Alb  3.5  /  TBili  0.7  /  DBili  x   /  AST  259<H>  /  ALT  163<H>  /  AlkPhos  34<L>  12-13      Mode: AC/ CMV (Assist Control/ Continuous Mandatory Ventilation)  RR (machine): 18  TV (machine): 450  FiO2: 50  PEEP: 5  ITime: 0.7      CARDIAC MARKERS ( 13 Dec 2023 12:10 )  x     / x     / 682 U/L / x     / 33.3 ng/mL  CARDIAC MARKERS ( 13 Dec 2023 07:50 )  x     / x     / 308 U/L / x     / 12.1 ng/mL      Urinalysis Basic - ( 13 Dec 2023 12:10 )    Color: x / Appearance: x / SG: x / pH: x  Gluc: 210 mg/dL / Ketone: x  / Bili: x / Urobili: x   Blood: x / Protein: x / Nitrite: x   Leuk Esterase: x / RBC: x / WBC x   Sq Epi: x / Non Sq Epi: x / Bacteria: x

## 2023-12-13 NOTE — ED ADULT NURSE NOTE - NSFALLUNIVINTERV_ED_ALL_ED
Bed/Stretcher in lowest position, wheels locked, appropriate side rails in place/Call bell, personal items and telephone in reach/Instruct patient to call for assistance before getting out of bed/chair/stretcher/Non-slip footwear applied when patient is off stretcher/[place] to call system/Physically safe environment - no spills, clutter or unnecessary equipment/Purposeful proactive rounding/Room/bathroom lighting operational, light cord in reach Bed/Stretcher in lowest position, wheels locked, appropriate side rails in place/Call bell, personal items and telephone in reach/Instruct patient to call for assistance before getting out of bed/chair/stretcher/Non-slip footwear applied when patient is off stretcher/[place] to call system/Physically safe environment - no spills, clutter or unnecessary equipment/Purposeful proactive rounding/Room/bathroom lighting operational, light cord in reach

## 2023-12-13 NOTE — ED PROVIDER NOTE - OBJECTIVE STATEMENT
88 year old male with h/i HTN, DM and HLD presents today camrynalfie from home accompanied with his daughter for evalution, pt is Yemeni speaking (his daughter Emerita at the bedside to translate), 88 year old male with h/i HTN, DM and HLD presents today camrynalfie from home accompanied with his daughter for evalution, pt is Wallisian speaking (his daughter Eemrita at the bedside to translate), 88 year old male with h/i HTN, DM and HLD presents today jenn from home accompanied with his daughter for evaluation, pt is Khmer speaking (his daughter Emerita at the bedside to translate), as per his daughter pt woke up at 2am to use the bathroom, she did check on him and reports that he was staring off, unresponsive with his upper body stiff, the episode lasted 2-3 min, when pt came to he did recognize her and was at his baseline, told her that he was ok and was able to sleep, (-) urinary or bowel incontinence +recent cold symptoms 88 year old male with h/i HTN, DM and HLD presents today jenn from home accompanied with his daughter for evaluation, pt is Lao speaking (his daughter Emerita at the bedside to translate), as per his daughter pt woke up at 2am to use the bathroom, she did check on him and reports that he was staring off, unresponsive with his upper body stiff, the episode lasted 2-3 min, when pt came to he did recognize her and was at his baseline, told her that he was ok and was able to sleep, (-) urinary or bowel incontinence +recent cold symptoms

## 2023-12-13 NOTE — ED PROVIDER NOTE - CLINICAL SUMMARY MEDICAL DECISION MAKING FREE TEXT BOX
88 year old male with h/i HTN, DM and HLD presents today jenn from home accompanied with his daughter for evaluation, pt is Malay speaking (his daughter Emerita at the bedside to translate), as per his daughter pt woke up at 2am to use the bathroom, she did check on him and reports that he was staring off, unresponsive with his upper body stiff, the episode lasted 2-3 min, when pt came to he did recognize her and was at his baseline, told her that he was ok and was able to sleep, (-) urinary or bowel incontinence +recent cold symptoms, on exam pt is awake and alert, able to speak in complete sentences and in no respiratory distress, reports mild chest pain, two large bore IV's started with normal saline under pressure bags with minimal improvement, pt did have an unresponsive episode, similar to what occurred at home as per daughter,  pads placed on patient in preparation for possible cardioversion, however, pt experienced a second episode of an unresponsive episode, became bradycardic, cyanotic and lost his pulses, code blue called, cpr initiated following acls protocol, rosc was achieved after one round of epi, pt's bp improved, now in sinus tach rhythm, trop >3000, ekg shows a fib with possible anterolateral subendocardial injury, transfer center called, case discussed with cardiology fellow, ? underlying acs, pt was accepted er to er, ct head done prior to transfer 88 year old male with h/i HTN, DM and HLD presents today jenn from home accompanied with his daughter for evaluation, pt is Danish speaking (his daughter Emerita at the bedside to translate), as per his daughter pt woke up at 2am to use the bathroom, she did check on him and reports that he was staring off, unresponsive with his upper body stiff, the episode lasted 2-3 min, when pt came to he did recognize her and was at his baseline, told her that he was ok and was able to sleep, (-) urinary or bowel incontinence +recent cold symptoms, on exam pt is awake and alert, able to speak in complete sentences and in no respiratory distress, reports mild chest pain, two large bore IV's started with normal saline under pressure bags with minimal improvement, pt did have an unresponsive episode, similar to what occurred at home as per daughter,  pads placed on patient in preparation for possible cardioversion, however, pt experienced a second episode of an unresponsive episode, became bradycardic, cyanotic and lost his pulses, code blue called, cpr initiated following acls protocol, rosc was achieved after one round of epi, pt's bp improved, now in sinus tach rhythm, trop >3000, ekg shows a fib with possible anterolateral subendocardial injury, transfer center called, case discussed with cardiology fellow, ? underlying acs, pt was accepted er to er, ct head done prior to transfer

## 2023-12-13 NOTE — H&P ADULT - ATTENDING COMMENTS
The patient is a 88 Y.O. male with pmh of T2DM and HTN. Patient on SGLT 2 inhibitor at home.     Usually lives in [place] Here to visit. Has had episodes of non-responsiveness but with recovery. Had some workup recently at hospital in [place] Patient had similar episode in today, presented to ED at OSH. In the ED had a cardiac arrest, 8 mins with PEA, had AFib, with 1 round of EPI. S/P ROSC. Initial concern for STEMI? with ST depression, transferred to the ED. Evaluated by cards no STEMI. Admitted to MICU.     Patient found to have elevated trops, ALEXI, in shock on pressors post arrest.    # ALEXI  # Cardiac arrest  # Aortic stenosis  # Shock on pressors  # Encephalopathy  # DKA-euglycemic   # Lactic acidosis  # NSTEMI  - S/P cardiac arrest PEA, now intubated and sedated. 8 min down time.   - CTH without acute findings, start EEG to r/o sz, wean off sedation if possible, precedex PRN  - Trend trops, per cards not likely to be ACS  - Possible severe AS on POCUS, pending offical TTE, may need CHARLOTTE.   - C/W levo, wean as tolerated  - May need MRI once stable, per family patient patient stopped using his left arm many months ago.   - on SGLT 2 inhibitor with low biacarb, elevated BHB. s/p 3 L in the ED at [facility]. Will limited amount of IVF. Start Dextrose, start insulin at lower level, check q4 BMP, BHB, in this case will start feeds.  - ALEXI- 2/2 to shock? place bains. Monitor IOs.  - DVT ppx- SQH  - Dispo- full code. D/w family 2 daughter at bedside. Overall poor prognosis. Will have continued GOC.

## 2023-12-13 NOTE — ED PROVIDER NOTE - ATTENDING CONTRIBUTION TO CARE
88 year old male with h/i HTN, DM and HLD transferred from  after presenting with weakness and having a cardiac arrest in the ED - ROSC after 8 min, no shocks, EKG concerning for STEMI, transferred for cardiac eval. Pt arrives intubated, on propofol and levophed. Labs at OSH concerning for elevated trop

## 2023-12-13 NOTE — PATIENT PROFILE ADULT - PUBLIC BENEFITS
no Thalidomide Pregnancy And Lactation Text: This medication is Pregnancy Category X and is absolutely contraindicated during pregnancy. It is unknown if it is excreted in breast milk.

## 2023-12-14 NOTE — PROGRESS NOTE ADULT - ATTENDING COMMENTS
Pt with DM on SGLT2i who presented with several minutes of LOC, Afib with RVR with deep ST depressions, and bradyarrhythmia of unclear etiology. Has a metabolic acidosis without a significant lactate and significantly elevated SCr which raises the concern for euglycemic diabetic ketoacidosis.  Review of available tele without clear VT; as such, suspect PEA with acidosis as a contributor. Repeat ECG without acute ST changes, thus suspect the patient more likely has obstructive CAD without ACS.    Check formal TTE  when clinically stable will benefit from ischemic evaluation

## 2023-12-14 NOTE — PROGRESS NOTE ADULT - CRITICAL CARE ATTENDING COMMENT
The patient is a 88 Y.O. male with pmh of T2DM and HTN. Patient on SGLT 2 inhibitor at home.     Usually lives in [place] Here to visit. Has had episodes of non-responsiveness but with recovery. Had some workup recently at hospital in [place] Patient had similar episode in today, presented to ED at OSH. In the ED had a cardiac arrest, 8 mins with PEA, had AFib, with 1 round of EPI. S/P ROSC. Initial concern for STEMI? with ST depression, transferred to the ED. Evaluated by cards no STEMI. Admitted to MICU.     Patient found to have elevated trops, ALEXI, in shock on pressors post arrest. also with likely euglycemic DKA on insulin gtt.     # ALEXI  # Cardiac arrest  # Aortic stenosis  # Shock on pressors  # Encephalopathy  # DKA-euglycemic   # Lactic acidosis  # NSTEMI  - S/P cardiac arrest PEA, now intubated and sedated. 8 min down time.   - CTH without acute findings, C/W EEG to r/o sz, wean off sedation, precedex PRN  - Trend trops, per cards not likely to be ACS, likely demand.   - Possible severe AS on POCUS, pending offical TTE, may need CHARLOTTE.   - C/W levo, wean as tolerated  - May need MRI once stable, per family patient patient stopped using his left arm many months ago.   - on SGLT 2 inhibitor with low biacarb, elevated BHB. s/p 3 L in the ED at [facility]. Will limited amount of IVF. Will transition from insulin gtt to basal and ISS given improvement if BHB. Low bicarb is multifactorial fiven renal failure and lactic acidosis.   - ALEXI- 2/2 to shock? place bains. Monitor IOs. possible with some CKD given renal US with one atrophic kidney.   - DVT ppx- SQH  - Dispo- full code. D/w family 2 daughter and son at bedside. Overall poor prognosis. Will have continued GOC.

## 2023-12-14 NOTE — DIETITIAN INITIAL EVALUATION ADULT - RD TO REMAIN AVAILABLE
Erin Wyatt RDN, CDN       pager [phone] or MS Teams/yes Erin Wyatt RDN, CDN       pager [phone] or MS Teams/yes

## 2023-12-14 NOTE — DIETITIAN INITIAL EVALUATION ADULT - NS FNS DIET ORDER
Diet, NPO with Tube Feed:   Tube Feeding Modality: Orogastric  Nepro with Carb Steady (NEPRORTH)  Total Volume for 24 Hours (mL): 720  Continuous  Starting Tube Feed Rate {mL per Hour}: 10  Increase Tube Feed Rate by (mL): 10     Every 4 hours  Until Goal Tube Feed Rate (mL per Hour): 30  Tube Feed Duration (in Hours): 24  Tube Feed Start Time: 09:34 (12-14-23 @ 09:34)      720mL total volume  1296 kcals (+ Propofol KCals)  58g protein  523mL free water

## 2023-12-14 NOTE — DIETITIAN INITIAL EVALUATION ADULT - REASON INDICATOR FOR ASSESSMENT
AFTER YOUR ENDOSCOPY/COLONOSCOPY    Date of Procedure:  3/15/2019    You had the following procedure(s) performed today:  Colonoscopy    Exam Results:  The physician removed 1 polyp(s), which will be sent to the lab for testing. Results can take up to 14 business days to be communicated to you.  Your physician will contact you with the result of your biopsy and when your follow-up colonoscopy is due    Diet Instructions:  You may resume your regular diet today. It is recommended to avoid heavy, greasy, and spicy foods today.    Medications:  You may resume your medications as prescribed.    Activity for Today:    · DO NOT DRIVE.  Do not operate any other heavy machinery or equipment.  · Avoid activities that require coordination (climbing ladders, using a knife/cooking equipment, etc.)  · Do not make important financial or legal decisions  · Do not return to work.  · Do not drink any alcohol.  · Rest the remainder of the day. You may resume your activity tomorrow.    It is normal to have.....    Colonoscopy / Sigmoidoscopy   · Mild abdominal distention and/or cramping are normal after these procedures, but should pass within an hour or two with the passage of air.  · A scant amount of rectal bleeding is normal following a biopsy, polypectomy or if you have hemorrhoids.   · Mild nausea and/or vomiting       When to call Dr. Stone at [phone]    For Colonoscopy / Sigmoidoscopy   · If you have unusual belly pain that is NOT relieved with passing air  · If you have rectal bleeding more than blood streaking on the toilet tissue  · If you have moderate nausea and/or vomiting         Go to the Emergency Room if you experience any of the following:  · Severe pain  · Difficulty breathing or swallowing  · Persistent vomiting  · Vomiting blood, either brown (coffee ground in consistency) or red  · Significant bright red, rectal bleeding  · Black colored or bloody stool  · Chills or fever over 101 degrees F.               
MST Score >2 (unsure of weight loss)

## 2023-12-14 NOTE — PROGRESS NOTE ADULT - SUBJECTIVE AND OBJECTIVE BOX
MICU NOTE    CHIEF COMPLAINT: Patient is a 88y old  Male who presents with a chief complaint of transfer for cardiac arrest (14 Dec 2023 08:03)    HPI:  Mr. Campbell is a 89yo man w/ HTN and DM who was transferred from [facility] after cardiac arrest for further care,     Pt is from the , [place] not recieve medical care in the [place]. He is visiting his daughter. Per his daughter, this morning he had a event of LOC that lasted a few minutes where his daughter found him on the ground, unresponsive, w/ gaze deviation and rigid. She is unsure if there was any seizure, but denies any hx of seizures or neurological issues. He regained consciousness spontaneously and was able to recognize his daughter. He was brought to [facility] where he was found to be afebrile w/ normal BP, and tachycardic w/ AFib wih rates in the 140-150s. While at [facility] pt denied CP, SOB, abd pain, c/d/n/v, LE swelling. While in the ED at [facility] he had an acute episode of reported gaze deviation and unresponsiveness. During the episode he became hypoxic, He then lost a pulse and ACLS was started w/ ROSC after 8min with one round of epi. He was intubated and started on prop and levophed. CTH showed no bleed.     Labs there were notable for a leukocytosis, anemia, Cr 3.3, acidosis, HS-trop I 3300, w/ a ABG showed 7.16 / 31 / 429. CXR was relatively clear w/ no clear consolidation or signs of fluid overload. He was then transferred to [facility] for further cardiac eval. Cardiology here at [facility] had low suspicion for Cardiac etiology for Cardiac arrest , recommended MICU consult. EKG notable for PVCs , no ST elevation / no ST depression.       (13 Dec 2023 14:02)      PAST MEDICAL & SURGICAL HISTORY:  Hypertension      Diabetes      HLD (hyperlipidemia)      H/O appendicitis          FAMILY HISTORY:      SOCIAL HISTORY:  Smoking:   Substance Use:   EtOH Use:   Advance Directives:     MEDICATIONS  (STANDING):  chlorhexidine 0.12% Liquid 15 milliLiter(s) Oral Mucosa every 12 hours  chlorhexidine 2% Cloths 1 Application(s) Topical two times a day  dextrose 5%. 1000 milliLiter(s) (50 mL/Hr) IV Continuous <Continuous>  dextrose 5%. 1000 milliLiter(s) (60 mL/Hr) IV Continuous <Continuous>  dextrose 50% Injectable 25 Gram(s) IV Push once  glucagon  Injectable 1 milliGRAM(s) IntraMuscular once  heparin   Injectable 5000 Unit(s) SubCutaneous every 8 hours  influenza  Vaccine (HIGH DOSE) 0.7 milliLiter(s) IntraMuscular once  insulin regular Infusion 1 Unit(s)/Hr (1 mL/Hr) IV Continuous <Continuous>  norepinephrine Infusion 0.05 MICROgram(s)/kG/Min (5.81 mL/Hr) IV Continuous <Continuous>  pantoprazole  Injectable 40 milliGRAM(s) IV Push daily  petrolatum Ophthalmic Ointment 1 Application(s) Both EYES two times a day  piperacillin/tazobactam IVPB.. 3.375 Gram(s) IV Intermittent every 12 hours  propofol Infusion 30 MICROgram(s)/kG/Min (11.3 mL/Hr) IV Continuous <Continuous>    MEDICATIONS  (PRN):  dextrose Oral Gel 15 Gram(s) Oral once PRN Blood Glucose LESS THAN 70 milliGRAM(s)/deciliter      Allergies    No Known Allergies    Intolerances        REVIEW OF SYSTEMS:  [X] Unable to assess ROS because ________    OBJECTIVE:  ICU Vital Signs Last 24 Hrs  T(C): 37.4 (14 Dec 2023 08:00), Max: 37.9 (14 Dec 2023 04:00)  T(F): 99.3 (14 Dec 2023 08:00), Max: 100.3 (14 Dec 2023 04:00)  HR: 78 (14 Dec 2023 08:00) (76 - 136)  BP: 116/73 (13 Dec 2023 15:15) (67/54 - 190/94)  BP(mean): 85 (13 Dec 2023 15:15) (63 - 120)  ABP: 128/50 (14 Dec 2023 08:00) (105/46 - 140/61)  ABP(mean): 77 (14 Dec 2023 08:00) (68 - 91)  RR: 24 (14 Dec 2023 08:00) (12 - 24)  SpO2: 100% (14 Dec 2023 08:00) (97% - 100%)    O2 Parameters below as of 14 Dec 2023 09:00  Patient On (Oxygen Delivery Method): ventilator    O2 Concentration (%): 30      Mode: AC/ CMV (Assist Control/ Continuous Mandatory Ventilation), RR (machine): 24, TV (machine): 420, FiO2: 30, PEEP: 5, ITime: 0.72, MAP: 10, PIP: 21    12-13 @ 07:01  -  12-14 @ 07:00  --------------------------------------------------------  IN: 1763.9 mL / OUT: 1970 mL / NET: -206.1 mL    12-14 @ 07:01  -  12-14 @ 08:14  --------------------------------------------------------  IN: 181.4 mL / OUT: 75 mL / NET: 106.4 mL      CAPILLARY BLOOD GLUCOSE      POCT Blood Glucose.: 103 mg/dL (14 Dec 2023 07:03)      PHYSICAL EXAM:  GENERAL: NAD, lying in bed comfortably  HEAD:  Atraumatic, normocephalic  EYES: EOMI, PERRLA, conjunctiva and sclera clear  ENT: Moist mucous membranes  NECK: Supple, no JVD  HEART: S1, S2, regular rate and rhythm, no murmurs, rubs, or gallops  LUNGS: Unlabored respirations.  Clear to auscultation bilaterally, no crackles, wheezing, or rhonchi  ABDOMEN: Soft, nontender, nondistended, +BS  EXTREMITIES: 2+ peripheral pulses bilaterally. No clubbing, cyanosis, or edema  SKIN: No rashes or lesions  LINES: peripheral     LABS:                        8.0    9.33  )-----------( 199      ( 14 Dec 2023 00:20 )             23.8     Hgb Trend: 8.0<--, 8.3<--, 8.6<--  12-14    140  |  110<H>  |  30<H>  ----------------------------<  113<H>  4.1   |  14<L>  |  2.61<H>    Ca    8.7      14 Dec 2023 04:30  Phos  3.8     12-14  Mg     2.10     12-14    TPro  6.1  /  Alb  3.4  /  TBili  0.4  /  DBili  x   /  AST  231<H>  /  ALT  123<H>  /  AlkPhos  36<L>  12-14    Creatinine Trend: 2.61<--, 2.62<--, 2.66<--, 2.73<--, 2.84<--, 3.35<--  PT/INR - ( 14 Dec 2023 00:20 )   PT: 12.7 sec;   INR: 1.13 ratio         PTT - ( 13 Dec 2023 12:10 )  PTT:30.2 sec  Urinalysis Basic - ( 14 Dec 2023 04:30 )    Color: x / Appearance: x / SG: x / pH: x  Gluc: 113 mg/dL / Ketone: x  / Bili: x / Urobili: x   Blood: x / Protein: x / Nitrite: x   Leuk Esterase: x / RBC: x / WBC x   Sq Epi: x / Non Sq Epi: x / Bacteria: x      Arterial Blood Gas:  12-14 @ 00:20  7.48/20/170/15/98.8/-7.3  ABG lactate: --  Arterial Blood Gas:  12-13 @ 21:00  7.45/23/139/16/99.0/-6.4  ABG lactate: --  Arterial Blood Gas:  12-13 @ 16:00  7.44/17/208/12/99.3/-9.9  ABG lactate: --  Arterial Blood Gas:  12-13 @ 10:10  7.16/31/429/11/100.0/-16.4  ABG lactate: --    Venous Blood Gas:  12-13 @ 12:10  7.15/34/70/12/90.6  VBG Lactate: 3.5      MICROBIOLOGY:     RADIOLOGY & ADDITIONAL TESTS:     MICU NOTE    CHIEF COMPLAINT: Patient is a 88y old  Male who presents with a chief complaint of transfer for cardiac arrest (14 Dec 2023 08:03)    HPI:  Mr. Campbell is a 87yo man w/ HTN and DM who was transferred from [facility] after cardiac arrest for further care,     Pt is from the , does not recieve medical care in the [place]. He is visiting his daughter. Per his daughter, this morning he had a event of LOC that lasted a few minutes where his daughter found him on the ground, unresponsive, w/ gaze deviation and rigid. She is unsure if there was any seizure, but denies any hx of seizures or neurological issues. He regained consciousness spontaneously and was able to recognize his daughter. He was brought to [facility] where he was found to be afebrile w/ normal BP, and tachycardic w/ AFib wih rates in the 140-150s. While at [facility] pt denied CP, SOB, abd pain, c/d/n/v, LE swelling. While in the ED at [facility] he had an acute episode of reported gaze deviation and unresponsiveness. During the episode he became hypoxic, He then lost a pulse and ACLS was started w/ ROSC after 8min with one round of epi. He was intubated and started on prop and levophed. CTH showed no bleed.     Labs there were notable for a leukocytosis, anemia, Cr 3.3, acidosis, HS-trop I 3300, w/ a ABG showed 7.16 / 31 / 429. CXR was relatively clear w/ no clear consolidation or signs of fluid overload. He was then transferred to [facility] for further cardiac eval. Cardiology here at [facility] had low suspicion for Cardiac etiology for Cardiac arrest , recommended MICU consult. EKG notable for PVCs , no ST elevation / no ST depression.       (13 Dec 2023 14:02)      PAST MEDICAL & SURGICAL HISTORY:  Hypertension      Diabetes      HLD (hyperlipidemia)      H/O appendicitis          FAMILY HISTORY:      SOCIAL HISTORY:  Smoking:   Substance Use:   EtOH Use:   Advance Directives:     MEDICATIONS  (STANDING):  chlorhexidine 0.12% Liquid 15 milliLiter(s) Oral Mucosa every 12 hours  chlorhexidine 2% Cloths 1 Application(s) Topical two times a day  dextrose 5%. 1000 milliLiter(s) (50 mL/Hr) IV Continuous <Continuous>  dextrose 5%. 1000 milliLiter(s) (60 mL/Hr) IV Continuous <Continuous>  dextrose 50% Injectable 25 Gram(s) IV Push once  glucagon  Injectable 1 milliGRAM(s) IntraMuscular once  heparin   Injectable 5000 Unit(s) SubCutaneous every 8 hours  influenza  Vaccine (HIGH DOSE) 0.7 milliLiter(s) IntraMuscular once  insulin regular Infusion 1 Unit(s)/Hr (1 mL/Hr) IV Continuous <Continuous>  norepinephrine Infusion 0.05 MICROgram(s)/kG/Min (5.81 mL/Hr) IV Continuous <Continuous>  pantoprazole  Injectable 40 milliGRAM(s) IV Push daily  petrolatum Ophthalmic Ointment 1 Application(s) Both EYES two times a day  piperacillin/tazobactam IVPB.. 3.375 Gram(s) IV Intermittent every 12 hours  propofol Infusion 30 MICROgram(s)/kG/Min (11.3 mL/Hr) IV Continuous <Continuous>    MEDICATIONS  (PRN):  dextrose Oral Gel 15 Gram(s) Oral once PRN Blood Glucose LESS THAN 70 milliGRAM(s)/deciliter      Allergies    No Known Allergies    Intolerances        REVIEW OF SYSTEMS:  [X] Unable to assess ROS because ________    OBJECTIVE:  ICU Vital Signs Last 24 Hrs  T(C): 37.4 (14 Dec 2023 08:00), Max: 37.9 (14 Dec 2023 04:00)  T(F): 99.3 (14 Dec 2023 08:00), Max: 100.3 (14 Dec 2023 04:00)  HR: 78 (14 Dec 2023 08:00) (76 - 136)  BP: 116/73 (13 Dec 2023 15:15) (67/54 - 190/94)  BP(mean): 85 (13 Dec 2023 15:15) (63 - 120)  ABP: 128/50 (14 Dec 2023 08:00) (105/46 - 140/61)  ABP(mean): 77 (14 Dec 2023 08:00) (68 - 91)  RR: 24 (14 Dec 2023 08:00) (12 - 24)  SpO2: 100% (14 Dec 2023 08:00) (97% - 100%)    O2 Parameters below as of 14 Dec 2023 09:00  Patient On (Oxygen Delivery Method): ventilator    O2 Concentration (%): 30      Mode: AC/ CMV (Assist Control/ Continuous Mandatory Ventilation), RR (machine): 24, TV (machine): 420, FiO2: 30, PEEP: 5, ITime: 0.72, MAP: 10, PIP: 21    12-13 @ 07:01  -  12-14 @ 07:00  --------------------------------------------------------  IN: 1763.9 mL / OUT: 1970 mL / NET: -206.1 mL    12-14 @ 07:01  -  12-14 @ 08:14  --------------------------------------------------------  IN: 181.4 mL / OUT: 75 mL / NET: 106.4 mL      CAPILLARY BLOOD GLUCOSE      POCT Blood Glucose.: 103 mg/dL (14 Dec 2023 07:03)      PHYSICAL EXAM:  GENERAL: NAD, lying in bed comfortably  HEAD:  Atraumatic, normocephalic  EYES: EOMI, PERRLA, conjunctiva and sclera clear  ENT: Moist mucous membranes  NECK: Supple, no JVD  HEART: S1, S2, regular rate and rhythm, no murmurs, rubs, or gallops  LUNGS: Unlabored respirations.  Clear to auscultation bilaterally, no crackles, wheezing, or rhonchi  ABDOMEN: Soft, nontender, nondistended, +BS  EXTREMITIES: 2+ peripheral pulses bilaterally. No clubbing, cyanosis, or edema  SKIN: No rashes or lesions  LINES: peripheral     LABS:                        8.0    9.33  )-----------( 199      ( 14 Dec 2023 00:20 )             23.8     Hgb Trend: 8.0<--, 8.3<--, 8.6<--  12-14    140  |  110<H>  |  30<H>  ----------------------------<  113<H>  4.1   |  14<L>  |  2.61<H>    Ca    8.7      14 Dec 2023 04:30  Phos  3.8     12-14  Mg     2.10     12-14    TPro  6.1  /  Alb  3.4  /  TBili  0.4  /  DBili  x   /  AST  231<H>  /  ALT  123<H>  /  AlkPhos  36<L>  12-14    Creatinine Trend: 2.61<--, 2.62<--, 2.66<--, 2.73<--, 2.84<--, 3.35<--  PT/INR - ( 14 Dec 2023 00:20 )   PT: 12.7 sec;   INR: 1.13 ratio         PTT - ( 13 Dec 2023 12:10 )  PTT:30.2 sec  Urinalysis Basic - ( 14 Dec 2023 04:30 )    Color: x / Appearance: x / SG: x / pH: x  Gluc: 113 mg/dL / Ketone: x  / Bili: x / Urobili: x   Blood: x / Protein: x / Nitrite: x   Leuk Esterase: x / RBC: x / WBC x   Sq Epi: x / Non Sq Epi: x / Bacteria: x      Arterial Blood Gas:  12-14 @ 00:20  7.48/20/170/15/98.8/-7.3  ABG lactate: --  Arterial Blood Gas:  12-13 @ 21:00  7.45/23/139/16/99.0/-6.4  ABG lactate: --  Arterial Blood Gas:  12-13 @ 16:00  7.44/17/208/12/99.3/-9.9  ABG lactate: --  Arterial Blood Gas:  12-13 @ 10:10  7.16/31/429/11/100.0/-16.4  ABG lactate: --    Venous Blood Gas:  12-13 @ 12:10  7.15/34/70/12/90.6  VBG Lactate: 3.5      MICROBIOLOGY:     RADIOLOGY & ADDITIONAL TESTS:     MICU NOTE    CHIEF COMPLAINT: Patient is a 88y old  Male who presents with a chief complaint of transfer for cardiac arrest (14 Dec 2023 08:03)    HPI:  Mr. Campbell is a 89yo man w/ HTN and DM who was transferred from [facility] after cardiac arrest for further care,     Pt is from the , does not recieve medical care in the [place]. He is visiting his daughter. Per his daughter, this morning he had a event of LOC that lasted a few minutes where his daughter found him on the ground, unresponsive, w/ gaze deviation and rigid. She is unsure if there was any seizure, but denies any hx of seizures or neurological issues. He regained consciousness spontaneously and was able to recognize his daughter. He was brought to [facility] where he was found to be afebrile w/ normal BP, and tachycardic w/ AFib With rates in the 140-150s. While at [facility] pt denied CP, SOB, abd pain, c/d/n/v, LE swelling. While in the ED at [facility] he had an acute episode of reported gaze deviation and unresponsiveness. During the episode he became hypoxic, He then lost a pulse and ACLS was started w/ ROSC after 8min with one round of epi. He was intubated and started on prop and levophed. CTH showed no bleed.     Labs there were notable for a leukocytosis, anemia, Cr 3.3, acidosis, HS-trop I 3300, w/ a ABG showed 7.16 / 31 / 429. CXR was relatively clear w/ no clear consolidation or signs of fluid overload. He was then transferred to [facility] for further cardiac eval. Cardiology here at [facility] had low suspicion for Cardiac etiology for Cardiac arrest , recommended MICU consult. EKG notable for PVCs , no ST elevation / no ST depression.       (13 Dec 2023 14:02)      PAST MEDICAL & SURGICAL HISTORY:  Hypertension      Diabetes      HLD (hyperlipidemia)      H/O appendicitis          FAMILY HISTORY:      SOCIAL HISTORY:  Smoking:   Substance Use:   EtOH Use:   Advance Directives:     MEDICATIONS  (STANDING):  chlorhexidine 0.12% Liquid 15 milliLiter(s) Oral Mucosa every 12 hours  chlorhexidine 2% Cloths 1 Application(s) Topical two times a day  dextrose 5%. 1000 milliLiter(s) (50 mL/Hr) IV Continuous <Continuous>  dextrose 5%. 1000 milliLiter(s) (60 mL/Hr) IV Continuous <Continuous>  dextrose 50% Injectable 25 Gram(s) IV Push once  glucagon  Injectable 1 milliGRAM(s) IntraMuscular once  heparin   Injectable 5000 Unit(s) SubCutaneous every 8 hours  influenza  Vaccine (HIGH DOSE) 0.7 milliLiter(s) IntraMuscular once  insulin regular Infusion 1 Unit(s)/Hr (1 mL/Hr) IV Continuous <Continuous>  norepinephrine Infusion 0.05 MICROgram(s)/kG/Min (5.81 mL/Hr) IV Continuous <Continuous>  pantoprazole  Injectable 40 milliGRAM(s) IV Push daily  petrolatum Ophthalmic Ointment 1 Application(s) Both EYES two times a day  piperacillin/tazobactam IVPB.. 3.375 Gram(s) IV Intermittent every 12 hours  propofol Infusion 30 MICROgram(s)/kG/Min (11.3 mL/Hr) IV Continuous <Continuous>    MEDICATIONS  (PRN):  dextrose Oral Gel 15 Gram(s) Oral once PRN Blood Glucose LESS THAN 70 milliGRAM(s)/deciliter      Allergies    No Known Allergies    Intolerances        REVIEW OF SYSTEMS:  [X] Unable to assess ROS because ________    OBJECTIVE:  ICU Vital Signs Last 24 Hrs  T(C): 37.4 (14 Dec 2023 08:00), Max: 37.9 (14 Dec 2023 04:00)  T(F): 99.3 (14 Dec 2023 08:00), Max: 100.3 (14 Dec 2023 04:00)  HR: 78 (14 Dec 2023 08:00) (76 - 136)  BP: 116/73 (13 Dec 2023 15:15) (67/54 - 190/94)  BP(mean): 85 (13 Dec 2023 15:15) (63 - 120)  ABP: 128/50 (14 Dec 2023 08:00) (105/46 - 140/61)  ABP(mean): 77 (14 Dec 2023 08:00) (68 - 91)  RR: 24 (14 Dec 2023 08:00) (12 - 24)  SpO2: 100% (14 Dec 2023 08:00) (97% - 100%)    O2 Parameters below as of 14 Dec 2023 09:00  Patient On (Oxygen Delivery Method): ventilator    O2 Concentration (%): 30      Mode: AC/ CMV (Assist Control/ Continuous Mandatory Ventilation), RR (machine): 24, TV (machine): 420, FiO2: 30, PEEP: 5, ITime: 0.72, MAP: 10, PIP: 21    12-13 @ 07:01  -  12-14 @ 07:00  --------------------------------------------------------  IN: 1763.9 mL / OUT: 1970 mL / NET: -206.1 mL    12-14 @ 07:01  -  12-14 @ 08:14  --------------------------------------------------------  IN: 181.4 mL / OUT: 75 mL / NET: 106.4 mL      CAPILLARY BLOOD GLUCOSE      POCT Blood Glucose.: 103 mg/dL (14 Dec 2023 07:03)      PHYSICAL EXAM:  GENERAL: NAD, lying in bed comfortably  HEAD:  Atraumatic, normocephalic  EYES: EOMI, PERRLA, conjunctiva and sclera clear  ENT: Moist mucous membranes  NECK: Supple, no JVD  HEART: S1, S2, regular rate and rhythm, no murmurs, rubs, or gallops  LUNGS: Unlabored respirations.  Clear to auscultation bilaterally, no crackles, wheezing, or rhonchi  ABDOMEN: Soft, nontender, nondistended, +BS  EXTREMITIES: 2+ peripheral pulses bilaterally. No clubbing, cyanosis, or edema  SKIN: No rashes or lesions  LINES: peripheral     LABS:                        8.0    9.33  )-----------( 199      ( 14 Dec 2023 00:20 )             23.8     Hgb Trend: 8.0<--, 8.3<--, 8.6<--  12-14    140  |  110<H>  |  30<H>  ----------------------------<  113<H>  4.1   |  14<L>  |  2.61<H>    Ca    8.7      14 Dec 2023 04:30  Phos  3.8     12-14  Mg     2.10     12-14    TPro  6.1  /  Alb  3.4  /  TBili  0.4  /  DBili  x   /  AST  231<H>  /  ALT  123<H>  /  AlkPhos  36<L>  12-14    Creatinine Trend: 2.61<--, 2.62<--, 2.66<--, 2.73<--, 2.84<--, 3.35<--  PT/INR - ( 14 Dec 2023 00:20 )   PT: 12.7 sec;   INR: 1.13 ratio         PTT - ( 13 Dec 2023 12:10 )  PTT:30.2 sec  Urinalysis Basic - ( 14 Dec 2023 04:30 )    Color: x / Appearance: x / SG: x / pH: x  Gluc: 113 mg/dL / Ketone: x  / Bili: x / Urobili: x   Blood: x / Protein: x / Nitrite: x   Leuk Esterase: x / RBC: x / WBC x   Sq Epi: x / Non Sq Epi: x / Bacteria: x      Arterial Blood Gas:  12-14 @ 00:20  7.48/20/170/15/98.8/-7.3  ABG lactate: --  Arterial Blood Gas:  12-13 @ 21:00  7.45/23/139/16/99.0/-6.4  ABG lactate: --  Arterial Blood Gas:  12-13 @ 16:00  7.44/17/208/12/99.3/-9.9  ABG lactate: --  Arterial Blood Gas:  12-13 @ 10:10  7.16/31/429/11/100.0/-16.4  ABG lactate: --    Venous Blood Gas:  12-13 @ 12:10  7.15/34/70/12/90.6  VBG Lactate: 3.5      MICROBIOLOGY:     RADIOLOGY & ADDITIONAL TESTS:     MICU NOTE    CHIEF COMPLAINT: Patient is a 88y old  Male who presents with a chief complaint of transfer for cardiac arrest (14 Dec 2023 08:03)    HPI:  Mr. Campbell is a 89yo man w/ HTN and DM who was transferred from [facility] after cardiac arrest for further care,     Pt is from the , does not recieve medical care in the [place]. He is visiting his daughter. Per his daughter, this morning he had a event of LOC that lasted a few minutes where his daughter found him on the ground, unresponsive, w/ gaze deviation and rigid. She is unsure if there was any seizure, but denies any hx of seizures or neurological issues. He regained consciousness spontaneously and was able to recognize his daughter. He was brought to [facility] where he was found to be afebrile w/ normal BP, and tachycardic w/ AFib With rates in the 140-150s. While at [facility] pt denied CP, SOB, abd pain, c/d/n/v, LE swelling. While in the ED at [facility] he had an acute episode of reported gaze deviation and unresponsiveness. During the episode he became hypoxic, He then lost a pulse and ACLS was started w/ ROSC after 8min with one round of epi. He was intubated and started on prop and levophed. CTH showed no bleed.     Labs there were notable for a leukocytosis, anemia, Cr 3.3, acidosis, HS-trop I 3300, w/ a ABG showed 7.16 / 31 / 429. CXR was relatively clear w/ no clear consolidation or signs of fluid overload. He was then transferred to [facility] for further cardiac eval. Cardiology here at [facility] had low suspicion for Cardiac etiology for Cardiac arrest , recommended MICU consult. EKG notable for PVCs , no ST elevation / no ST depression.       (13 Dec 2023 14:02)      PAST MEDICAL & SURGICAL HISTORY:  Hypertension      Diabetes      HLD (hyperlipidemia)      H/O appendicitis          FAMILY HISTORY:      SOCIAL HISTORY:  Smoking:   Substance Use:   EtOH Use:   Advance Directives:     MEDICATIONS  (STANDING):  chlorhexidine 0.12% Liquid 15 milliLiter(s) Oral Mucosa every 12 hours  chlorhexidine 2% Cloths 1 Application(s) Topical two times a day  dextrose 5%. 1000 milliLiter(s) (50 mL/Hr) IV Continuous <Continuous>  dextrose 5%. 1000 milliLiter(s) (60 mL/Hr) IV Continuous <Continuous>  dextrose 50% Injectable 25 Gram(s) IV Push once  glucagon  Injectable 1 milliGRAM(s) IntraMuscular once  heparin   Injectable 5000 Unit(s) SubCutaneous every 8 hours  influenza  Vaccine (HIGH DOSE) 0.7 milliLiter(s) IntraMuscular once  insulin regular Infusion 1 Unit(s)/Hr (1 mL/Hr) IV Continuous <Continuous>  norepinephrine Infusion 0.05 MICROgram(s)/kG/Min (5.81 mL/Hr) IV Continuous <Continuous>  pantoprazole  Injectable 40 milliGRAM(s) IV Push daily  petrolatum Ophthalmic Ointment 1 Application(s) Both EYES two times a day  piperacillin/tazobactam IVPB.. 3.375 Gram(s) IV Intermittent every 12 hours  propofol Infusion 30 MICROgram(s)/kG/Min (11.3 mL/Hr) IV Continuous <Continuous>    MEDICATIONS  (PRN):  dextrose Oral Gel 15 Gram(s) Oral once PRN Blood Glucose LESS THAN 70 milliGRAM(s)/deciliter      Allergies    No Known Allergies    Intolerances        REVIEW OF SYSTEMS:  [X] Unable to assess ROS because ________    OBJECTIVE:  ICU Vital Signs Last 24 Hrs  T(C): 37.4 (14 Dec 2023 08:00), Max: 37.9 (14 Dec 2023 04:00)  T(F): 99.3 (14 Dec 2023 08:00), Max: 100.3 (14 Dec 2023 04:00)  HR: 78 (14 Dec 2023 08:00) (76 - 136)  BP: 116/73 (13 Dec 2023 15:15) (67/54 - 190/94)  BP(mean): 85 (13 Dec 2023 15:15) (63 - 120)  ABP: 128/50 (14 Dec 2023 08:00) (105/46 - 140/61)  ABP(mean): 77 (14 Dec 2023 08:00) (68 - 91)  RR: 24 (14 Dec 2023 08:00) (12 - 24)  SpO2: 100% (14 Dec 2023 08:00) (97% - 100%)    O2 Parameters below as of 14 Dec 2023 09:00  Patient On (Oxygen Delivery Method): ventilator    O2 Concentration (%): 30      Mode: AC/ CMV (Assist Control/ Continuous Mandatory Ventilation), RR (machine): 24, TV (machine): 420, FiO2: 30, PEEP: 5, ITime: 0.72, MAP: 10, PIP: 21    12-13 @ 07:01  -  12-14 @ 07:00  --------------------------------------------------------  IN: 1763.9 mL / OUT: 1970 mL / NET: -206.1 mL    12-14 @ 07:01  -  12-14 @ 08:14  --------------------------------------------------------  IN: 181.4 mL / OUT: 75 mL / NET: 106.4 mL      CAPILLARY BLOOD GLUCOSE      POCT Blood Glucose.: 103 mg/dL (14 Dec 2023 07:03)      PHYSICAL EXAM:  GENERAL: NAD, lying in bed comfortably  HEAD:  Atraumatic, normocephalic  EYES: EOMI, PERRLA, conjunctiva and sclera clear  ENT: Moist mucous membranes  NECK: Supple, no JVD  HEART: S1, S2, regular rate and rhythm, no murmurs, rubs, or gallops  LUNGS: Unlabored respirations.  Clear to auscultation bilaterally, no crackles, wheezing, or rhonchi  ABDOMEN: Soft, nontender, nondistended, +BS  EXTREMITIES: 2+ peripheral pulses bilaterally. No clubbing, cyanosis, or edema  SKIN: No rashes or lesions  LINES: peripheral     LABS:                        8.0    9.33  )-----------( 199      ( 14 Dec 2023 00:20 )             23.8     Hgb Trend: 8.0<--, 8.3<--, 8.6<--  12-14    140  |  110<H>  |  30<H>  ----------------------------<  113<H>  4.1   |  14<L>  |  2.61<H>    Ca    8.7      14 Dec 2023 04:30  Phos  3.8     12-14  Mg     2.10     12-14    TPro  6.1  /  Alb  3.4  /  TBili  0.4  /  DBili  x   /  AST  231<H>  /  ALT  123<H>  /  AlkPhos  36<L>  12-14    Creatinine Trend: 2.61<--, 2.62<--, 2.66<--, 2.73<--, 2.84<--, 3.35<--  PT/INR - ( 14 Dec 2023 00:20 )   PT: 12.7 sec;   INR: 1.13 ratio         PTT - ( 13 Dec 2023 12:10 )  PTT:30.2 sec  Urinalysis Basic - ( 14 Dec 2023 04:30 )    Color: x / Appearance: x / SG: x / pH: x  Gluc: 113 mg/dL / Ketone: x  / Bili: x / Urobili: x   Blood: x / Protein: x / Nitrite: x   Leuk Esterase: x / RBC: x / WBC x   Sq Epi: x / Non Sq Epi: x / Bacteria: x      Arterial Blood Gas:  12-14 @ 00:20  7.48/20/170/15/98.8/-7.3  ABG lactate: --  Arterial Blood Gas:  12-13 @ 21:00  7.45/23/139/16/99.0/-6.4  ABG lactate: --  Arterial Blood Gas:  12-13 @ 16:00  7.44/17/208/12/99.3/-9.9  ABG lactate: --  Arterial Blood Gas:  12-13 @ 10:10  7.16/31/429/11/100.0/-16.4  ABG lactate: --    Venous Blood Gas:  12-13 @ 12:10  7.15/34/70/12/90.6  VBG Lactate: 3.5      MICROBIOLOGY:     RADIOLOGY & ADDITIONAL TESTS:

## 2023-12-14 NOTE — DIETITIAN INITIAL EVALUATION ADULT - OTHER INFO
Spoke w RN and pharmD.  Met with daughter @ bedside.  Pt. remains intubated/sedated on Propofol. vEEG monitoring in place.  Pt. being transitioned to Lantus.    Tube feeding just ordered... discussed w RN nutrition suggestion for Glucerna 1.5 as Nepro not warranted.  Pt. reported by daughter to self monitor blood glucose occasionally, but inconsistently.  No reported hypoglycemic episodes at home.   When asked, daughter estimates Pt. with ~20lb weight loss over past 1 year. Attributed this to her father aging. Is uncertain of Pt.'s usual body weight.

## 2023-12-14 NOTE — PROGRESS NOTE ADULT - SUBJECTIVE AND OBJECTIVE BOX
Cardiology Progress Note  ------------------------------------------------------------------------------------------    SUBJECTIVE/EVENTS:  - NAEO    -------------------------------------------------------------------------------------------  ROS:  Intubated / sedated    All other review of systems is negative unless indicated above.   -------------------------------------------------------------------------------------------  VITALS:  T(F): 99.3 (12-14), Max: 100.3 (12-14)  HR: 78 (12-14) (76 - 136)  BP: 116/73 (12-13) (67/54 - 190/94)  RR: 24 (12-14)  SpO2: 100% (12-14)  I&O's Summary    13 Dec 2023 07:01  -  14 Dec 2023 07:00  --------------------------------------------------------  IN: 1763.9 mL / OUT: 1970 mL / NET: -206.1 mL    14 Dec 2023 07:01  -  14 Dec 2023 08:04  --------------------------------------------------------  IN: 90.7 mL / OUT: 0 mL / NET: 90.7 mL      ------------------------------------------------------------------------------------------  PHYSICAL EXAM:  GEN: Intubated, sedated  HEENT: NCAT, EOMI  CV: RRR, Ns1/s2, no m/r/g, JVP not elevated  RESP: mechanical breath sounds b/l, no clear rales  ABD: soft, nt, nd  EXT: warm, 2+ pulses, no edema  SKIN: no visible lesions, rashes  NEURO: sedated  PSYCH: sedated    -------------------------------------------------------------------------------------------  LABS:                          8.0    9.33  )-----------( 199      ( 14 Dec 2023 00:20 )             23.8     12-14    140  |  110<H>  |  30<H>  ----------------------------<  113<H>  4.1   |  14<L>  |  2.61<H>    Ca    8.7      14 Dec 2023 04:30  Phos  3.8     12-14  Mg     2.10     12-14    TPro  6.1  /  Alb  3.4  /  TBili  0.4  /  DBili  x   /  AST  231<H>  /  ALT  123<H>  /  AlkPhos  36<L>  12-14    PT/INR - ( 14 Dec 2023 00:20 )   PT: 12.7 sec;   INR: 1.13 ratio         PTT - ( 13 Dec 2023 12:10 )  PTT:30.2 sec  CARDIAC MARKERS ( 14 Dec 2023 00:20 )  1873 ng/L / x     / x     / x     / x     / 25.5 ng/mL  CARDIAC MARKERS ( 13 Dec 2023 16:00 )  1763 ng/L / x     / x     / x     / x     / x      CARDIAC MARKERS ( 13 Dec 2023 12:10 )  1116 ng/L / x     / x     / 682 U/L / x     / 33.3 ng/mL  CARDIAC MARKERS ( 13 Dec 2023 07:50 )  x     / x     / x     / 308 U/L / x     / 12.1 ng/mL            -------------------------------------------------------------------------------------------  Meds:  chlorhexidine 0.12% Liquid 15 milliLiter(s) Oral Mucosa every 12 hours  chlorhexidine 2% Cloths 1 Application(s) Topical two times a day  dextrose 5%. 1000 milliLiter(s) IV Continuous <Continuous>  dextrose 5%. 1000 milliLiter(s) IV Continuous <Continuous>  dextrose 50% Injectable 25 Gram(s) IV Push once  dextrose Oral Gel 15 Gram(s) Oral once PRN  glucagon  Injectable 1 milliGRAM(s) IntraMuscular once  heparin   Injectable 5000 Unit(s) SubCutaneous every 8 hours  influenza  Vaccine (HIGH DOSE) 0.7 milliLiter(s) IntraMuscular once  insulin regular Infusion 1 Unit(s)/Hr IV Continuous <Continuous>  norepinephrine Infusion 0.05 MICROgram(s)/kG/Min IV Continuous <Continuous>  pantoprazole  Injectable 40 milliGRAM(s) IV Push daily  petrolatum Ophthalmic Ointment 1 Application(s) Both EYES two times a day  piperacillin/tazobactam IVPB.. 3.375 Gram(s) IV Intermittent every 12 hours  propofol Infusion 30 MICROgram(s)/kG/Min IV Continuous <Continuous>    -------------------------------------------------------------------------------------------  Cardiovascular Diagnostic Testing:      -------------------------------------------------------------------------------------------  Assessment and Plan:   Mr. Campbell is a 89yo man w/ HTN and DM who presented to [facility] after episode of LOC found to be in AFib w/ RVR w/ anterolateral STDs c/b another bradycardic/PEA arrest w/ ROSC now transferred to [facility] found to have elevated trop, ALEXI, leukocytosis w/ EKG now showing sinus tach w/o ischemic changes, w/ AG metabolic acidosis w/ elevated BHB c/f euglycemic DKA admitted to the MICU for further care. Cardiology is consulted iso STDs seen during AFib w/ RVR and troponin elevation.    #Cardiac Arrest  #AFib w/ RVR  #Troponin elevation 2/2 to type 2 NSTEMI  #HTN  Unclear [id number] - at least 3 for age and HTN, unclear if hx of DM. Pt w/ unclear hx, however per pts meds appears to have HTN and possibly DM. Pt w/ LOC episode, unclear if syncope or if seizure or other neurological process. Presented to [facility] w/o CP but in AFib w/ RVR in the 140s w/ anterolateral STDs, trop was elevated. Pt then had another event of LOC/syncope and then became bradycardic w/ a PEA arrest per tele strip, s/p ACLS w/ ROSC requiring intubation. Post-ROSC EKG w/ sinus tach w/ and non-ischemic w/ resolution of STDs. Overall etiology of LOC episodes and arrest is unclear, however low suspicion that this is primary ACS. No VT/VF or ANNETTE to suggest ACS as underlying process of arrest. Pt likely does have underlying CAD, and during rapid AFib event likely had mild ischemia, 2/2 to demand iso rapid HRs, leading to trop elevation, but ST changes have normalized and pt was reportedly w/o CP/sx at the time. Possible pt had a vagal episode leading to syncope / bradycardia.     Overall low suspicion for active ACS, no signs of cardiogenic shock (pt not appearing overloaded and is warm on exam), and no current unstable arrhythmias. Arrest more likely 2/2 to bradycardia iso severe metabolic acidosis.     Recommendations:  - continue holding AC for now as pt remains in NSR, will re-discuss once more stabilized and out of the ICU  - trend trop to peak  - please get lipids, TSH, A1c  - f/up formal TTE, report of AS seen on POCUS done by MICU team  - pt likely will need ischemic eval given diffuse STDs seen during AFib w/ RVR, however can be done once more stabilized and out of the ICU    *** Recommendations are preliminary until cosigned by the attending.    Shashi Wells MD  Cardiology Fellow    For all New Consults and Questions:  [email or web address]   Login: [email or web address] Cardiology Progress Note  ------------------------------------------------------------------------------------------    SUBJECTIVE/EVENTS:  - NAEO    -------------------------------------------------------------------------------------------  ROS:  Intubated / sedated    All other review of systems is negative unless indicated above.   -------------------------------------------------------------------------------------------  VITALS:  T(F): 99.3 (12-14), Max: 100.3 (12-14)  HR: 78 (12-14) (76 - 136)  BP: 116/73 (12-13) (67/54 - 190/94)  RR: 24 (12-14)  SpO2: 100% (12-14)  I&O's Summary    13 Dec 2023 07:01  -  14 Dec 2023 07:00  --------------------------------------------------------  IN: 1763.9 mL / OUT: 1970 mL / NET: -206.1 mL    14 Dec 2023 07:01  -  14 Dec 2023 08:04  --------------------------------------------------------  IN: 90.7 mL / OUT: 0 mL / NET: 90.7 mL      ------------------------------------------------------------------------------------------  PHYSICAL EXAM:  GEN: Intubated, sedated  HEENT: NCAT, EOMI  CV: RRR, Ns1/s2, no m/r/g, JVP not elevated  RESP: mechanical breath sounds b/l, no clear rales  ABD: soft, nt, nd  EXT: warm, 2+ pulses, no edema  SKIN: no visible lesions, rashes  NEURO: sedated  PSYCH: sedated    -------------------------------------------------------------------------------------------  LABS:                          8.0    9.33  )-----------( 199      ( 14 Dec 2023 00:20 )             23.8     12-14    140  |  110<H>  |  30<H>  ----------------------------<  113<H>  4.1   |  14<L>  |  2.61<H>    Ca    8.7      14 Dec 2023 04:30  Phos  3.8     12-14  Mg     2.10     12-14    TPro  6.1  /  Alb  3.4  /  TBili  0.4  /  DBili  x   /  AST  231<H>  /  ALT  123<H>  /  AlkPhos  36<L>  12-14    PT/INR - ( 14 Dec 2023 00:20 )   PT: 12.7 sec;   INR: 1.13 ratio         PTT - ( 13 Dec 2023 12:10 )  PTT:30.2 sec  CARDIAC MARKERS ( 14 Dec 2023 00:20 )  1873 ng/L / x     / x     / x     / x     / 25.5 ng/mL  CARDIAC MARKERS ( 13 Dec 2023 16:00 )  1763 ng/L / x     / x     / x     / x     / x      CARDIAC MARKERS ( 13 Dec 2023 12:10 )  1116 ng/L / x     / x     / 682 U/L / x     / 33.3 ng/mL  CARDIAC MARKERS ( 13 Dec 2023 07:50 )  x     / x     / x     / 308 U/L / x     / 12.1 ng/mL            -------------------------------------------------------------------------------------------  Meds:  chlorhexidine 0.12% Liquid 15 milliLiter(s) Oral Mucosa every 12 hours  chlorhexidine 2% Cloths 1 Application(s) Topical two times a day  dextrose 5%. 1000 milliLiter(s) IV Continuous <Continuous>  dextrose 5%. 1000 milliLiter(s) IV Continuous <Continuous>  dextrose 50% Injectable 25 Gram(s) IV Push once  dextrose Oral Gel 15 Gram(s) Oral once PRN  glucagon  Injectable 1 milliGRAM(s) IntraMuscular once  heparin   Injectable 5000 Unit(s) SubCutaneous every 8 hours  influenza  Vaccine (HIGH DOSE) 0.7 milliLiter(s) IntraMuscular once  insulin regular Infusion 1 Unit(s)/Hr IV Continuous <Continuous>  norepinephrine Infusion 0.05 MICROgram(s)/kG/Min IV Continuous <Continuous>  pantoprazole  Injectable 40 milliGRAM(s) IV Push daily  petrolatum Ophthalmic Ointment 1 Application(s) Both EYES two times a day  piperacillin/tazobactam IVPB.. 3.375 Gram(s) IV Intermittent every 12 hours  propofol Infusion 30 MICROgram(s)/kG/Min IV Continuous <Continuous>    -------------------------------------------------------------------------------------------  Cardiovascular Diagnostic Testing:      -------------------------------------------------------------------------------------------  Assessment and Plan:   Mr. Campbell is a 89yo man w/ HTN and DM who presented to [facility] after episode of LOC found to be in AFib w/ RVR w/ anterolateral STDs c/b another bradycardic/PEA arrest w/ ROSC now transferred to [facility] found to have elevated trop, ALEXI, leukocytosis w/ EKG now showing sinus tach w/o ischemic changes, w/ AG metabolic acidosis w/ elevated BHB c/f euglycemic DKA admitted to the MICU for further care. Cardiology is consulted iso STDs seen during AFib w/ RVR and troponin elevation.    #Cardiac Arrest  #AFib w/ RVR  #Troponin elevation 2/2 to type 2 NSTEMI  #HTN  Unclear [id number] - at least 3 for age and HTN, unclear if hx of DM. Pt w/ unclear hx, however per pts meds appears to have HTN and possibly DM. Pt w/ LOC episode, unclear if syncope or if seizure or other neurological process. Presented to [facility] w/o CP but in AFib w/ RVR in the 140s w/ anterolateral STDs, trop was elevated. Pt then had another event of LOC/syncope and then became bradycardic w/ a PEA arrest per tele strip, s/p ACLS w/ ROSC requiring intubation. Post-ROSC EKG w/ sinus tach w/ and non-ischemic w/ resolution of STDs. Overall etiology of LOC episodes and arrest is unclear, however low suspicion that this is primary ACS. No VT/VF or ANNETTE to suggest ACS as underlying process of arrest. Pt likely does have underlying CAD, and during rapid AFib event likely had mild ischemia, 2/2 to demand iso rapid HRs, leading to trop elevation, but ST changes have normalized and pt was reportedly w/o CP/sx at the time. Possible pt had a vagal episode leading to syncope / bradycardia.     Overall low suspicion for active ACS, no signs of cardiogenic shock (pt not appearing overloaded and is warm on exam), and no current unstable arrhythmias. Arrest more likely 2/2 to bradycardia iso severe metabolic acidosis.     Recommendations:  - continue holding AC for now as pt remains in NSR, will re-discuss once more stabilized and out of the ICU  - trend trop to peak  - please get lipids, TSH, A1c  - f/up formal TTE, report of AS seen on POCUS done by MICU team  - pt likely will need ischemic eval given diffuse STDs seen during AFib w/ RVR, however can be done once more stabilized and out of the ICU    *** Recommendations are preliminary until cosigned by the attending.    Shashi Wells MD  Cardiology Fellow    For all New Consults and Questions:  [email or web address]   Login: [email or web address] Cardiology Progress Note  ------------------------------------------------------------------------------------------    SUBJECTIVE/EVENTS:  - NAEO    -------------------------------------------------------------------------------------------  ROS:  Intubated / sedated    All other review of systems is negative unless indicated above.   -------------------------------------------------------------------------------------------  VITALS:  T(F): 99.3 (12-14), Max: 100.3 (12-14)  HR: 78 (12-14) (76 - 136)  BP: 116/73 (12-13) (67/54 - 190/94)  RR: 24 (12-14)  SpO2: 100% (12-14)  I&O's Summary    13 Dec 2023 07:01  -  14 Dec 2023 07:00  --------------------------------------------------------  IN: 1763.9 mL / OUT: 1970 mL / NET: -206.1 mL    14 Dec 2023 07:01  -  14 Dec 2023 08:04  --------------------------------------------------------  IN: 90.7 mL / OUT: 0 mL / NET: 90.7 mL      ------------------------------------------------------------------------------------------  PHYSICAL EXAM:  GEN: Intubated, sedated  HEENT: NCAT, EOMI  CV: RRR, Ns1/s2, no m/r/g, JVP not elevated  RESP: mechanical breath sounds b/l, no clear rales  ABD: soft, nt, nd  EXT: warm, 2+ pulses, no edema  SKIN: no visible lesions, rashes  NEURO: sedated  PSYCH: sedated    -------------------------------------------------------------------------------------------  LABS:                          8.0    9.33  )-----------( 199      ( 14 Dec 2023 00:20 )             23.8     12-14    140  |  110<H>  |  30<H>  ----------------------------<  113<H>  4.1   |  14<L>  |  2.61<H>    Ca    8.7      14 Dec 2023 04:30  Phos  3.8     12-14  Mg     2.10     12-14    TPro  6.1  /  Alb  3.4  /  TBili  0.4  /  DBili  x   /  AST  231<H>  /  ALT  123<H>  /  AlkPhos  36<L>  12-14    PT/INR - ( 14 Dec 2023 00:20 )   PT: 12.7 sec;   INR: 1.13 ratio         PTT - ( 13 Dec 2023 12:10 )  PTT:30.2 sec  CARDIAC MARKERS ( 14 Dec 2023 00:20 )  1873 ng/L / x     / x     / x     / x     / 25.5 ng/mL  CARDIAC MARKERS ( 13 Dec 2023 16:00 )  1763 ng/L / x     / x     / x     / x     / x      CARDIAC MARKERS ( 13 Dec 2023 12:10 )  1116 ng/L / x     / x     / 682 U/L / x     / 33.3 ng/mL  CARDIAC MARKERS ( 13 Dec 2023 07:50 )  x     / x     / x     / 308 U/L / x     / 12.1 ng/mL            -------------------------------------------------------------------------------------------  Meds:  chlorhexidine 0.12% Liquid 15 milliLiter(s) Oral Mucosa every 12 hours  chlorhexidine 2% Cloths 1 Application(s) Topical two times a day  dextrose 5%. 1000 milliLiter(s) IV Continuous <Continuous>  dextrose 5%. 1000 milliLiter(s) IV Continuous <Continuous>  dextrose 50% Injectable 25 Gram(s) IV Push once  dextrose Oral Gel 15 Gram(s) Oral once PRN  glucagon  Injectable 1 milliGRAM(s) IntraMuscular once  heparin   Injectable 5000 Unit(s) SubCutaneous every 8 hours  influenza  Vaccine (HIGH DOSE) 0.7 milliLiter(s) IntraMuscular once  insulin regular Infusion 1 Unit(s)/Hr IV Continuous <Continuous>  norepinephrine Infusion 0.05 MICROgram(s)/kG/Min IV Continuous <Continuous>  pantoprazole  Injectable 40 milliGRAM(s) IV Push daily  petrolatum Ophthalmic Ointment 1 Application(s) Both EYES two times a day  piperacillin/tazobactam IVPB.. 3.375 Gram(s) IV Intermittent every 12 hours  propofol Infusion 30 MICROgram(s)/kG/Min IV Continuous <Continuous>    -------------------------------------------------------------------------------------------  Cardiovascular Diagnostic Testing:      -------------------------------------------------------------------------------------------  Assessment and Plan:   Mr. Campbell is a 87yo man w/ HTN and DM who presented to [facility] after episode of LOC found to be in AFib w/ RVR w/ anterolateral STDs c/b another bradycardic/PEA arrest w/ ROSC now transferred to [facility] found to have elevated trop, ALEXI, leukocytosis w/ EKG now showing sinus tach w/o ischemic changes, w/ AG metabolic acidosis w/ elevated BHB c/f euglycemic DKA admitted to the MICU for further care. Cardiology is consulted iso STDs seen during AFib w/ RVR and troponin elevation.    #Cardiac Arrest  #AFib w/ RVR  #Troponin elevation 2/2 to type 2 NSTEMI  #HTN  Unclear [id number] - at least 3 for age and HTN, unclear if hx of DM. Pt w/ unclear hx, however per pts meds appears to have HTN and possibly DM. Pt w/ LOC episode, unclear if syncope or if seizure or other neurological process. Presented to [facility] w/o CP but in AFib w/ RVR in the 140s w/ anterolateral STDs, trop was elevated. Pt then had another event of LOC/syncope and then became bradycardic w/ a PEA arrest per tele strip, s/p ACLS w/ ROSC requiring intubation. Post-ROSC EKG w/ sinus tach w/ and non-ischemic w/ resolution of STDs. Overall etiology of LOC episodes and arrest is unclear, however low suspicion that this is primary ACS. No VT/VF or ANNETTE to suggest ACS as underlying process of arrest. Pt likely does have underlying CAD, and during rapid AFib event likely had mild ischemia, 2/2 to demand iso rapid HRs, leading to trop elevation, but ST changes have normalized and pt was reportedly w/o CP/sx at the time. Possible pt had a vagal episode leading to syncope / bradycardia.     Overall low suspicion for active ACS, no signs of cardiogenic shock (pt not appearing overloaded and is warm on exam), and no current unstable arrhythmias. Arrest more likely 2/2 to bradycardia iso severe metabolic acidosis.     Recommendations:  - continue holding AC for now as pt remains in NSR, will re-discuss once more stabilized and out of the ICU  - trend trop to peak  - please get lipids, TSH, A1c  - f/up formal TTE, report of AS seen on POCUS done by MICU team  - pt likely will need ischemic eval given diffuse STDs seen during AFib w/ RVR, however can be done once more stabilized and out of the ICU    Cardiology will sign off for now, please reach out to our team once the pt is extubated, more stabilized and out of the ICU for ischemic evaluation planning.    *** Recommendations are preliminary until cosigned by the attending.    Shashi Wells MD  Cardiology Fellow    For all New Consults and Questions:  [email or web address]   Login: [email or web address] Cardiology Progress Note  ------------------------------------------------------------------------------------------    SUBJECTIVE/EVENTS:  - NAEO    -------------------------------------------------------------------------------------------  ROS:  Intubated / sedated    All other review of systems is negative unless indicated above.   -------------------------------------------------------------------------------------------  VITALS:  T(F): 99.3 (12-14), Max: 100.3 (12-14)  HR: 78 (12-14) (76 - 136)  BP: 116/73 (12-13) (67/54 - 190/94)  RR: 24 (12-14)  SpO2: 100% (12-14)  I&O's Summary    13 Dec 2023 07:01  -  14 Dec 2023 07:00  --------------------------------------------------------  IN: 1763.9 mL / OUT: 1970 mL / NET: -206.1 mL    14 Dec 2023 07:01  -  14 Dec 2023 08:04  --------------------------------------------------------  IN: 90.7 mL / OUT: 0 mL / NET: 90.7 mL      ------------------------------------------------------------------------------------------  PHYSICAL EXAM:  GEN: Intubated, sedated  HEENT: NCAT, EOMI  CV: RRR, Ns1/s2, no m/r/g, JVP not elevated  RESP: mechanical breath sounds b/l, no clear rales  ABD: soft, nt, nd  EXT: warm, 2+ pulses, no edema  SKIN: no visible lesions, rashes  NEURO: sedated  PSYCH: sedated    -------------------------------------------------------------------------------------------  LABS:                          8.0    9.33  )-----------( 199      ( 14 Dec 2023 00:20 )             23.8     12-14    140  |  110<H>  |  30<H>  ----------------------------<  113<H>  4.1   |  14<L>  |  2.61<H>    Ca    8.7      14 Dec 2023 04:30  Phos  3.8     12-14  Mg     2.10     12-14    TPro  6.1  /  Alb  3.4  /  TBili  0.4  /  DBili  x   /  AST  231<H>  /  ALT  123<H>  /  AlkPhos  36<L>  12-14    PT/INR - ( 14 Dec 2023 00:20 )   PT: 12.7 sec;   INR: 1.13 ratio         PTT - ( 13 Dec 2023 12:10 )  PTT:30.2 sec  CARDIAC MARKERS ( 14 Dec 2023 00:20 )  1873 ng/L / x     / x     / x     / x     / 25.5 ng/mL  CARDIAC MARKERS ( 13 Dec 2023 16:00 )  1763 ng/L / x     / x     / x     / x     / x      CARDIAC MARKERS ( 13 Dec 2023 12:10 )  1116 ng/L / x     / x     / 682 U/L / x     / 33.3 ng/mL  CARDIAC MARKERS ( 13 Dec 2023 07:50 )  x     / x     / x     / 308 U/L / x     / 12.1 ng/mL            -------------------------------------------------------------------------------------------  Meds:  chlorhexidine 0.12% Liquid 15 milliLiter(s) Oral Mucosa every 12 hours  chlorhexidine 2% Cloths 1 Application(s) Topical two times a day  dextrose 5%. 1000 milliLiter(s) IV Continuous <Continuous>  dextrose 5%. 1000 milliLiter(s) IV Continuous <Continuous>  dextrose 50% Injectable 25 Gram(s) IV Push once  dextrose Oral Gel 15 Gram(s) Oral once PRN  glucagon  Injectable 1 milliGRAM(s) IntraMuscular once  heparin   Injectable 5000 Unit(s) SubCutaneous every 8 hours  influenza  Vaccine (HIGH DOSE) 0.7 milliLiter(s) IntraMuscular once  insulin regular Infusion 1 Unit(s)/Hr IV Continuous <Continuous>  norepinephrine Infusion 0.05 MICROgram(s)/kG/Min IV Continuous <Continuous>  pantoprazole  Injectable 40 milliGRAM(s) IV Push daily  petrolatum Ophthalmic Ointment 1 Application(s) Both EYES two times a day  piperacillin/tazobactam IVPB.. 3.375 Gram(s) IV Intermittent every 12 hours  propofol Infusion 30 MICROgram(s)/kG/Min IV Continuous <Continuous>    -------------------------------------------------------------------------------------------  Cardiovascular Diagnostic Testing:      -------------------------------------------------------------------------------------------  Assessment and Plan:   Mr. Campbell is a 87yo man w/ HTN and DM who presented to [facility] after episode of LOC found to be in AFib w/ RVR w/ anterolateral STDs c/b another bradycardic/PEA arrest w/ ROSC now transferred to [facility] found to have elevated trop, ALEXI, leukocytosis w/ EKG now showing sinus tach w/o ischemic changes, w/ AG metabolic acidosis w/ elevated BHB c/f euglycemic DKA admitted to the MICU for further care. Cardiology is consulted iso STDs seen during AFib w/ RVR and troponin elevation.    #Cardiac Arrest  #AFib w/ RVR  #Troponin elevation 2/2 to type 2 NSTEMI  #HTN  Unclear [id number] - at least 3 for age and HTN, unclear if hx of DM. Pt w/ unclear hx, however per pts meds appears to have HTN and possibly DM. Pt w/ LOC episode, unclear if syncope or if seizure or other neurological process. Presented to [facility] w/o CP but in AFib w/ RVR in the 140s w/ anterolateral STDs, trop was elevated. Pt then had another event of LOC/syncope and then became bradycardic w/ a PEA arrest per tele strip, s/p ACLS w/ ROSC requiring intubation. Post-ROSC EKG w/ sinus tach w/ and non-ischemic w/ resolution of STDs. Overall etiology of LOC episodes and arrest is unclear, however low suspicion that this is primary ACS. No VT/VF or ANNETTE to suggest ACS as underlying process of arrest. Pt likely does have underlying CAD, and during rapid AFib event likely had mild ischemia, 2/2 to demand iso rapid HRs, leading to trop elevation, but ST changes have normalized and pt was reportedly w/o CP/sx at the time. Possible pt had a vagal episode leading to syncope / bradycardia.     Overall low suspicion for active ACS, no signs of cardiogenic shock (pt not appearing overloaded and is warm on exam), and no current unstable arrhythmias. Arrest more likely 2/2 to bradycardia iso severe metabolic acidosis.     Recommendations:  - continue holding AC for now as pt remains in NSR, will re-discuss once more stabilized and out of the ICU  - trend trop to peak  - please get lipids, TSH, A1c  - f/up formal TTE, report of AS seen on POCUS done by MICU team  - pt likely will need ischemic eval given diffuse STDs seen during AFib w/ RVR, however can be done once more stabilized and out of the ICU    Cardiology will sign off for now, please reach out to our team once the pt is extubated, more stabilized and out of the ICU for ischemic evaluation planning.    *** Recommendations are preliminary until cosigned by the attending.    Shashi Wells MD  Cardiology Fellow    For all New Consults and Questions:  [email or web address]   Login: [employer]

## 2023-12-14 NOTE — DIETITIAN INITIAL EVALUATION ADULT - PERTINENT MEDS FT
MEDICATIONS  (STANDING):  chlorhexidine 0.12% Liquid 15 milliLiter(s) Oral Mucosa every 12 hours  chlorhexidine 2% Cloths 1 Application(s) Topical two times a day  dexMEDEtomidine Infusion 0.7 MICROgram(s)/kG/Hr (11 mL/Hr) IV Continuous <Continuous>  dextrose 5%. 1000 milliLiter(s) (50 mL/Hr) IV Continuous <Continuous>  dextrose 5%. 1000 milliLiter(s) (60 mL/Hr) IV Continuous <Continuous>  dextrose 50% Injectable 25 Gram(s) IV Push once  glucagon  Injectable 1 milliGRAM(s) IntraMuscular once  heparin   Injectable 5000 Unit(s) SubCutaneous every 8 hours  influenza  Vaccine (HIGH DOSE) 0.7 milliLiter(s) IntraMuscular once  insulin lispro (ADMELOG) corrective regimen sliding scale   SubCutaneous every 6 hours  insulin regular Infusion 0.5 Unit(s)/Hr (0.5 mL/Hr) IV Continuous <Continuous>  norepinephrine Infusion 0.05 MICROgram(s)/kG/Min (5.81 mL/Hr) IV Continuous <Continuous>  petrolatum Ophthalmic Ointment 1 Application(s) Both EYES two times a day  piperacillin/tazobactam IVPB.. 3.375 Gram(s) IV Intermittent every 12 hours  propofol Infusion 30 MICROgram(s)/kG/Min (11.3 mL/Hr) IV Continuous <Continuous>    MEDICATIONS  (PRN):  dextrose Oral Gel 15 Gram(s) Oral once PRN Blood Glucose LESS THAN 70 milliGRAM(s)/deciliter

## 2023-12-14 NOTE — DIETITIAN INITIAL EVALUATION ADULT - ORAL INTAKE PTA/DIET HISTORY
Brief Hx obtained from daughter. Pt. had good appetite/PO intake PTA.  NKFA. No known Hx of chewing/swallowing issues.  Eats all varieties of food. Takes Vitamin E supplement.

## 2023-12-14 NOTE — EEG REPORT - NS EEG TEXT BOX
[facility]   COMPREHENSIVE EPILEPSY CENTER   REPORT OF CONTINUOUS VIDEO EEG     [facility]: [address], [place] [address], Ph#: [phone]  [facility]: [address], [place] [address], Ph#: [phone]  Office: [address] [place] [address], [place] [address], [place] [address] Ph#: [phone]    Patient Name: KEE LOUIS  Age and : 88y ([birth year])  MRN #: [id number]  Location: [facility] 6   Referring Physician: Dae Hyeon Kim    Study Date: 23 - 23  Duration: 14 hr 45 min  _____________________________________________________________  STUDY INFORMATION    EEG Recording Technique:  The patient underwent continuous Video-EEG monitoring, using Telemetry System hardware on the XLTek Digital System. EEG and video data were stored on a computer hard drive with important events saved in digital archive files. The material was reviewed by a physician (electroencephalographer / epileptologist) on a daily basis. Jose and seizure detection algorithms were utilized and reviewed. An EEG Technician attended to the patient, and was available throughout daytime work hours.  The epilepsy center neurologist was available in person or on call 24-hours per day.    EEG Placement and Labeling of Electrodes:  The EEG was performed utilizing 20 channel referential EEG connections (coronal over temporal over parasagittal montage) using all standard 10-20 electrode placements with EKG, with additional electrodes placed in the inferior temporal region using the modified 10-10 montage electrode placements for elective admissions, or if deemed necessary. Recording was at a sampling rate of 256 samples per second per channel. Time synchronized digital video recording was done simultaneously with EEG recording. A low light infrared camera was used for low light recording.     _____________________________________________________________  HISTORY    Patient is a 88y old  Male who presents with a chief complaint of transfer for cardiac arrest (14 Dec 2023 08:03)      PERTINENT MEDICATION:  MEDICATIONS  (STANDING):  chlorhexidine 0.12% Liquid 15 milliLiter(s) Oral Mucosa every 12 hours  chlorhexidine 2% Cloths 1 Application(s) Topical two times a day  dexMEDEtomidine Infusion 0.5 MICROgram(s)/kG/Hr (7.84 mL/Hr) IV Continuous <Continuous>  dextrose 5%. 1000 milliLiter(s) (50 mL/Hr) IV Continuous <Continuous>  dextrose 5%. 1000 milliLiter(s) (60 mL/Hr) IV Continuous <Continuous>  dextrose 50% Injectable 25 Gram(s) IV Push once  glucagon  Injectable 1 milliGRAM(s) IntraMuscular once  heparin   Injectable 5000 Unit(s) SubCutaneous every 8 hours  influenza  Vaccine (HIGH DOSE) 0.7 milliLiter(s) IntraMuscular once  insulin glargine Injectable (LANTUS) 5 Unit(s) SubCutaneous once  insulin lispro (ADMELOG) corrective regimen sliding scale   SubCutaneous every 6 hours  insulin regular Infusion 0.5 Unit(s)/Hr (0.5 mL/Hr) IV Continuous <Continuous>  norepinephrine Infusion 0.05 MICROgram(s)/kG/Min (5.81 mL/Hr) IV Continuous <Continuous>  petrolatum Ophthalmic Ointment 1 Application(s) Both EYES two times a day  piperacillin/tazobactam IVPB.. 3.375 Gram(s) IV Intermittent every 12 hours  propofol Infusion 30 MICROgram(s)/kG/Min (11.3 mL/Hr) IV Continuous <Continuous>    _____________________________________________________________  INTERPRETATION    Findings: The background was discontinuous with a generalized burst-suppression pattern (80% of recording, burst duration 1-3 seconds, interburst interval 2-4 seconds), and continuous spontaneously variable (20% of recording), and poorly reactive.     Background Slowing:  -Generalized burst-suppression (80% of recording)  -Continuous diffuse polymorphic delta slowing (20% of recording)    Focal Slowing:   None was present.    Sleep Background:  Stage II sleep transients were not recorded.  Drowsiness and stage II sleep transients were not recorded.    Other Non-Epileptiform Findings:  -Diffuse excess beta activity    Interictal Epileptiform Activity:   None were present.    Events:  Clinical events: None recorded.  Seizures: None recorded.    Artifacts:  Intermittent myogenic and movement artifacts were noted.    ECG:  The heart rate on single channel ECG was predominantly between 60-80 BPM.    _____________________________________________________________  EEG SUMMARY/CLASSIFICATION    Abnormal EEG in an encephalopathic patient.  -Generalized burst-suppression (80% of recording)  -Continuous diffuse polymorphic delta slowing (20% of recording)  -Diffuse excess beta activity  _____________________________________________________________  EEG IMPRESSION/CLINICAL CORRELATE    Abnormal EEG study.  1. Severe nonspecific diffuse or multifocal cerebral dysfunction.   2. Diffuse excess beta activity is often an effect of medications (ie benzodiazepines, barbiturates).  3. No epileptiform discharges or seizures were recorded.    Mathew Tabares MD  Neurology Attending Physician       [facility]   COMPREHENSIVE EPILEPSY CENTER   REPORT OF CONTINUOUS VIDEO EEG     [facility]: [address], [place] [address], Ph#: [phone]  [facility]: [address], [place] [address], Ph#: [phone]  Office: [address] [place] [address], [place] [address], [place] [address] Ph#: [phone]    Patient Name: KEE LOUIS  Age and : 88y ([birth year])  MRN #: [id number]  Location: [facility] 6   Referring Physician: Dae Hyeon Kim    Study Date: 23 - 23  Duration: 14 hr 45 min  _____________________________________________________________  STUDY INFORMATION    EEG Recording Technique:  The patient underwent continuous Video-EEG monitoring, using Telemetry System hardware on the XLTek Digital System. EEG and video data were stored on a computer hard drive with important events saved in digital archive files. The material was reviewed by a physician (electroencephalographer / epileptologist) on a daily basis. Jose and seizure detection algorithms were utilized and reviewed. An EEG Technician attended to the patient, and was available throughout daytime work hours.  The epilepsy center neurologist was available in person or on call 24-hours per day.    EEG Placement and Labeling of Electrodes:  The EEG was performed utilizing 20 channel referential EEG connections (coronal over temporal over parasagittal montage) using all standard 10-20 electrode placements with EKG, with additional electrodes placed in the inferior temporal region using the modified 10-10 montage electrode placements for elective admissions, or if deemed necessary. Recording was at a sampling rate of 256 samples per second per channel. Time synchronized digital video recording was done simultaneously with EEG recording. A low light infrared camera was used for low light recording.     _____________________________________________________________  HISTORY    Patient is a 88y old  Male who presents with a chief complaint of transfer for cardiac arrest (14 Dec 2023 08:03)      PERTINENT MEDICATION:  MEDICATIONS  (STANDING):  chlorhexidine 0.12% Liquid 15 milliLiter(s) Oral Mucosa every 12 hours  chlorhexidine 2% Cloths 1 Application(s) Topical two times a day  dexMEDEtomidine Infusion 0.5 MICROgram(s)/kG/Hr (7.84 mL/Hr) IV Continuous <Continuous>  dextrose 5%. 1000 milliLiter(s) (50 mL/Hr) IV Continuous <Continuous>  dextrose 5%. 1000 milliLiter(s) (60 mL/Hr) IV Continuous <Continuous>  dextrose 50% Injectable 25 Gram(s) IV Push once  glucagon  Injectable 1 milliGRAM(s) IntraMuscular once  heparin   Injectable 5000 Unit(s) SubCutaneous every 8 hours  influenza  Vaccine (HIGH DOSE) 0.7 milliLiter(s) IntraMuscular once  insulin glargine Injectable (LANTUS) 5 Unit(s) SubCutaneous once  insulin lispro (ADMELOG) corrective regimen sliding scale   SubCutaneous every 6 hours  insulin regular Infusion 0.5 Unit(s)/Hr (0.5 mL/Hr) IV Continuous <Continuous>  norepinephrine Infusion 0.05 MICROgram(s)/kG/Min (5.81 mL/Hr) IV Continuous <Continuous>  petrolatum Ophthalmic Ointment 1 Application(s) Both EYES two times a day  piperacillin/tazobactam IVPB.. 3.375 Gram(s) IV Intermittent every 12 hours  propofol Infusion 30 MICROgram(s)/kG/Min (11.3 mL/Hr) IV Continuous <Continuous>    _____________________________________________________________  INTERPRETATION    Findings: The background was discontinuous with a generalized burst-suppression pattern (80% of recording, burst duration 1-3 seconds, interburst interval 2-4 seconds), and continuous spontaneously variable (20% of recording), and poorly reactive.     Background Slowing:  -Generalized burst-suppression (80% of recording)  -Continuous diffuse polymorphic delta slowing (20% of recording)    Focal Slowing:   None was present.    Sleep Background:  Stage II sleep transients were not recorded.  Drowsiness and stage II sleep transients were not recorded.    Other Non-Epileptiform Findings:  -Diffuse excess beta activity    Interictal Epileptiform Activity:   None were present.    Events:  Clinical events: None recorded.  Seizures: None recorded.    Artifacts:  Intermittent myogenic and movement artifacts were noted.    ECG:  The heart rate on single channel ECG was predominantly between 60-80 BPM.    _____________________________________________________________  EEG SUMMARY/CLASSIFICATION    Abnormal EEG in an encephalopathic patient.  -Generalized burst-suppression (80% of recording)  -Continuous diffuse polymorphic delta slowing (20% of recording)  -Diffuse excess beta activity  _____________________________________________________________  EEG IMPRESSION/CLINICAL CORRELATE    Abnormal EEG study.  1. Severe nonspecific diffuse or multifocal cerebral dysfunction.   2. Diffuse excess beta activity is often an effect of medications (ie benzodiazepines, barbiturates).  3. No epileptiform discharges or seizures were recorded.    Mathew Tabares MD  Neurology Attending Physician

## 2023-12-14 NOTE — PROGRESS NOTE ADULT - ASSESSMENT
Mr. Campbell is a 89yo man w/ HTN and DM who presented to [facility] after episode of LOC found to be in AFib  c/b bradycardic/PEA arrest w/ ROSC after 8 minutes with 1 epi,  now transferred to [facility] for cardiac eval. Low suspicion for cardiac etiology for cardiac arrest. Patient transferred to MICU for higher level of care.     Plan:    =======Neuro=======   #intubated/sedated:  -currently intubated on RR18, FIO2:30   - sedated on propofol @ 40 wean prop  - precedex added @ 0.7  - EEG prelim report negative for seizure    =======Resp=======  # Asp Pnu   - intubated vol A/C 18/420/5/30%  - obtain repeat ABG   - proph Zosyn     =======CV=======  #HTN  #cardiac arrest  # AS   [ ] TTE obtained  [ ] 3904 BNP from 1732  [ ] LE doppler negative DVT  - Hold home HTN medications   - isch w/u when medically stable    =======GI=======  #No acute issue   - Start TF Nephorth     =======Endo=======  #T2DM   - ISS, NPO w/ TF   - Insulin Gtt @0.5U/Hr D5% @ 60ml/hr, dc Insulin 2hr after start of Lantus, repeat labs 4h   - beta hydroxybuterate 4.1 -> 0.4   - A1c 5.6  - Hold home SGLT-2     =======Renal=======  #ALEXI   - U/S left lower renal lesion. Asymmetrically atrophic right kidney.  - BUN/Cr 28/2.49  (unclear baseline)   - Avoid nephrotoxin agents   - Renally dose meds   - Cedillo placed  - C/s nephro       =======ID=======  #No acute issues   - Empiric Zosyn   [ ] bcx results pending  [ ] ua negative     Heme  - hold AC, SCD    =======Ethics=======   full code Mr. Campbell is a 89yo man w/ HTN and DM who presented to [facility] after episode of LOC found to be in AFib  c/b bradycardic/PEA arrest w/ ROSC after 8 minutes with 1 epi,  now transferred to [facility] for cardiac eval. Low suspicion for cardiac etiology for cardiac arrest. Patient transferred to MICU for higher level of care.     Plan:    =======Neuro=======   #intubated/sedated:  -currently intubated on RR18, FIO2:30   - sedated on propofol @ 40 wean prop  - precedex added @ 0.7  - EEG prelim report negative for seizure    =======Resp=======  # Asp Pnu   - intubated vol A/C 18/420/5/30%  - obtain repeat ABG   - proph Zosyn     =======CV=======  #HTN  #cardiac arrest  # AS   [ ] TTE obtained  [ ] 3904 BNP from 1732  [ ] LE doppler negative DVT  - Hold home HTN medications   - isch w/u when medically stable    =======GI=======  #No acute issue   - Start TF Nephorth     =======Endo=======  #T2DM   - ISS, NPO w/ TF   - Insulin Gtt @0.5U/Hr D5% @ 60ml/hr, dc Insulin 2hr after start of Lantus, repeat labs 4h   - beta hydroxybuterate 4.1 -> 0.4   - A1c 5.6  - Hold home SGLT-2     =======Renal=======  #ALEXI   - U/S left lower renal lesion. Asymmetrically atrophic right kidney.  - BUN/Cr 28/2.49  (unclear baseline)   - Avoid nephrotoxin agents   - Renally dose meds   - Cedillo placed  - C/s nephro       =======ID=======  #No acute issues   - Empiric Zosyn   [ ] bcx results pending  [ ] ua negative     Heme  - hold AC, SCD    =======Ethics=======   full code 88 Y.O. male with pmh of T2DM and HTN. Patient on SGLT 2 inhibitor at home. [place] in [place] Here to visit. Has had episodes of non-responsiveness but with recovery. Had some workup recently [place] hospital [place] Patient had similar episode in today, presented to ED at [facility]. In the ED had a cardiac arrest, 8 mins with PEA, had AFib, with 1 round of EPI. S/P ROSC. Initial concern for STEMI? with ST depression, transferred to the ED. Evaluated by cards no STEMI. Admitted to MICU. Patient found to have elevated trops, ALEXI, in shock on pressors post arrest.      Plan:    =======Neuro=======   #intubated/sedated:  -currently intubated on RR18, FIO2:30   - sedated on propofol @ 40 wean prop  - precedex added @ 0.7  - EEG prelim report negative for seizure    =======Resp=======  # Asp Pnu   - intubated vol A/C 18/420/5/30%  - obtain repeat ABG   - proph Zosyn     =======CV=======  #HTN  #cardiac arrest  # AS   [ ] TTE obtained  [ ] 3904 BNP from 1732  [ ] LE doppler negative DVT  - Hold home HTN medications   - isch w/u when medically stable    =======GI=======  #No acute issue   - Start TF Nephorth     =======Endo=======  #T2DM   - ISS, NPO w/ TF   - Insulin Gtt @0.5U/Hr D5% @ 60ml/hr, dc Insulin 2hr after start of Lantus, repeat labs 4h   - beta hydroxybuterate 4.1 -> 0.4   - A1c 5.6  - Hold home SGLT-2     =======Renal=======  #ALEXI   - U/S left lower renal lesion. Asymmetrically atrophic right kidney.  - BUN/Cr 28/2.49  (unclear baseline)   - Avoid nephrotoxin agents   - Renally dose meds   - Cedillo placed  - C/s nephro       =======ID=======  #No acute issues   - Empiric Zosyn   [ ] bcx results pending  [ ] ua negative     Heme  - hold AC, SCD    =======Ethics=======   full code 88 Y.O. male with pmh of T2DM and HTN. Patient on SGLT 2 inhibitor at home. [place] lives in [place] Here to visit. Has had episodes of non-responsiveness but with recovery. Had some workup recently at hospital [place] Patient had similar episode in today, presented to ED at [facility]. In the ED had a cardiac arrest, 8 mins with PEA, had AFib, with 1 round of EPI. S/P ROSC. Initial concern for STEMI? with ST depression, transferred to the ED. Evaluated by cards no STEMI. Admitted to MICU. Patient found to have elevated trops, ALEXI, in shock on pressors post arrest.      Plan:    =======Neuro=======   #intubated/sedated:  -currently intubated on RR18, FIO2:30   - sedated on propofol @ 40 wean prop  - precedex added @ 0.7  - EEG prelim report negative for seizure    =======Resp=======  # Asp Pnu   - intubated vol A/C 18/420/5/30%  - obtain repeat ABG   - proph Zosyn     =======CV=======  #HTN  #cardiac arrest  # AS   [ ] TTE obtained  [ ] 3904 BNP from 1732  [ ] LE doppler negative DVT  - Hold home HTN medications   - isch w/u when medically stable    =======GI=======  #No acute issue   - Start TF Nephorth     =======Endo=======  #T2DM   - ISS, NPO w/ TF   - Insulin Gtt @0.5U/Hr D5% @ 60ml/hr, dc Insulin 2hr after start of Lantus, repeat labs 4h   - beta hydroxybuterate 4.1 -> 0.4   - A1c 5.6  - Hold home SGLT-2     =======Renal=======  #ALEXI   - U/S left lower renal lesion. Asymmetrically atrophic right kidney.  - BUN/Cr 28/2.49  (unclear baseline)   - Avoid nephrotoxin agents   - Renally dose meds   - Cedillo placed  - C/s nephro       =======ID=======  #No acute issues   - Empiric Zosyn   [ ] bcx results pending  [ ] ua negative     Heme  - hold AC, SCD    =======Ethics=======   full code 88 Y.O. male with pmh of T2DM and HTN. Patient on SGLT 2 inhibitor at home. [place] lives in [place] Here to visit. Has had episodes of non-responsiveness but with recovery. Had some workup recently [place] hospital [place] Patient had similar episode in today, presented to ED at [facility]. In the ED had a cardiac arrest, 8 mins with PEA, had AFib, with 1 round of EPI. S/P ROSC. Initial concern for STEMI? with ST depression, transferred to the ED. Evaluated by cards no STEMI. Admitted to MICU. Patient found to have elevated trops, ALEXI, in shock on pressors post arrest.      Plan:    =======Neuro=======   #intubated/sedated:  -currently intubated on RR18, FIO2:30   - sedated on propofol @ 20wean prop  - precedex added @ 0.7  - EEG prelim report negative for seizure    =======Resp=======  # Asp Pnu   - intubated vol A/C 18/420/5/30%  - obtain repeat ABG   - proph Zosyn     =======CV=======  #HTN  #cardiac arrest  # AS   [ ] TTE obtained  [ ] 3904 BNP from 1732  [ ] LE doppler negative DVT  - Hold home HTN medications   - isch w/u when medically stable    =======GI=======  #No acute issue   - Start TF Nephorth     =======Endo=======  #T2DM   - ISS, NPO w/ TF   - Insulin Gtt @0.5U/Hr D5% @ 60ml/hr, dc Insulin 2hr after start of Lantus, repeat labs 4h   - beta hydroxybuterate 4.1 -> 0.4   - A1c 5.6  - Hold home SGLT-2     =======Renal=======  #ALEXI   - U/S left lower renal lesion. Asymmetrically atrophic right kidney.  - BUN/Cr 28/2.49  (unclear baseline)   - Avoid nephrotoxin agents   - Renally dose meds   - Cedillo placed  - C/s nephro       =======ID=======  #No acute issues   - Empiric Zosyn   [ ] bcx results pending  [ ] ua negative     Heme  - hold AC, SCD    =======Ethics=======   full code 88 Y.O. male with pmh of T2DM and HTN. Patient on SGLT 2 inhibitor at home. [place] lives in [place] Here to visit. Has had episodes of non-responsiveness but with recovery. Had some workup recently at hospital in [place] Patient had similar episode in today, presented to ED at [facility]. In the ED had a cardiac arrest, 8 mins with PEA, had AFib, with 1 round of EPI. S/P ROSC. Initial concern for STEMI? with ST depression, transferred to the ED. Evaluated by cards no STEMI. Admitted to MICU. Patient found to have elevated trops, ALEXI, in shock on pressors post arrest.      Plan:    =======Neuro=======   #intubated/sedated:  -currently intubated on RR18, FIO2:30   - sedated on propofol @ 20wean prop  - precedex added @ 0.7  - EEG prelim report negative for seizure    =======Resp=======  # Asp Pnu   - intubated vol A/C 18/420/5/30%  - obtain repeat ABG   - proph Zosyn     =======CV=======  #HTN  #cardiac arrest  # AS   [ ] TTE obtained  [ ] 3904 BNP from 1732  [ ] LE doppler negative DVT  - Hold home HTN medications   - isch w/u when medically stable    =======GI=======  #No acute issue   - Start TF Nephorth     =======Endo=======  #T2DM   - ISS, NPO w/ TF   - Insulin Gtt @0.5U/Hr D5% @ 60ml/hr, dc Insulin 2hr after start of Lantus, repeat labs 4h   - beta hydroxybuterate 4.1 -> 0.4   - A1c 5.6  - Hold home SGLT-2     =======Renal=======  #ALEXI   - U/S left lower renal lesion. Asymmetrically atrophic right kidney.  - BUN/Cr 28/2.49  (unclear baseline)   - Avoid nephrotoxin agents   - Renally dose meds   - Cedillo placed  - C/s nephro       =======ID=======  #No acute issues   - Empiric Zosyn   [ ] bcx results pending  [ ] ua negative     Heme  - hold AC, SCD    =======Ethics=======   full code 88 Y.O. male with pmh of T2DM and HTN. Patient on SGLT 2 inhibitor at home. [place] lives [place] Here to visit. Has had episodes of non-responsiveness but with recovery. Had some workup recently at hospital [place] Patient had similar episode in today, presented to ED at [facility]. In the ED had a cardiac arrest, 8 mins with PEA, had AFib, with 1 round of EPI. S/P ROSC. Initial concern for STEMI? with ST depression, transferred to the ED. Evaluated by cards no STEMI. Admitted to MICU. Patient found to have elevated trops, ALEXI, in shock on pressors post arrest.      Plan:    =======Neuro=======   #intubated/sedated:  -currently intubated on RR18, FIO2:30   - sedated on propofol @ 20wean prop  - precedex added @ 0.7  - EEG prelim report negative for seizure    =======Resp=======  # Asp Pnu   - intubated vol A/C 18/420/5/30%  - obtain repeat ABG   - proph Zosyn     =======CV=======  #HTN  #cardiac arrest  # AS   [ ] TTE severe aortic stenosis, EF 56%  [ ] 3904 BNP from 1732  [ ] LE doppler negative DVT  - Hold home HTN medications   - isch w/u when medically stable    =======GI=======  #No acute issue   - Start TF Nephorth     =======Endo=======  #T2DM   - ISS, NPO w/ TF   - Insulin Gtt @0.5U/Hr D5% @ 60ml/hr, dc Insulin 2hr after start of Lantus, repeat labs 4h   - beta hydroxybuterate 4.1 -> 0.4   - A1c 5.6  - Hold home SGLT-2     =======Renal=======  #ALEXI   - U/S left lower renal lesion. Asymmetrically atrophic right kidney.  - BUN/Cr 28/2.49  (unclear baseline)   - Avoid nephrotoxin agents   - Renally dose meds   - Cedillo placed  - C/s nephro       =======ID=======  #No acute issues   - Empiric Zosyn   [ ] bcx results pending  [ ] ua negative     Heme  - hold AC, SCD    =======Ethics=======   full code 88 Y.O. male with pmh of T2DM and HTN. Patient on SGLT 2 inhibitor at home. [place] lives in [place] Here to visit. Has had episodes of non-responsiveness but with recovery. Had some workup recently at hospital [place] Patient had similar episode in today, presented to ED at [facility]. In the ED had a cardiac arrest, 8 mins with PEA, had AFib, with 1 round of EPI. S/P ROSC. Initial concern for STEMI? with ST depression, transferred to the ED. Evaluated by cards no STEMI. Admitted to MICU. Patient found to have elevated trops, ALEXI, in shock on pressors post arrest.      Plan:    =======Neuro=======   #intubated/sedated:  -currently intubated on RR18, FIO2:30   - sedated on propofol @ 20wean prop  - precedex added @ 0.7  - EEG prelim report negative for seizure    =======Resp=======  # Asp Pnu   - intubated vol A/C 18/420/5/30%  - obtain repeat ABG   - proph Zosyn     =======CV=======  #HTN  #cardiac arrest  # AS   [ ] TTE severe aortic stenosis, EF 56%  [ ] 3904 BNP from 1732  [ ] LE doppler negative DVT  - Hold home HTN medications   - isch w/u when medically stable    =======GI=======  #No acute issue   - Start TF Nephorth     =======Endo=======  #T2DM   - ISS, NPO w/ TF   - Insulin Gtt @0.5U/Hr D5% @ 60ml/hr, dc Insulin 2hr after start of Lantus, repeat labs 4h   - beta hydroxybuterate 4.1 -> 0.4   - A1c 5.6  - Hold home SGLT-2     =======Renal=======  #ALEXI   - U/S left lower renal lesion. Asymmetrically atrophic right kidney.  - BUN/Cr 28/2.49  (unclear baseline)   - Avoid nephrotoxin agents   - Renally dose meds   - Cedillo placed  - C/s nephro       =======ID=======  #No acute issues   - Empiric Zosyn   [ ] bcx results pending  [ ] ua negative     Heme  - hold AC, SCD    =======Ethics=======   full code 88 Y.O. male with pmh of T2DM and HTN. Patient on SGLT 2 inhibitor at home. [place] lives in [place] Here to visit. Has had episodes of non-responsiveness but with recovery. Had some workup recently at hospital in [place] Patient had similar episode in today, presented to ED at [facility]. In the ED had a cardiac arrest, 8 mins with PEA, had AFib, with 1 round of EPI. S/P ROSC. Initial concern for STEMI? with ST depression, transferred to the ED. Evaluated by cards no STEMI. Admitted to MICU. Patient found to have elevated trops, ALEXI, in shock on pressors post arrest.      Plan:    =======Neuro=======   #intubated/sedated:  -currently intubated on RR18, FIO2:30   - sedated on propofol @ 20wean prop  - precedex added @ 0.7  - EEG prelim report negative for seizure    =======Resp=======  # Asp Pnu   - intubated vol A/C 18/420/5/30%  - obtain repeat ABG   - proph Zosyn     =======CV=======  #HTN  #cardiac arrest  # AS   # Shock likely Vasoplegic sec to sedation  [ ] TTE severe aortic stenosis, EF 56%  [ ] 3904 BNP from 1732  [ ] LE doppler negative DVT  [ ] on levophed @ 0.03  - Hold home HTN medications   - isch w/u when medically stable/extubated    =======GI=======  #No acute issue   - Start TF Glucerna 1.5    =======Endo=======  #T2DM   - ISS, NPO w/ TF   - Insulin Gtt @0.5U/Hr D5% @ 60ml/hr, dc Insulin 2hr after start of Lantus, repeat labs 4h   - beta hydroxybuterate 4.1 -> 0.4   - A1c 5.6  - Hold home SGLT-2     =======Renal=======  #ALEXI   - U/S left lower renal lesion. Asymmetrically atrophic right kidney.  - BUN/Cr 28/2.49  (unclear baseline)   - Avoid nephrotoxin agents   - Renally dose meds   - Cedillo placed  - C/s nephro       =======ID=======  #No acute issues   - Empiric Zosyn   [ ] bcx results pending  [ ] ua negative     Heme  - hold AC, SCD    =======Ethics=======   full code 88 Y.O. male with pmh of T2DM and HTN. Patient on SGLT 2 inhibitor at home. [place] lives in [place] Here to visit. Has had episodes of non-responsiveness but with recovery. Had some workup recently at hospital in [place] Patient had similar episode in today, presented to ED at [facility]. In the ED had a cardiac arrest, 8 mins with PEA, had AFib, with 1 round of EPI. S/P ROSC. Initial concern for STEMI? with ST depression, transferred to the ED. Evaluated by cards no STEMI. Admitted to MICU. Patient found to have elevated trops, ALEXI, in shock on pressors post arrest.      Plan:    =======Neuro=======   #intubated/sedated:  -currently intubated on RR18, FIO2:30   - sedated on propofol @ 20wean prop  - precedex added @ 0.7  - EEG prelim report negative for seizure    =======Resp=======  # Asp Pnu   - intubated vol A/C 18/420/5/30%  - obtain repeat ABG   - proph Zosyn     =======CV=======  #HTN  #cardiac arrest  # AS   # Shock likely Vasoplegic sec to sedation  [ ] TTE severe aortic stenosis, EF 56%  [ ] 3904 BNP from 1732  [ ] LE doppler negative DVT  [ ] on levophed @ 0.03  - Hold home HTN medications   - isch w/u when medically stable/extubated    =======GI=======  #No acute issue   - Start TF Glucerna 1.5    =======Endo=======  #T2DM   - ISS, NPO w/ TF   - Insulin Gtt @0.5U/Hr D5% @ 60ml/hr, dc Insulin 2hr after start of Lantus, repeat labs 4h   - beta hydroxybuterate 4.1 -> 0.4   - A1c 5.6  - Hold home SGLT-2     =======Renal=======  #ALEXI   - U/S left lower renal lesion. Asymmetrically atrophic right kidney.  - BUN/Cr 28/2.49  (unclear baseline)   - Avoid nephrotoxin agents   - Renally dose meds   - Cedillo placed  - C/s nephro       =======ID=======  #No acute issues   - Empiric Zosyn   [ ] bcx results pending  [ ] ua negative     Heme  - hold AC, SCD    =======Ethics=======   full code 88 Y.O. male with pmh of T2DM and HTN. Patient on SGLT 2 inhibitor at home. [place] lives in [place] Here to visit. Has had episodes of non-responsiveness but with recovery. Had some workup recently at hospital in [place] Patient had similar episode in today, presented to ED at [facility]. In the ED had a cardiac arrest, 8 mins with PEA, had AFib, with 1 round of EPI. S/P ROSC. Initial concern for STEMI? with ST depression, transferred to the ED. Evaluated by cards no STEMI. Admitted to MICU. Patient found to have elevated trops, ALEXI, in shock on pressors post arrest.      Plan:    =======Neuro=======   #intubated/sedated:  -currently intubated on RR18, FIO2:30   - sedated on propofol @ 20wean prop  - precedex added @ 0.7  - EEG prelim report negative for seizure    =======Resp=======  # Asp Pnu   - intubated vol A/C 18/420/5/30%  - obtain repeat ABG   - proph Zosyn     =======CV=======  #HTN  #cardiac arrest  # AS   # Shock likely Vasoplegic sec to sedation  [ ] TTE severe aortic stenosis, EF 56%  [ ] 3904 BNP from 1732  [ ] LE doppler negative DVT  [ ] on levophed @ 0.03  - Hold home HTN medications   - isch w/u when medically stable/extubated    =======GI=======  #No acute issue   # Mild Transaminitis post arrest  - Start TF Glucerna 1.5  - monitor LFTs     =======Renal=======  #ALEXI   - U/S left lower renal lesion. Asymmetrically atrophic right kidney.  - BUN/Cr 28/2.49  (unclear baseline)   - Avoid nephrotoxin agents   - Renally dose meds   - Cedillo placed  - C/s nephro     =======Endo=======  #T2DM   - ISS, NPO w/ TF   - Insulin Gtt @0.5U/Hr D5% @ 60ml/hr, dc Insulin 2hr after start of Lantus, repeat labs 4h   - beta hydroxybuterate 4.1 -> 0.4   - A1c 5.6  - Hold home SGLT-2     =======ID=======  #No acute issues   - Empiric Zosyn   [ ] bcx results pending  [ ] ua negative     =======Heme=======  - hold AC, SCD    =======Ethics=======   full code 88 Y.O. male with pmh of T2DM and HTN. Patient on SGLT 2 inhibitor at home. Usually lives in [place] Here to visit. Has had episodes of non-responsiveness but with recovery. Had some workup recently at hospital in [place] Patient had similar episode in today, presented to ED at [facility]. In the ED had a cardiac arrest, 8 mins with PEA, had AFib, with 1 round of EPI. S/P ROSC. Initial concern for STEMI? with ST depression, transferred to the ED. Evaluated by cards no STEMI. Admitted to MICU. Patient found to have elevated trops, ALEXI, in shock on pressors post arrest.      Plan:    =======Neuro=======   #intubated/sedated:  -currently intubated on RR18, FIO2:30   - sedated on propofol @ 20wean prop  - precedex added @ 0.7  - EEG prelim report negative for seizure    =======Resp=======  # Asp Pnu   - intubated vol A/C 18/420/5/30%  - obtain repeat ABG   - proph Zosyn     =======CV=======  #HTN  #cardiac arrest  # AS   # Shock likely Vasoplegic sec to sedation  [ ] TTE severe aortic stenosis, EF 56%  [ ] 3904 BNP from 1732  [ ] LE doppler negative DVT  [ ] on levophed @ 0.03  - Hold home HTN medications   - isch w/u when medically stable/extubated    =======GI=======  #No acute issue   # Mild Transaminitis post arrest  - Start TF Glucerna 1.5  - monitor LFTs     =======Renal=======  #ALEXI   - U/S left lower renal lesion. Asymmetrically atrophic right kidney.  - BUN/Cr 28/2.49  (unclear baseline)   - Avoid nephrotoxin agents   - Renally dose meds   - Cedillo placed  - C/s nephro     =======Endo=======  #T2DM   - ISS, NPO w/ TF   - Insulin Gtt @0.5U/Hr D5% @ 60ml/hr, dc Insulin 2hr after start of Lantus, repeat labs 4h   - beta hydroxybuterate 4.1 -> 0.4   - A1c 5.6  - Hold home SGLT-2     =======ID=======  #No acute issues   - Empiric Zosyn   [ ] bcx results pending  [ ] ua negative     =======Heme=======  - hold AC, SCD    =======Ethics=======   full code 88 Y.O. male with pmh of T2DM and HTN. Patient on SGLT 2 inhibitor at home. [place] lives in [place] Here to visit. Has had episodes of non-responsiveness but with recovery. Had some workup recently at hospital in [place] Patient had similar episode in today, presented to ED at [facility]. In the ED had a cardiac arrest, 8 mins with PEA, had AFib, with 1 round of EPI. S/P ROSC. Initial concern for STEMI? with ST depression, transferred to the ED. Evaluated by cards no STEMI. Admitted to MICU. Patient found to have elevated trops, ALEXI, in shock on pressors post arrest.      Plan:    =======Neuro=======   #intubated/sedated:  -currently intubated on RR18, FIO2:30   - sedated on propofol @ 20wean prop  - precedex added @ 0.7  - EEG prelim report negative for seizure    =======Resp=======  # Asp Pnu   - intubated vol A/C 18/420/5/30%  - obtain repeat ABG   - proph Zosyn     =======CV=======  #HTN  #cardiac arrest  # AS   # Shock likely Vasoplegic sec to sedation  [ ] TTE severe aortic stenosis, EF 56%  [ ] 3904 BNP from 1732  [ ] LE doppler negative DVT  [ ] on levophed @ 0.03  - Hold home HTN medications   - isch w/u when medically stable/extubated    =======GI=======  #No acute issue   # Mild Transaminitis post arrest  - Start TF Glucerna 1.5  - monitor LFTs     =======Renal=======  #ALEXI   - U/S left lower renal lesion. Asymmetrically atrophic right kidney.  - BUN/Cr 28/2.49  (unclear baseline)   - Avoid nephrotoxin agents   - Renally dose meds   - Cedillo placed  - C/s nephro     =======Endo=======  #T2DM   - ISS, NPO w/ TF   - Insulin Gtt dc transitioned to 5 U of Lantus/ISS, cont to monitor FS q6h   - beta hydroxybuterate 4.1 -> 0.4   - A1c 5.6  - Hold home SGLT-2     =======ID=======  #No acute issues   - Empiric Zosyn   [ ] bcx results pending  [ ] ua negative     =======Heme=======  - hold AC, SCD    =======Ethics=======   full code 88 Y.O. male with pmh of T2DM and HTN. Patient on SGLT 2 inhibitor at home[place] in [place] Here to visit. Has had episodes of non-responsiveness but with recovery. Had some workup recently at hospital in [place] Patient had similar episode in today, presented to ED at [facility]. In the ED had a cardiac arrest, 8 mins with PEA, had AFib, with 1 round of EPI. S/P ROSC. Initial concern for STEMI? with ST depression, transferred to the ED. Evaluated by cards no STEMI. Admitted to MICU. Patient found to have elevated trops, ALEXI, in shock on pressors post arrest.      Plan:    =======Neuro=======   #intubated/sedated:  -currently intubated on RR18, FIO2:30   - sedated on propofol @ 20wean prop  - precedex added @ 0.7  - EEG prelim report negative for seizure    =======Resp=======  # Asp Pnu   - intubated vol A/C 18/420/5/30%  - obtain repeat ABG   - proph Zosyn     =======CV=======  #HTN  #cardiac arrest  # AS   # Shock likely Vasoplegic sec to sedation  [ ] TTE severe aortic stenosis, EF 56%  [ ] 3904 BNP from 1732  [ ] LE doppler negative DVT  [ ] on levophed @ 0.03  - Hold home HTN medications   - isch w/u when medically stable/extubated    =======GI=======  #No acute issue   # Mild Transaminitis post arrest  - Start TF Glucerna 1.5  - monitor LFTs     =======Renal=======  #ALEXI   - U/S left lower renal lesion. Asymmetrically atrophic right kidney.  - BUN/Cr 28/2.49  (unclear baseline)   - Avoid nephrotoxin agents   - Renally dose meds   - Cedillo placed  - C/s nephro     =======Endo=======  #T2DM   - ISS, NPO w/ TF   - Insulin Gtt dc transitioned to 5 U of Lantus/ISS, cont to monitor FS q6h   - beta hydroxybuterate 4.1 -> 0.4   - A1c 5.6  - Hold home SGLT-2     =======ID=======  #No acute issues   - Empiric Zosyn   [ ] bcx results pending  [ ] ua negative     =======Heme=======  - hold AC, SCD    =======Ethics=======   full code 88 Y.O. male with pmh of T2DM and HTN. Patient on SGLT 2 inhibitor at home. Usually lives in [place] Here to visit. Has had episodes of non-responsiveness but with recovery. Had some workup recently at hospital in [place]. Patient had similar episode in today, presented to ED at [facility]. In the ED had a cardiac arrest, 8 mins with PEA, had AFib, with 1 round of EPI. S/P ROSC. Initial concern for STEMI? with ST depression, transferred to the ED. Evaluated by cards no STEMI. Admitted to MICU. Patient found to have elevated trops, ALEXI, in shock on pressors post arrest.      Plan:    =======Neuro=======   #intubated/sedated:  -currently intubated on RR18, FIO2:30   - sedated on propofol @ 20wean prop  - precedex added @ 0.7  - EEG prelim report negative for seizure    =======Resp=======  # Asp Pnu   - intubated vol A/C 18/420/5/30%  - obtain repeat ABG   - proph Zosyn     =======CV=======  #HTN  #cardiac arrest  # AS   # Shock likely Vasoplegic sec to sedation  [ ] TTE severe aortic stenosis, EF 56%  [ ] 3904 BNP from 1732  [ ] LE doppler negative DVT  [ ] on levophed @ 0.01  - Norvasc 5mg started 12/15  - home BP medication Candesar- H 32/25  - isch w/u when medically stable/extubated    =======GI=======  #No acute issue   # Mild Transaminitis post arrest  - Start TF Glucerna 1.5  - monitor LFTs     =======Renal=======  #ALEXI   - U/S left lower renal lesion. Asymmetrically atrophic right kidney.  - BUN/Cr 28/2.49  (unclear baseline)   - Avoid nephrotoxin agents   - Renally dose meds   - Cedillo placed  - C/s nephro     =======Endo=======  #T2DM   - ISS, NPO w/ TF   - Insulin Gtt dc transitioned to 5 U of Lantus/ISS, cont to monitor FS q6h   - beta hydroxybuterate 4.1 -> 0.4   - A1c 5.6  - Hold home SGLT-2     =======ID=======  #No acute issues   - Empiric Zosyn   [ ] bcx results pending  [ ] ua negative     =======Heme=======  - hold AC, SCD    =======Ethics=======   full code 88 Y.O. male with pmh of T2DM and HTN. Patient on SGLT 2 inhibitor at home. Usually lives in [place] Here to visit. Has had episodes of non-responsiveness but with recovery. Had some workup recently at hospital in [place]. Patient had similar episode in today, presented to ED at [facility]. In the ED had a cardiac arrest, 8 mins with PEA, had AFib, with 1 round of EPI. S/P ROSC. Initial concern for STEMI? with ST depression, transferred to the ED. Evaluated by cards no STEMI. Admitted to MICU. Patient found to have elevated trops, ALEXI, in shock on pressors post arrest.      Plan:    =======Neuro=======   #intubated/sedated:  -currently intubated on RR18, FIO2:30   - sedated on propofol @ 20wean prop  - precedex added @ 0.7  - EEG prelim report negative for seizure    =======Resp=======  # Asp Pnu   - intubated vol A/C 18/420/5/30%  - obtain repeat ABG   - proph Zosyn     =======CV=======  #HTN  #cardiac arrest  # AS   # Shock likely Vasoplegic sec to sedation  [ ] TTE severe aortic stenosis, EF 56%  [ ] 3904 BNP from 1732  [ ] LE doppler negative DVT  [ ] on levophed @ 0.01  - Norvasc 5mg started 12/15  - home BP medication Candesar- H 32/25  - isch w/u when medically stable/extubated    =======GI=======  #No acute issue   # Mild Transaminitis post arrest  - Start TF Glucerna 1.5  - monitor LFTs     =======Renal=======  #ALEXI   - U/S left lower renal lesion. Asymmetrically atrophic right kidney.  - BUN/Cr 28/2.49  (unclear baseline)   - Avoid nephrotoxin agents   - Renally dose meds   - Cedillo placed  - C/s nephro     =======Endo=======  #T2DM   - ISS, NPO w/ TF   - Insulin Gtt dc transitioned to 5 U of Lantus/ISS, cont to monitor FS q6h   - beta hydroxybuterate 4.1 -> 0.4   - A1c 5.6  - Hold home SGLT-2     =======ID=======  #No acute issues   - Empiric Zosyn   [ ] bcx results pending  [ ] ua negative     =======Heme=======  - hold AC, SCD    =======Ethics=======   full code

## 2023-12-14 NOTE — DIETITIAN INITIAL EVALUATION ADULT - REASON FOR ADMISSION
89 [place] the [place], visiting [place].  Transferred from [facility] after presenting w LOS to [facility] MICU for further care s/p cardiac arrest.  Intubated/sedated.  Further workup for seizures vs CVA.  Also with ALEXI, metabolic acidosis & euglycemic DKA requiring insulin drip. 87 yo [place] the [place], visiting daughter.  Transferred from [facility] after presenting w LOS to [facility] MICU for further care s/p cardiac arrest.  Intubated/sedated.  Further workup for seizures vs CVA.  Also with ALEXI, metabolic acidosis & euglycemic DKA requiring insulin drip.

## 2023-12-14 NOTE — DIETITIAN INITIAL EVALUATION ADULT - PERTINENT LABORATORY DATA
12-14    141  |  111<H>  |  28<H>  ----------------------------<  98  3.8   |  15<L>  |  2.49<H>    Ca    8.8      14 Dec 2023 06:40  Phos  3.1     12-14  Mg     2.00     12-14    CAPILLARY BLOOD GLUCOSE  POCT Blood Glucose.: 89 mg/dL (14 Dec 2023 11:13)  POCT Blood Glucose.: 102 mg/dL (14 Dec 2023 10:14)  POCT Blood Glucose.: 94 mg/dL (14 Dec 2023 09:11)  POCT Blood Glucose.: 97 mg/dL (14 Dec 2023 08:15)  POCT Blood Glucose.: 103 mg/dL (14 Dec 2023 07:03)  POCT Blood Glucose.: 100 mg/dL (14 Dec 2023 06:04)  POCT Blood Glucose.: 103 mg/dL (14 Dec 2023 04:49)  POCT Blood Glucose.: 120 mg/dL (14 Dec 2023 03:34)  POCT Blood Glucose.: 120 mg/dL (14 Dec 2023 02:30)  POCT Blood Glucose.: 129 mg/dL (14 Dec 2023 01:29)  POCT Blood Glucose.: 124 mg/dL (14 Dec 2023 00:18)  POCT Blood Glucose.: 132 mg/dL (13 Dec 2023 23:35)  POCT Blood Glucose.: 120 mg/dL (13 Dec 2023 22:41)  POCT Blood Glucose.: 107 mg/dL (13 Dec 2023 21:29)  POCT Blood Glucose.: 119 mg/dL (13 Dec 2023 20:46)  POCT Blood Glucose.: 149 mg/dL (13 Dec 2023 19:46)  POCT Blood Glucose.: 144 mg/dL (13 Dec 2023 18:35)  POCT Blood Glucose.: 158 mg/dL (13 Dec 2023 17:42)  POCT Blood Glucose.: 183 mg/dL (13 Dec 2023 16:43)  POCT Blood Glucose.: 170 mg/dL (13 Dec 2023 15:40)  POCT Blood Glucose.: 102 mg/dL (12-14-23 @ 10:14)

## 2023-12-14 NOTE — DIETITIAN INITIAL EVALUATION ADULT - ENTERAL
--- D/C Nepro & change to Glucerna 1.5 starting @ 10mL/hr then increase by 10mL q 4hrs to goal of 35mL/hr x 24hrs.    840mL total volume  1260 kcals (+ Propofol KCals)  69g protein  638mL free water

## 2023-12-15 NOTE — PROGRESS NOTE ADULT - CRITICAL CARE ATTENDING COMMENT
The patient is a 88 Y.O. male with pmh of T2DM and HTN. Patient on SGLT 2 inhibitor at home.     Usually lives in [place] Here to visit. Has had episodes of non-responsiveness but with recovery. Had some workup recently at hospital in [place]. Patient had similar episode in today, presented to ED at [facility]. In the ED had a cardiac arrest, 8 mins with PEA, had AFib, with 1 round of EPI. S/P ROSC. Initial concern for STEMI? with ST depression, transferred to the ED. Evaluated by cards no STEMI. Admitted to MICU.     Patient found to have elevated trops, ALEXI, in shock on pressors post arrest. also with likely euglycemic DKA on insulin gtt.     # ALEXI  # Cardiac arrest  # Aortic stenosis  # Shock on pressors  # Encephalopathy  # DKA-euglycemic   # Lactic acidosis  # NSTEMI  - S/P cardiac arrest PEA, now intubated and sedated. 8 min down time.   - CTH without acute findings, C/W EEG to r/o sz, wean off sedation, precedex PRN  - Trend trops, per cards not likely to be ACS, likely demand.   - Possible severe AS on POCUS, pending offical TTE, may need CHARLOTTE.   - C/W levo, wean as tolerated  - May need MRI once stable, per family patient patient stopped using his left arm many months ago.   - on SGLT 2 inhibitor with low biacarb, elevated BHB. s/p 3 L in the ED at [facility]. Will limited amount of IVF. Will transition from insulin gtt to basal and ISS given improvement if BHB. Low bicarb is multifactorial fiven renal failure and lactic acidosis.   - ALEXI- 2/2 to shock? place bains. Monitor IOs. possible with some CKD given renal US with one atrophic kidney.   - DVT ppx- SQH  - Dispo- full code. D/w family 2 daughter and son at bedside. Overall poor prognosis. Will have continued GOC. The patient is a 88 Y.O. male with pmh of T2DM and HTN. Patient on SGLT 2 inhibitor at home.     Usually lives in [place] Here to visit. Has had episodes of non-responsiveness but with recovery. Had some workup recently at hospital in [place]. Patient had similar episode in today, presented to ED at OSH. In the ED had a cardiac arrest, 8 mins with PEA, had AFib, with 1 round of EPI. S/P ROSC. Initial concern for STEMI? with ST depression, transferred to the ED. Evaluated by cards no STEMI. Admitted to MICU.     Patient found to have elevated trops, ALEXI, in shock on pressors post arrest. also with likely euglycemic DKA on insulin gtt.     # ALEXI  # Cardiac arrest  # Aortic stenosis  # Shock on pressors  # Encephalopathy  # DKA-euglycemic   # Lactic acidosis  # NSTEMI  - S/P cardiac arrest PEA, now intubated and sedated. 8 min down time.   - CTH without acute findings, C/W EEG to r/o sz, wean off sedation, precedex PRN  - Trend trops, per cards not likely to be ACS, likely demand.   - Possible severe AS on POCUS, official TTE with severe AS, Will need structural cards once patient extubated and optimized.   - C/W levo, wean as tolerated  - May need MRI head once stable, per family patient patient stopped using his left arm many months ago.   - on SGLT 2 inhibitor with low biacarb, elevated BHB. s/p 3 L in the ED at [facility]. Will limited amount of IVF. Will transition from insulin gtt to basal and ISS given improvement if BHB. Low bicarb is multifactorial given renal failure and lactic acidosis.   - ALEXI- 2/2 to shock? place Cedillo. Monitor IOs. possible with some CKD given renal US with one atrophic kidney.  - C/W zosyn for likely aspiration pna seen on pocus.   - DVT ppx- SQH  - Dispo- full code. D/w family 2 daughter and son at bedside. Overall poor prognosis. Will have continued GOC.

## 2023-12-15 NOTE — EEG REPORT - NS EEG TEXT BOX
[facility]   COMPREHENSIVE EPILEPSY CENTER   REPORT OF CONTINUOUS VIDEO EEG     [facility]: [address], [place] [address], Ph#: [phone]  [facility]: [address], [place] [address], Ph#: [phone]  Office: [address] [place] [address], [place] [address], [place] [address] Ph#: [phone]    Patient Name: KEE LOUIS  Age and : 88y ([birth year])  MRN #: [id number]  Location: [facility] 6   Referring Physician: Dae Hyeon Kim    Study Date: 23 - 12-15-23  Duration: 23 hr 54 min  _____________________________________________________________  STUDY INFORMATION    EEG Recording Technique:  The patient underwent continuous Video-EEG monitoring, using Telemetry System hardware on the XLTek Digital System. EEG and video data were stored on a computer hard drive with important events saved in digital archive files. The material was reviewed by a physician (electroencephalographer / epileptologist) on a daily basis. Jose and seizure detection algorithms were utilized and reviewed. An EEG Technician attended to the patient, and was available throughout daytime work hours.  The epilepsy center neurologist was available in person or on call 24-hours per day.    EEG Placement and Labeling of Electrodes:  The EEG was performed utilizing 20 channel referential EEG connections (coronal over temporal over parasagittal montage) using all standard 10-20 electrode placements with EKG, with additional electrodes placed in the inferior temporal region using the modified 10-10 montage electrode placements for elective admissions, or if deemed necessary. Recording was at a sampling rate of 256 samples per second per channel. Time synchronized digital video recording was done simultaneously with EEG recording. A low light infrared camera was used for low light recording.     _____________________________________________________________  HISTORY    Patient is a 88y old  Male who presents with a chief complaint of transfer for cardiac arrest (14 Dec 2023 08:03)      PERTINENT MEDICATION:  MEDICATIONS  (STANDING):  chlorhexidine 0.12% Liquid 15 milliLiter(s) Oral Mucosa every 12 hours  chlorhexidine 2% Cloths 1 Application(s) Topical two times a day  dexMEDEtomidine Infusion 0.5 MICROgram(s)/kG/Hr (7.84 mL/Hr) IV Continuous <Continuous>  dextrose 5%. 1000 milliLiter(s) (50 mL/Hr) IV Continuous <Continuous>  dextrose 5%. 1000 milliLiter(s) (60 mL/Hr) IV Continuous <Continuous>  dextrose 50% Injectable 25 Gram(s) IV Push once  glucagon  Injectable 1 milliGRAM(s) IntraMuscular once  heparin   Injectable 5000 Unit(s) SubCutaneous every 8 hours  influenza  Vaccine (HIGH DOSE) 0.7 milliLiter(s) IntraMuscular once  insulin glargine Injectable (LANTUS) 5 Unit(s) SubCutaneous once  insulin lispro (ADMELOG) corrective regimen sliding scale   SubCutaneous every 6 hours  insulin regular Infusion 0.5 Unit(s)/Hr (0.5 mL/Hr) IV Continuous <Continuous>  norepinephrine Infusion 0.05 MICROgram(s)/kG/Min (5.81 mL/Hr) IV Continuous <Continuous>  petrolatum Ophthalmic Ointment 1 Application(s) Both EYES two times a day  piperacillin/tazobactam IVPB.. 3.375 Gram(s) IV Intermittent every 12 hours  propofol Infusion 30 MICROgram(s)/kG/Min (11.3 mL/Hr) IV Continuous <Continuous>    _____________________________________________________________  INTERPRETATION    Findings: The background was mostly discontinuous with a generalized burst-suppression pattern (burst duration 1-3 seconds, interburst interval 2-4 seconds) during the first half of the recording. There was increased continuity during the second half, spontaneously variable with irregular delta>theta and present reactivity.     Background Slowing:  -as above.    Focal Slowing:   None was present.    Sleep Background:  Stage II sleep transients were not recorded.  Drowsiness and stage II sleep transients were not recorded.    Other Non-Epileptiform Findings:  None    Interictal Epileptiform Activity:   None were present.    Events:  Clinical events: None recorded.  Seizures: None recorded.    Artifacts:  Intermittent myogenic and movement artifacts were noted.    ECG:  The heart rate on single channel ECG was predominantly between 60-80 BPM.  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  -Generalized burst-suppression during the initial half of the recording with increased continuity, diffuse delta>theta slowing on the latter half.  _____________________________________________________________  EEG IMPRESSION/CLINICAL CORRELATE    Abnormal EEG study.  1. Moderate to severe nonspecific diffuse or multifocal cerebral dysfunction.   3. No epileptiform discharges or seizures were recorded.  This is a preliminary report pending attending review and attestation.    Eileen Ghosh MD  Fellow, [facility]       [facility]   COMPREHENSIVE EPILEPSY CENTER   REPORT OF CONTINUOUS VIDEO EEG     [facility]: [address], [place] [address], Ph#: [phone]  [facility]: [address], [place] [address], Ph#: [phone]  Office: [address] [place] [address], [place] [address], [place] [address] Ph#: [phone]    Patient Name: KEE LOUIS  Age and : 88y ([birth year])  MRN #: [id number]  Location: [facility] 6   Referring Physician: Dae Hyeon Kim    Study Date: 23 - 12-15-23  Duration: 23 hr 54 min  _____________________________________________________________  STUDY INFORMATION    EEG Recording Technique:  The patient underwent continuous Video-EEG monitoring, using Telemetry System hardware on the XLTek Digital System. EEG and video data were stored on a computer hard drive with important events saved in digital archive files. The material was reviewed by a physician (electroencephalographer / epileptologist) on a daily basis. Jose and seizure detection algorithms were utilized and reviewed. An EEG Technician attended to the patient, and was available throughout daytime work hours.  The epilepsy center neurologist was available in person or on call 24-hours per day.    EEG Placement and Labeling of Electrodes:  The EEG was performed utilizing 20 channel referential EEG connections (coronal over temporal over parasagittal montage) using all standard 10-20 electrode placements with EKG, with additional electrodes placed in the inferior temporal region using the modified 10-10 montage electrode placements for elective admissions, or if deemed necessary. Recording was at a sampling rate of 256 samples per second per channel. Time synchronized digital video recording was done simultaneously with EEG recording. A low light infrared camera was used for low light recording.     _____________________________________________________________  HISTORY    Patient is a 88y old  Male who presents with a chief complaint of transfer for cardiac arrest (14 Dec 2023 08:03)      PERTINENT MEDICATION:  MEDICATIONS  (STANDING):  chlorhexidine 0.12% Liquid 15 milliLiter(s) Oral Mucosa every 12 hours  chlorhexidine 2% Cloths 1 Application(s) Topical two times a day  dexMEDEtomidine Infusion 0.5 MICROgram(s)/kG/Hr (7.84 mL/Hr) IV Continuous <Continuous>  dextrose 5%. 1000 milliLiter(s) (50 mL/Hr) IV Continuous <Continuous>  dextrose 5%. 1000 milliLiter(s) (60 mL/Hr) IV Continuous <Continuous>  dextrose 50% Injectable 25 Gram(s) IV Push once  glucagon  Injectable 1 milliGRAM(s) IntraMuscular once  heparin   Injectable 5000 Unit(s) SubCutaneous every 8 hours  influenza  Vaccine (HIGH DOSE) 0.7 milliLiter(s) IntraMuscular once  insulin glargine Injectable (LANTUS) 5 Unit(s) SubCutaneous once  insulin lispro (ADMELOG) corrective regimen sliding scale   SubCutaneous every 6 hours  insulin regular Infusion 0.5 Unit(s)/Hr (0.5 mL/Hr) IV Continuous <Continuous>  norepinephrine Infusion 0.05 MICROgram(s)/kG/Min (5.81 mL/Hr) IV Continuous <Continuous>  petrolatum Ophthalmic Ointment 1 Application(s) Both EYES two times a day  piperacillin/tazobactam IVPB.. 3.375 Gram(s) IV Intermittent every 12 hours  propofol Infusion 30 MICROgram(s)/kG/Min (11.3 mL/Hr) IV Continuous <Continuous>    _____________________________________________________________  INTERPRETATION    Findings: The background was mostly discontinuous with a generalized burst-suppression pattern (burst duration 1-3 seconds, interburst interval 2-4 seconds) during the first half of the recording. There was increased continuity during the second half, spontaneously variable with irregular delta>theta and present reactivity.     Background Slowing:  -as above.    Focal Slowing:   None was present.    Sleep Background:  Stage II sleep transients were not recorded.  Drowsiness and stage II sleep transients were not recorded.    Other Non-Epileptiform Findings:  None    Interictal Epileptiform Activity:   None were present.    Events:  Clinical events: None recorded.  Seizures: None recorded.    Artifacts:  Intermittent myogenic and movement artifacts were noted.    ECG:  The heart rate on single channel ECG was predominantly between 60-80 BPM.  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  -Generalized burst-suppression during the initial half of the recording with increased continuity, diffuse delta>theta slowing on the latter half.  _____________________________________________________________  EEG IMPRESSION/CLINICAL CORRELATE    Abnormal EEG study.  1. Moderate to severe nonspecific diffuse or multifocal cerebral dysfunction.   3. No epileptiform discharges or seizures were recorded.  This is a preliminary report pending attending review and attestation.    Eileen Ghosh MD  Fellow, [facility]       [facility]   COMPREHENSIVE EPILEPSY CENTER   REPORT OF CONTINUOUS VIDEO EEG     [facility]: [address], [place] [address], Ph#: [phone]  [facility]: [address], [place] [address], Ph#: [phone]  Office: [address] [place] [address], [place] [address], [place] [address] Ph#: [phone]    Patient Name: KEE LOUIS  Age and : 88y ([birth year])  MRN #: [id number]  Location: [facility] 6   Referring Physician: Dae Hyeon Kim    Study Date: 23 - 12-15-23  Duration: 23 hr 54 min  _____________________________________________________________  STUDY INFORMATION    EEG Recording Technique:  The patient underwent continuous Video-EEG monitoring, using Telemetry System hardware on the XLTek Digital System. EEG and video data were stored on a computer hard drive with important events saved in digital archive files. The material was reviewed by a physician (electroencephalographer / epileptologist) on a daily basis. Jose and seizure detection algorithms were utilized and reviewed. An EEG Technician attended to the patient, and was available throughout daytime work hours.  The epilepsy center neurologist was available in person or on call 24-hours per day.    EEG Placement and Labeling of Electrodes:  The EEG was performed utilizing 20 channel referential EEG connections (coronal over temporal over parasagittal montage) using all standard 10-20 electrode placements with EKG, with additional electrodes placed in the inferior temporal region using the modified 10-10 montage electrode placements for elective admissions, or if deemed necessary. Recording was at a sampling rate of 256 samples per second per channel. Time synchronized digital video recording was done simultaneously with EEG recording. A low light infrared camera was used for low light recording.     _____________________________________________________________  HISTORY    Patient is a 88y old  Male who presents with a chief complaint of transfer for cardiac arrest (14 Dec 2023 08:03)      PERTINENT MEDICATION:  MEDICATIONS  (STANDING):  chlorhexidine 0.12% Liquid 15 milliLiter(s) Oral Mucosa every 12 hours  chlorhexidine 2% Cloths 1 Application(s) Topical two times a day  dexMEDEtomidine Infusion 0.5 MICROgram(s)/kG/Hr (7.84 mL/Hr) IV Continuous <Continuous>  dextrose 5%. 1000 milliLiter(s) (50 mL/Hr) IV Continuous <Continuous>  dextrose 5%. 1000 milliLiter(s) (60 mL/Hr) IV Continuous <Continuous>  dextrose 50% Injectable 25 Gram(s) IV Push once  glucagon  Injectable 1 milliGRAM(s) IntraMuscular once  heparin   Injectable 5000 Unit(s) SubCutaneous every 8 hours  influenza  Vaccine (HIGH DOSE) 0.7 milliLiter(s) IntraMuscular once  insulin glargine Injectable (LANTUS) 5 Unit(s) SubCutaneous once  insulin lispro (ADMELOG) corrective regimen sliding scale   SubCutaneous every 6 hours  insulin regular Infusion 0.5 Unit(s)/Hr (0.5 mL/Hr) IV Continuous <Continuous>  norepinephrine Infusion 0.05 MICROgram(s)/kG/Min (5.81 mL/Hr) IV Continuous <Continuous>  petrolatum Ophthalmic Ointment 1 Application(s) Both EYES two times a day  piperacillin/tazobactam IVPB.. 3.375 Gram(s) IV Intermittent every 12 hours  propofol Infusion 30 MICROgram(s)/kG/Min (11.3 mL/Hr) IV Continuous <Continuous>    _____________________________________________________________  INTERPRETATION    Findings: The background was mostly discontinuous with a generalized burst-suppression pattern (burst duration 1-3 seconds, interburst interval 2-4 seconds) during the first half of the recording. There was increased continuity during the second half, spontaneously variable with irregular delta>theta and present reactivity.     Background Slowing:  -as above.    Focal Slowing:   None was present.    Sleep Background:  Stage II sleep transients were not recorded.  Drowsiness and stage II sleep transients were not recorded.    Other Non-Epileptiform Findings:  None    Interictal Epileptiform Activity:   None were present.    Events:  Clinical events: None recorded.  Seizures: None recorded.    Artifacts:  Intermittent myogenic and movement artifacts were noted.    ECG:  The heart rate on single channel ECG was predominantly between 60-80 BPM.  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  -Generalized burst-suppression during the initial half of the recording with increased continuity, diffuse delta>theta slowing on the latter half.  _____________________________________________________________  EEG IMPRESSION/CLINICAL CORRELATE    Abnormal EEG study.  1. Moderate to severe nonspecific diffuse or multifocal cerebral dysfunction.   3. No epileptiform discharges or seizures were recorded.    Eileen Ghosh MD  Fellow, [facility]    Mathew Tabares MD  Neurology Attending Physician         [facility]   COMPREHENSIVE EPILEPSY CENTER   REPORT OF CONTINUOUS VIDEO EEG     [facility]: [address], [place] [address], Ph#: [phone]  [facility]: [address], [place] [address], Ph#: [phone]  Office: [address] [place] [address], [place] [address], [place] [address] Ph#: [phone]    Patient Name: KEE LOUIS  Age and : 88y ([birth year])  MRN #: [id number]  Location: [facility] 6   Referring Physician: Dae Hyeon Kim    Study Date: 23 - 12-15-23  Duration: 23 hr 54 min  _____________________________________________________________  STUDY INFORMATION    EEG Recording Technique:  The patient underwent continuous Video-EEG monitoring, using Telemetry System hardware on the XLTek Digital System. EEG and video data were stored on a computer hard drive with important events saved in digital archive files. The material was reviewed by a physician (electroencephalographer / epileptologist) on a daily basis. Jose and seizure detection algorithms were utilized and reviewed. An EEG Technician attended to the patient, and was available throughout daytime work hours.  The epilepsy center neurologist was available in person or on call 24-hours per day.    EEG Placement and Labeling of Electrodes:  The EEG was performed utilizing 20 channel referential EEG connections (coronal over temporal over parasagittal montage) using all standard 10-20 electrode placements with EKG, with additional electrodes placed in the inferior temporal region using the modified 10-10 montage electrode placements for elective admissions, or if deemed necessary. Recording was at a sampling rate of 256 samples per second per channel. Time synchronized digital video recording was done simultaneously with EEG recording. A low light infrared camera was used for low light recording.     _____________________________________________________________  HISTORY    Patient is a 88y old  Male who presents with a chief complaint of transfer for cardiac arrest (14 Dec 2023 08:03)      PERTINENT MEDICATION:  MEDICATIONS  (STANDING):  chlorhexidine 0.12% Liquid 15 milliLiter(s) Oral Mucosa every 12 hours  chlorhexidine 2% Cloths 1 Application(s) Topical two times a day  dexMEDEtomidine Infusion 0.5 MICROgram(s)/kG/Hr (7.84 mL/Hr) IV Continuous <Continuous>  dextrose 5%. 1000 milliLiter(s) (50 mL/Hr) IV Continuous <Continuous>  dextrose 5%. 1000 milliLiter(s) (60 mL/Hr) IV Continuous <Continuous>  dextrose 50% Injectable 25 Gram(s) IV Push once  glucagon  Injectable 1 milliGRAM(s) IntraMuscular once  heparin   Injectable 5000 Unit(s) SubCutaneous every 8 hours  influenza  Vaccine (HIGH DOSE) 0.7 milliLiter(s) IntraMuscular once  insulin glargine Injectable (LANTUS) 5 Unit(s) SubCutaneous once  insulin lispro (ADMELOG) corrective regimen sliding scale   SubCutaneous every 6 hours  insulin regular Infusion 0.5 Unit(s)/Hr (0.5 mL/Hr) IV Continuous <Continuous>  norepinephrine Infusion 0.05 MICROgram(s)/kG/Min (5.81 mL/Hr) IV Continuous <Continuous>  petrolatum Ophthalmic Ointment 1 Application(s) Both EYES two times a day  piperacillin/tazobactam IVPB.. 3.375 Gram(s) IV Intermittent every 12 hours  propofol Infusion 30 MICROgram(s)/kG/Min (11.3 mL/Hr) IV Continuous <Continuous>    _____________________________________________________________  INTERPRETATION    Findings: The background was mostly discontinuous with a generalized burst-suppression pattern (burst duration 1-3 seconds, interburst interval 2-4 seconds) during the first half of the recording. There was increased continuity during the second half, spontaneously variable with irregular delta>theta and present reactivity.     Background Slowing:  -as above.    Focal Slowing:   None was present.    Sleep Background:  Stage II sleep transients were not recorded.  Drowsiness and stage II sleep transients were not recorded.    Other Non-Epileptiform Findings:  None    Interictal Epileptiform Activity:   None were present.    Events:  Clinical events: None recorded.  Seizures: None recorded.    Artifacts:  Intermittent myogenic and movement artifacts were noted.    ECG:  The heart rate on single channel ECG was predominantly between 60-80 BPM.  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  -Generalized burst-suppression during the initial half of the recording with increased continuity, diffuse delta>theta slowing on the latter half.  _____________________________________________________________  EEG IMPRESSION/CLINICAL CORRELATE    Abnormal EEG study.  1. Moderate to severe nonspecific diffuse or multifocal cerebral dysfunction.   3. No epileptiform discharges or seizures were recorded.    Eileen Ghosh MD  Fellow, [facility]    Mathew Tabares MD  Neurology Attending Physician

## 2023-12-15 NOTE — PROGRESS NOTE ADULT - SUBJECTIVE AND OBJECTIVE BOX
HPI:  Mr. Campbell is a 87yo man w/ HTN and DM who was transferred from [facility] after cardiac arrest for further care,     [place] is from the , [place] rec[place] medical care in the [place]. He is visiting his daughter. Per his daughter, this morning he had a event of LOC that lasted a few minutes where his daughter found him on the ground, unresponsive, w/ gaze deviation and rigid. She is unsure if there was any seizure, but denies any hx of seizures or neurological issues. He regained consciousness spontaneously and was able to recognize his daughter. He was brought to [facility] where he was found to be afebrile w/ normal BP, and tachycardic w/ AFib With rates in the 140-150s. While at [place] pt denied CP, SOB, abd pain, c/d/n/v, LE swelling. While in the ED at [place] he had an acute episode of reported gaze deviation and unresponsiveness. During the episode he became hypoxic, He then lost a pulse and ACLS was started w/ ROSC after 8min with one round of epi. He was intubated and started on prop and levophed. CTH showed no bleed.     Transferred to [facility] for further cardiac eval. Cardiology here at [facility] had low suspicion for Cardiac etiology for Cardiac arrest , recommended MICU consult. EKG notable for PVCs , no ST elevation / no ST depression.        INTERVAL HPI/OVERNIGHT EVENTS: Insulin gtt continues 2.0-1.2-1.8-1.2, D5 @ 30ml/hr, lactate 0.9 from 1.3, bicarb 15    SUBJECTIVE: Patient seen and examined at bedside.     ROS: All negative except as listed above.    VITAL SIGNS:  ICU Vital Signs Last 24 Hrs  T(C): 35.8 (15 Dec 2023 00:00), Max: 37.4 (14 Dec 2023 08:00)  T(F): 96.5 (15 Dec 2023 00:00), Max: 99.3 (14 Dec 2023 08:00)  HR: 67 (15 Dec 2023 06:00) (63 - 89)  BP: --  BP(mean): --  ABP: 115/52 (15 Dec 2023 06:00) (89/54 - 167/68)  ABP(mean): 76 (15 Dec 2023 06:00) (60 - 106)  RR: 18 (15 Dec 2023 06:00) (18 - 24)  SpO2: 100% (15 Dec 2023 06:00) (98% - 100%)    O2 Parameters below as of 15 Dec 2023 06:00  Patient On (Oxygen Delivery Method): ventilator    O2 Concentration (%): 30    Mode: AC/ CMV (Assist Control/ Continuous Mandatory Ventilation), RR (machine): 18, TV (machine): 420, FiO2: 30, PEEP: 5, ITime: 0.6, MAP: 9, PIP: 20  Plateau pressure:   P/F ratio:     12-13 @ 07:01  -  12-14 @ 07:00  --------------------------------------------------------  IN: 1763.9 mL / OUT: 1970 mL / NET: -206.1 mL    12-14 @ 07:01  -  12-15 @ 06:55  --------------------------------------------------------  IN: 1952.3 mL / OUT: 2143 mL / NET: -190.7 mL      CAPILLARY BLOOD GLUCOSE  123 (14 Dec 2023 18:00)      POCT Blood Glucose.: 154 mg/dL (15 Dec 2023 05:24)      ECG: reviewed.    PHYSICAL EXAM:    GENERAL: NAD, lying in bed comfortably  HEAD:  Atraumatic, normocephalic  EYES: EOMI, PERRLA, conjunctiva and sclera clear  NECK: Supple, trachea midline, no JVD  HEART: Regular rate and rhythm, no murmurs, rubs, or gallops  LUNGS: Unlabored respirations.  Clear to auscultation bilaterally, no crackles, wheezing, or rhonchi  ABDOMEN: Soft, nontender, nondistended, +BS  EXTREMITIES: 2+ peripheral pulses bilaterally, cap refill<2 secs. No clubbing, cyanosis, or edema  NERVOUS SYSTEM:  A&Ox3, following commands, moving all extremities, no focal deficits   SKIN: No rashes or lesions    MEDICATIONS:  MEDICATIONS  (STANDING):  chlorhexidine 0.12% Liquid 15 milliLiter(s) Oral Mucosa every 12 hours  chlorhexidine 2% Cloths 1 Application(s) Topical two times a day  dexMEDEtomidine Infusion 0.7 MICROgram(s)/kG/Hr (11 mL/Hr) IV Continuous <Continuous>  dextrose 5%. 1000 milliLiter(s) (50 mL/Hr) IV Continuous <Continuous>  dextrose 5%. 1000 milliLiter(s) (30 mL/Hr) IV Continuous <Continuous>  dextrose 50% Injectable 25 Gram(s) IV Push once  glucagon  Injectable 1 milliGRAM(s) IntraMuscular once  heparin   Injectable 5000 Unit(s) SubCutaneous every 8 hours  influenza  Vaccine (HIGH DOSE) 0.7 milliLiter(s) IntraMuscular once  insulin regular Infusion 0.5 Unit(s)/Hr (0.5 mL/Hr) IV Continuous <Continuous>  norepinephrine Infusion 0.05 MICROgram(s)/kG/Min (5.81 mL/Hr) IV Continuous <Continuous>  petrolatum Ophthalmic Ointment 1 Application(s) Both EYES two times a day  piperacillin/tazobactam IVPB.. 3.375 Gram(s) IV Intermittent every 12 hours  propofol Infusion 30 MICROgram(s)/kG/Min (11.3 mL/Hr) IV Continuous <Continuous>    MEDICATIONS  (PRN):  dextrose Oral Gel 15 Gram(s) Oral once PRN Blood Glucose LESS THAN 70 milliGRAM(s)/deciliter      ALLERGIES:  Allergies    No Known Allergies    Intolerances        LABS:                        8.2    8.88  )-----------( 211      ( 15 Dec 2023 00:30 )             24.4     12-15    138  |  109<H>  |  24<H>  ----------------------------<  178<H>  4.2   |  15<L>  |  2.37<H>    Ca    9.2      15 Dec 2023 04:23  Phos  3.5     12-15  Mg     2.00     12-15    TPro  6.5  /  Alb  3.4  /  TBili  0.4  /  DBili  x   /  AST  109<H>  /  ALT  96<H>  /  AlkPhos  55  12-15    PT/INR - ( 15 Dec 2023 00:30 )   PT: 12.3 sec;   INR: 1.09 ratio         PTT - ( 13 Dec 2023 12:10 )  PTT:30.2 sec  Urinalysis Basic - ( 15 Dec 2023 04:23 )    Color: x / Appearance: x / SG: x / pH: x  Gluc: 178 mg/dL / Ketone: x  / Bili: x / Urobili: x   Blood: x / Protein: x / Nitrite: x   Leuk Esterase: x / RBC: x / WBC x   Sq Epi: x / Non Sq Epi: x / Bacteria: x      ABG:  pH, Arterial: 7.41 (12-15-23 @ 00:30)  pCO2, Arterial: 22 mmHg (12-15-23 @ 00:30)  pO2, Arterial: 186 mmHg (12-15-23 @ 00:30)      vBG:    Micro:    Culture - Blood (collected 12-13-23 @ 16:00)  Source: .Blood Blood  Preliminary Report (12-14-23 @ 22:02):    No growth at 24 hours          RADIOLOGY & ADDITIONAL TESTS: Reviewed. HPI:  Mr. Campbell is a 89yo man w/ HTN and DM who was transferred from [facility] after cardiac arrest for further care,     Pt is from the , [place] not recieve medical [place] in the [place]. He is visiting his daughter. Per his daughter, this morning he had a event of LOC that lasted a few minutes where his daughter found him on the ground, unresponsive, w/ gaze deviation and rigid. She is unsure if there was any seizure, but denies any hx of seizures or neurological issues. He regained consciousness spontaneously and was able to recognize his daughter. He was brought to [facility] where he was found to be afebrile w/ normal BP, and tachycardic w/ AFib With rates in the 140-150s. While at [place] pt denied CP, SOB, abd pain, c/d/n/v, LE swelling. While in the ED at [place] he had an acute episode of reported gaze deviation and unresponsiveness. During the episode he became hypoxic, He then lost a pulse and ACLS was started w/ ROSC after 8min with one round of epi. He was intubated and started on prop and levophed. CTH showed no bleed.     Transferred to [facility] for further cardiac eval. Cardiology here at [facility] had low suspicion for Cardiac etiology for Cardiac arrest , recommended MICU consult. EKG notable for PVCs , no ST elevation / no ST depression.        INTERVAL HPI/OVERNIGHT EVENTS: Insulin gtt continues 2.0-1.2-1.8-1.2, D5 @ 30ml/hr, lactate 0.9 from 1.3, bicarb 15    SUBJECTIVE: Patient seen and examined at bedside.     ROS: All negative except as listed above.    VITAL SIGNS:  ICU Vital Signs Last 24 Hrs  T(C): 35.8 (15 Dec 2023 00:00), Max: 37.4 (14 Dec 2023 08:00)  T(F): 96.5 (15 Dec 2023 00:00), Max: 99.3 (14 Dec 2023 08:00)  HR: 67 (15 Dec 2023 06:00) (63 - 89)  BP: --  BP(mean): --  ABP: 115/52 (15 Dec 2023 06:00) (89/54 - 167/68)  ABP(mean): 76 (15 Dec 2023 06:00) (60 - 106)  RR: 18 (15 Dec 2023 06:00) (18 - 24)  SpO2: 100% (15 Dec 2023 06:00) (98% - 100%)    O2 Parameters below as of 15 Dec 2023 06:00  Patient On (Oxygen Delivery Method): ventilator    O2 Concentration (%): 30    Mode: AC/ CMV (Assist Control/ Continuous Mandatory Ventilation), RR (machine): 18, TV (machine): 420, FiO2: 30, PEEP: 5, ITime: 0.6, MAP: 9, PIP: 20  Plateau pressure:   P/F ratio:     12-13 @ 07:01  -  12-14 @ 07:00  --------------------------------------------------------  IN: 1763.9 mL / OUT: 1970 mL / NET: -206.1 mL    12-14 @ 07:01  -  12-15 @ 06:55  --------------------------------------------------------  IN: 1952.3 mL / OUT: 2143 mL / NET: -190.7 mL      CAPILLARY BLOOD GLUCOSE  123 (14 Dec 2023 18:00)      POCT Blood Glucose.: 154 mg/dL (15 Dec 2023 05:24)      ECG: reviewed.    PHYSICAL EXAM:    GENERAL: NAD, lying in bed comfortably  HEAD:  Atraumatic, normocephalic  EYES: EOMI, PERRLA, conjunctiva and sclera clear  NECK: Supple, trachea midline, no JVD  HEART: Regular rate and rhythm, no murmurs, rubs, or gallops  LUNGS: Unlabored respirations.  Clear to auscultation bilaterally, no crackles, wheezing, or rhonchi  ABDOMEN: Soft, nontender, nondistended, +BS  EXTREMITIES: 2+ peripheral pulses bilaterally, cap refill<2 secs. No clubbing, cyanosis, or edema  NERVOUS SYSTEM:  A&Ox3, following commands, moving all extremities, no focal deficits   SKIN: No rashes or lesions    MEDICATIONS:  MEDICATIONS  (STANDING):  chlorhexidine 0.12% Liquid 15 milliLiter(s) Oral Mucosa every 12 hours  chlorhexidine 2% Cloths 1 Application(s) Topical two times a day  dexMEDEtomidine Infusion 0.7 MICROgram(s)/kG/Hr (11 mL/Hr) IV Continuous <Continuous>  dextrose 5%. 1000 milliLiter(s) (50 mL/Hr) IV Continuous <Continuous>  dextrose 5%. 1000 milliLiter(s) (30 mL/Hr) IV Continuous <Continuous>  dextrose 50% Injectable 25 Gram(s) IV Push once  glucagon  Injectable 1 milliGRAM(s) IntraMuscular once  heparin   Injectable 5000 Unit(s) SubCutaneous every 8 hours  influenza  Vaccine (HIGH DOSE) 0.7 milliLiter(s) IntraMuscular once  insulin regular Infusion 0.5 Unit(s)/Hr (0.5 mL/Hr) IV Continuous <Continuous>  norepinephrine Infusion 0.05 MICROgram(s)/kG/Min (5.81 mL/Hr) IV Continuous <Continuous>  petrolatum Ophthalmic Ointment 1 Application(s) Both EYES two times a day  piperacillin/tazobactam IVPB.. 3.375 Gram(s) IV Intermittent every 12 hours  propofol Infusion 30 MICROgram(s)/kG/Min (11.3 mL/Hr) IV Continuous <Continuous>    MEDICATIONS  (PRN):  dextrose Oral Gel 15 Gram(s) Oral once PRN Blood Glucose LESS THAN 70 milliGRAM(s)/deciliter      ALLERGIES:  Allergies    No Known Allergies    Intolerances        LABS:                        8.2    8.88  )-----------( 211      ( 15 Dec 2023 00:30 )             24.4     12-15    138  |  109<H>  |  24<H>  ----------------------------<  178<H>  4.2   |  15<L>  |  2.37<H>    Ca    9.2      15 Dec 2023 04:23  Phos  3.5     12-15  Mg     2.00     12-15    TPro  6.5  /  Alb  3.4  /  TBili  0.4  /  DBili  x   /  AST  109<H>  /  ALT  96<H>  /  AlkPhos  55  12-15    PT/INR - ( 15 Dec 2023 00:30 )   PT: 12.3 sec;   INR: 1.09 ratio         PTT - ( 13 Dec 2023 12:10 )  PTT:30.2 sec  Urinalysis Basic - ( 15 Dec 2023 04:23 )    Color: x / Appearance: x / SG: x / pH: x  Gluc: 178 mg/dL / Ketone: x  / Bili: x / Urobili: x   Blood: x / Protein: x / Nitrite: x   Leuk Esterase: x / RBC: x / WBC x   Sq Epi: x / Non Sq Epi: x / Bacteria: x      ABG:  pH, Arterial: 7.41 (12-15-23 @ 00:30)  pCO2, Arterial: 22 mmHg (12-15-23 @ 00:30)  pO2, Arterial: 186 mmHg (12-15-23 @ 00:30)      vBG:    Micro:    Culture - Blood (collected 12-13-23 @ 16:00)  Source: .Blood Blood  Preliminary Report (12-14-23 @ 22:02):    No growth at 24 hours          RADIOLOGY & ADDITIONAL TESTS: Reviewed. HPI:  Mr. Campbell is a 89yo man w/ HTN and DM who was transferred from [facility] after cardiac arrest for further care,     Pt is from the , does not recieve medical care in the [place]. He is visiting his daughter. Per his daughter, this morning he had a event of LOC that lasted a few minutes where his daughter found him on the ground, unresponsive, w/ gaze deviation and rigid. She is unsure if there was any seizure, but denies any hx of seizures or neurological issues. He regained consciousness spontaneously and was able to recognize his daughter. He was brought to [facility] where he was found to be afebrile w/ normal BP, and tachycardic w/ AFib With rates in the 140-150s. While at [place] pt denied CP, SOB, abd pain, c/d/n/v, LE swelling. While in the ED at [place] he had an acute episode of reported gaze deviation and unresponsiveness. During the episode he became hypoxic, He then lost a pulse and ACLS was started w/ ROSC after 8min with one round of epi. He was intubated and started on prop and levophed. CTH showed no bleed.     Transferred to [facility] for further cardiac eval. Cardiology here at [facility] had low suspicion for Cardiac etiology for Cardiac arrest , recommended MICU consult. EKG notable for PVCs , no ST elevation / no ST depression.        INTERVAL HPI/OVERNIGHT EVENTS: Insulin gtt increased from 0.5U to 1U/hr, Beta H trend 2.0-1.2-1.8-1.2, D5 @ 30ml/hr, lactate 0.9 from 1.3, bicarb 15    SUBJECTIVE: Patient seen and examined at bedside.     ROS: All negative except as listed above.    VITAL SIGNS:  ICU Vital Signs Last 24 Hrs  T(C): 35.8 (15 Dec 2023 00:00), Max: 37.4 (14 Dec 2023 08:00)  T(F): 96.5 (15 Dec 2023 00:00), Max: 99.3 (14 Dec 2023 08:00)  HR: 67 (15 Dec 2023 06:00) (63 - 89)  BP: --  BP(mean): --  ABP: 115/52 (15 Dec 2023 06:00) (89/54 - 167/68)  ABP(mean): 76 (15 Dec 2023 06:00) (60 - 106)  RR: 18 (15 Dec 2023 06:00) (18 - 24)  SpO2: 100% (15 Dec 2023 06:00) (98% - 100%)    O2 Parameters below as of 15 Dec 2023 06:00  Patient On (Oxygen Delivery Method): ventilator    O2 Concentration (%): 30    Mode: AC/ CMV (Assist Control/ Continuous Mandatory Ventilation), RR (machine): 18, TV (machine): 420, FiO2: 30, PEEP: 5, ITime: 0.6, MAP: 9, PIP: 20  Plateau pressure:   P/F ratio:     12-13 @ 07:01  -  12-14 @ 07:00  --------------------------------------------------------  IN: 1763.9 mL / OUT: 1970 mL / NET: -206.1 mL    12-14 @ 07:01  -  12-15 @ 06:55  --------------------------------------------------------  IN: 1952.3 mL / OUT: 2143 mL / NET: -190.7 mL      CAPILLARY BLOOD GLUCOSE  123 (14 Dec 2023 18:00)      POCT Blood Glucose.: 154 mg/dL (15 Dec 2023 05:24)      ECG: reviewed.    PHYSICAL EXAM:    GENERAL: NAD, lying in bed comfortably  HEAD:  Atraumatic, normocephalic  EYES: EOMI, PERRLA, conjunctiva and sclera clear  NECK: Supple, trachea midline, no JVD  HEART: Regular rate and rhythm, no murmurs, rubs, or gallops  LUNGS: Unlabored respirations.  Clear to auscultation bilaterally, no crackles, wheezing, or rhonchi  ABDOMEN: Soft, nontender, nondistended, +BS  EXTREMITIES: 2+ peripheral pulses bilaterally, cap refill<2 secs. No clubbing, cyanosis, or edema  NERVOUS SYSTEM:  A&Ox3, following commands, moving all extremities, no focal deficits   SKIN: No rashes or lesions    MEDICATIONS:  MEDICATIONS  (STANDING):  chlorhexidine 0.12% Liquid 15 milliLiter(s) Oral Mucosa every 12 hours  chlorhexidine 2% Cloths 1 Application(s) Topical two times a day  dexMEDEtomidine Infusion 0.7 MICROgram(s)/kG/Hr (11 mL/Hr) IV Continuous <Continuous>  dextrose 5%. 1000 milliLiter(s) (50 mL/Hr) IV Continuous <Continuous>  dextrose 5%. 1000 milliLiter(s) (30 mL/Hr) IV Continuous <Continuous>  dextrose 50% Injectable 25 Gram(s) IV Push once  glucagon  Injectable 1 milliGRAM(s) IntraMuscular once  heparin   Injectable 5000 Unit(s) SubCutaneous every 8 hours  influenza  Vaccine (HIGH DOSE) 0.7 milliLiter(s) IntraMuscular once  insulin regular Infusion 0.5 Unit(s)/Hr (0.5 mL/Hr) IV Continuous <Continuous>  norepinephrine Infusion 0.05 MICROgram(s)/kG/Min (5.81 mL/Hr) IV Continuous <Continuous>  petrolatum Ophthalmic Ointment 1 Application(s) Both EYES two times a day  piperacillin/tazobactam IVPB.. 3.375 Gram(s) IV Intermittent every 12 hours  propofol Infusion 30 MICROgram(s)/kG/Min (11.3 mL/Hr) IV Continuous <Continuous>    MEDICATIONS  (PRN):  dextrose Oral Gel 15 Gram(s) Oral once PRN Blood Glucose LESS THAN 70 milliGRAM(s)/deciliter      ALLERGIES:  Allergies    No Known Allergies    Intolerances        LABS:                        8.2    8.88  )-----------( 211      ( 15 Dec 2023 00:30 )             24.4     12-15    138  |  109<H>  |  24<H>  ----------------------------<  178<H>  4.2   |  15<L>  |  2.37<H>    Ca    9.2      15 Dec 2023 04:23  Phos  3.5     12-15  Mg     2.00     12-15    TPro  6.5  /  Alb  3.4  /  TBili  0.4  /  DBili  x   /  AST  109<H>  /  ALT  96<H>  /  AlkPhos  55  12-15    PT/INR - ( 15 Dec 2023 00:30 )   PT: 12.3 sec;   INR: 1.09 ratio         PTT - ( 13 Dec 2023 12:10 )  PTT:30.2 sec  Urinalysis Basic - ( 15 Dec 2023 04:23 )    Color: x / Appearance: x / SG: x / pH: x  Gluc: 178 mg/dL / Ketone: x  / Bili: x / Urobili: x   Blood: x / Protein: x / Nitrite: x   Leuk Esterase: x / RBC: x / WBC x   Sq Epi: x / Non Sq Epi: x / Bacteria: x      ABG:  pH, Arterial: 7.41 (12-15-23 @ 00:30)  pCO2, Arterial: 22 mmHg (12-15-23 @ 00:30)  pO2, Arterial: 186 mmHg (12-15-23 @ 00:30)      vBG:    Micro:    Culture - Blood (collected 12-13-23 @ 16:00)  Source: .Blood Blood  Preliminary Report (12-14-23 @ 22:02):    No growth at 24 hours          RADIOLOGY & ADDITIONAL TESTS: Reviewed. HPI:  Mr. Campbell is a 87yo man w/ HTN and DM who was transferred from [facility] after cardiac arrest for further care,     Pt is from the , does not recieve medical care in the [place]. He is visiting his daughter. Per his daughter, this morning he had a event of LOC that lasted a few minutes where his daughter found him on the ground, unresponsive, w/ gaze deviation and rigid. She is unsure if there was any seizure, but denies any hx of seizures or neurological issues. He regained consciousness spontaneously and was able to recognize his daughter. He was brought to [facility] where he was found to be afebrile w/ normal BP, and tachycardic w/ AFib With rates in the 140-150s. While at [place] pt denied CP, SOB, abd pain, c/d/n/v, LE swelling. While in the ED at [place] he had an acute episode of reported gaze deviation and unresponsiveness. During the episode he became hypoxic, He then lost a pulse and ACLS was started w/ ROSC after 8min with one round of epi. He was intubated and started on prop and levophed. CTH showed no bleed.     Transferred to [facility] for further cardiac eval. Cardiology here at [facility] had low suspicion for Cardiac etiology for Cardiac arrest , recommended MICU consult. EKG notable for PVCs , no ST elevation / no ST depression.        INTERVAL HPI/OVERNIGHT EVENTS: Insulin gtt increased from 0.5U to 1U/hr, Beta H trend 2.0-1.2-1.8-1.2, D5 @ 30ml/hr, lactate 0.9 from 1.3, bicarb 15    SUBJECTIVE: Patient seen and examined at bedside.     ROS: All negative except as listed above.    VITAL SIGNS:  ICU Vital Signs Last 24 Hrs  T(C): 35.8 (15 Dec 2023 00:00), Max: 37.4 (14 Dec 2023 08:00)  T(F): 96.5 (15 Dec 2023 00:00), Max: 99.3 (14 Dec 2023 08:00)  HR: 67 (15 Dec 2023 06:00) (63 - 89)  BP: --  BP(mean): --  ABP: 115/52 (15 Dec 2023 06:00) (89/54 - 167/68)  ABP(mean): 76 (15 Dec 2023 06:00) (60 - 106)  RR: 18 (15 Dec 2023 06:00) (18 - 24)  SpO2: 100% (15 Dec 2023 06:00) (98% - 100%)    O2 Parameters below as of 15 Dec 2023 06:00  Patient On (Oxygen Delivery Method): ventilator    O2 Concentration (%): 30    Mode: AC/ CMV (Assist Control/ Continuous Mandatory Ventilation), RR (machine): 18, TV (machine): 420, FiO2: 30, PEEP: 5, ITime: 0.6, MAP: 9, PIP: 20  Plateau pressure:   P/F ratio:     12-13 @ 07:01  -  12-14 @ 07:00  --------------------------------------------------------  IN: 1763.9 mL / OUT: 1970 mL / NET: -206.1 mL    12-14 @ 07:01  -  12-15 @ 06:55  --------------------------------------------------------  IN: 1952.3 mL / OUT: 2143 mL / NET: -190.7 mL      CAPILLARY BLOOD GLUCOSE  123 (14 Dec 2023 18:00)      POCT Blood Glucose.: 154 mg/dL (15 Dec 2023 05:24)      ECG: reviewed.    PHYSICAL EXAM:    GENERAL: NAD, lying in bed comfortably  HEAD:  Atraumatic, normocephalic  EYES: EOMI, PERRLA, conjunctiva and sclera clear  NECK: Supple, trachea midline, no JVD  HEART: Regular rate and rhythm, no murmurs, rubs, or gallops  LUNGS: Unlabored respirations.  Clear to auscultation bilaterally, no crackles, wheezing, or rhonchi  ABDOMEN: Soft, nontender, nondistended, +BS  EXTREMITIES: 2+ peripheral pulses bilaterally, cap refill<2 secs. No clubbing, cyanosis, or edema  NERVOUS SYSTEM:  A&Ox3, following commands, moving all extremities, no focal deficits   SKIN: No rashes or lesions    MEDICATIONS:  MEDICATIONS  (STANDING):  chlorhexidine 0.12% Liquid 15 milliLiter(s) Oral Mucosa every 12 hours  chlorhexidine 2% Cloths 1 Application(s) Topical two times a day  dexMEDEtomidine Infusion 0.7 MICROgram(s)/kG/Hr (11 mL/Hr) IV Continuous <Continuous>  dextrose 5%. 1000 milliLiter(s) (50 mL/Hr) IV Continuous <Continuous>  dextrose 5%. 1000 milliLiter(s) (30 mL/Hr) IV Continuous <Continuous>  dextrose 50% Injectable 25 Gram(s) IV Push once  glucagon  Injectable 1 milliGRAM(s) IntraMuscular once  heparin   Injectable 5000 Unit(s) SubCutaneous every 8 hours  influenza  Vaccine (HIGH DOSE) 0.7 milliLiter(s) IntraMuscular once  insulin regular Infusion 0.5 Unit(s)/Hr (0.5 mL/Hr) IV Continuous <Continuous>  norepinephrine Infusion 0.05 MICROgram(s)/kG/Min (5.81 mL/Hr) IV Continuous <Continuous>  petrolatum Ophthalmic Ointment 1 Application(s) Both EYES two times a day  piperacillin/tazobactam IVPB.. 3.375 Gram(s) IV Intermittent every 12 hours  propofol Infusion 30 MICROgram(s)/kG/Min (11.3 mL/Hr) IV Continuous <Continuous>    MEDICATIONS  (PRN):  dextrose Oral Gel 15 Gram(s) Oral once PRN Blood Glucose LESS THAN 70 milliGRAM(s)/deciliter      ALLERGIES:  Allergies    No Known Allergies    Intolerances        LABS:                        8.2    8.88  )-----------( 211      ( 15 Dec 2023 00:30 )             24.4     12-15    138  |  109<H>  |  24<H>  ----------------------------<  178<H>  4.2   |  15<L>  |  2.37<H>    Ca    9.2      15 Dec 2023 04:23  Phos  3.5     12-15  Mg     2.00     12-15    TPro  6.5  /  Alb  3.4  /  TBili  0.4  /  DBili  x   /  AST  109<H>  /  ALT  96<H>  /  AlkPhos  55  12-15    PT/INR - ( 15 Dec 2023 00:30 )   PT: 12.3 sec;   INR: 1.09 ratio         PTT - ( 13 Dec 2023 12:10 )  PTT:30.2 sec  Urinalysis Basic - ( 15 Dec 2023 04:23 )    Color: x / Appearance: x / SG: x / pH: x  Gluc: 178 mg/dL / Ketone: x  / Bili: x / Urobili: x   Blood: x / Protein: x / Nitrite: x   Leuk Esterase: x / RBC: x / WBC x   Sq Epi: x / Non Sq Epi: x / Bacteria: x      ABG:  pH, Arterial: 7.41 (12-15-23 @ 00:30)  pCO2, Arterial: 22 mmHg (12-15-23 @ 00:30)  pO2, Arterial: 186 mmHg (12-15-23 @ 00:30)      vBG:    Micro:    Culture - Blood (collected 12-13-23 @ 16:00)  Source: .Blood Blood  Preliminary Report (12-14-23 @ 22:02):    No growth at 24 hours          RADIOLOGY & ADDITIONAL TESTS: Reviewed. HPI:  Mr. Campbell is a 87yo man w/ HTN and DM who was transferred from [facility] after cardiac arrest for further care,     Pt is from the , [place] not recieve medical [place] in the [place]. He is visiting his daughter. Per his daughter, this morning he had a event of LOC that lasted a few minutes where his daughter found him on the ground, unresponsive, w/ gaze deviation and rigid. She is unsure if there was any seizure, but denies any hx of seizures or neurological issues. He regained consciousness spontaneously and was able to recognize his daughter. He was brought to [facility] where he was found to be afebrile w/ normal BP, and tachycardic w/ AFib With rates in the 140-150s. While at [place] pt denied CP, SOB, abd pain, c/d/n/v, LE swelling. While in the ED at [place] he had an acute episode of reported gaze deviation and unresponsiveness. During the episode he became hypoxic, He then lost a pulse and ACLS was started w/ ROSC after 8min with one round of epi. He was intubated and started on prop and levophed. CTH showed no bleed.     Transferred to [facility] for further cardiac eval. Cardiology here at [facility] had low suspicion for Cardiac etiology for Cardiac arrest , recommended MICU consult. EKG notable for PVCs , no ST elevation / no ST depression.        INTERVAL HPI/OVERNIGHT EVENTS: Insulin gtt increased from 0.5U to 1U/hr, Beta H increased, D5 @ 30ml/hr, lactate 0.9 from 1.3, bicarb 15    SUBJECTIVE: Patient seen and examined at bedside.     ROS: intubated and sedated     VITAL SIGNS:  ICU Vital Signs Last 24 Hrs  T(C): 35.8 (15 Dec 2023 00:00), Max: 37.4 (14 Dec 2023 08:00)  T(F): 96.5 (15 Dec 2023 00:00), Max: 99.3 (14 Dec 2023 08:00)  HR: 67 (15 Dec 2023 06:00) (63 - 89)  BP: --  BP(mean): --  ABP: 115/52 (15 Dec 2023 06:00) (89/54 - 167/68)  ABP(mean): 76 (15 Dec 2023 06:00) (60 - 106)  RR: 18 (15 Dec 2023 06:00) (18 - 24)  SpO2: 100% (15 Dec 2023 06:00) (98% - 100%)    O2 Parameters below as of 15 Dec 2023 06:00  Patient On (Oxygen Delivery Method): ventilator    O2 Concentration (%): 30    Mode: AC/ CMV (Assist Control/ Continuous Mandatory Ventilation), RR (machine): 18, TV (machine): 420, FiO2: 30, PEEP: 5, ITime: 0.6, MAP: 9, PIP: 20  Plateau pressure:   P/F ratio:     12-13 @ 07:01  -  12-14 @ 07:00  --------------------------------------------------------  IN: 1763.9 mL / OUT: 1970 mL / NET: -206.1 mL    12-14 @ 07:01  -  12-15 @ 06:55  --------------------------------------------------------  IN: 1952.3 mL / OUT: 2143 mL / NET: -190.7 mL      CAPILLARY BLOOD GLUCOSE  123 (14 Dec 2023 18:00)      POCT Blood Glucose.: 154 mg/dL (15 Dec 2023 05:24)      ECG: reviewed.    PHYSICAL EXAM:    GENERAL: NAD, lying in bed comfortably  HEAD:  Atraumatic, normocephalic  EYES: EOMI, PERRLA, conjunctiva and sclera clear  NECK: Supple, trachea midline, no JVD  HEART: Regular rate and rhythm, no murmurs, rubs, or gallops  LUNGS: Unlabored respirations.  Clear to auscultation bilaterally, no crackles, wheezing, or rhonchi  ABDOMEN: Soft, nontender, nondistended, +BS  EXTREMITIES: 2+ peripheral pulses bilaterally, cap refill<2 secs. No clubbing, cyanosis, or edema  NERVOUS SYSTEM: intubated and sedated  SKIN: No rashes or lesions    MEDICATIONS:  MEDICATIONS  (STANDING):  chlorhexidine 0.12% Liquid 15 milliLiter(s) Oral Mucosa every 12 hours  chlorhexidine 2% Cloths 1 Application(s) Topical two times a day  dexMEDEtomidine Infusion 0.7 MICROgram(s)/kG/Hr (11 mL/Hr) IV Continuous <Continuous>  dextrose 5%. 1000 milliLiter(s) (50 mL/Hr) IV Continuous <Continuous>  dextrose 5%. 1000 milliLiter(s) (30 mL/Hr) IV Continuous <Continuous>  dextrose 50% Injectable 25 Gram(s) IV Push once  glucagon  Injectable 1 milliGRAM(s) IntraMuscular once  heparin   Injectable 5000 Unit(s) SubCutaneous every 8 hours  influenza  Vaccine (HIGH DOSE) 0.7 milliLiter(s) IntraMuscular once  insulin regular Infusion 0.5 Unit(s)/Hr (0.5 mL/Hr) IV Continuous <Continuous>  norepinephrine Infusion 0.05 MICROgram(s)/kG/Min (5.81 mL/Hr) IV Continuous <Continuous>  petrolatum Ophthalmic Ointment 1 Application(s) Both EYES two times a day  piperacillin/tazobactam IVPB.. 3.375 Gram(s) IV Intermittent every 12 hours  propofol Infusion 30 MICROgram(s)/kG/Min (11.3 mL/Hr) IV Continuous <Continuous>    MEDICATIONS  (PRN):  dextrose Oral Gel 15 Gram(s) Oral once PRN Blood Glucose LESS THAN 70 milliGRAM(s)/deciliter      ALLERGIES:  Allergies    No Known Allergies    Intolerances        LABS:                        8.2    8.88  )-----------( 211      ( 15 Dec 2023 00:30 )             24.4     12-15    138  |  109<H>  |  24<H>  ----------------------------<  178<H>  4.2   |  15<L>  |  2.37<H>    Ca    9.2      15 Dec 2023 04:23  Phos  3.5     12-15  Mg     2.00     12-15    TPro  6.5  /  Alb  3.4  /  TBili  0.4  /  DBili  x   /  AST  109<H>  /  ALT  96<H>  /  AlkPhos  55  12-15    PT/INR - ( 15 Dec 2023 00:30 )   PT: 12.3 sec;   INR: 1.09 ratio         PTT - ( 13 Dec 2023 12:10 )  PTT:30.2 sec  Urinalysis Basic - ( 15 Dec 2023 04:23 )    Color: x / Appearance: x / SG: x / pH: x  Gluc: 178 mg/dL / Ketone: x  / Bili: x / Urobili: x   Blood: x / Protein: x / Nitrite: x   Leuk Esterase: x / RBC: x / WBC x   Sq Epi: x / Non Sq Epi: x / Bacteria: x      ABG:  pH, Arterial: 7.41 (12-15-23 @ 00:30)  pCO2, Arterial: 22 mmHg (12-15-23 @ 00:30)  pO2, Arterial: 186 mmHg (12-15-23 @ 00:30)      vBG:    Micro:    Culture - Blood (collected 12-13-23 @ 16:00)  Source: .Blood Blood  Preliminary Report (12-14-23 @ 22:02):    No growth at 24 hours          RADIOLOGY & ADDITIONAL TESTS: Reviewed. HPI:  Mr. Campbell is a 89yo man w/ HTN and DM who was transferred from [facility] after cardiac arrest for further care,     [place] is from the , [place] recieve medical care in the [place]. He is visiting his daughter. Per his daughter, this morning he had a event of LOC that lasted a few minutes where his daughter found him on the ground, unresponsive, w/ gaze deviation and rigid. She is unsure if there was any seizure, but denies any hx of seizures or neurological issues. He regained consciousness spontaneously and was able to recognize his daughter. He was brought to [facility] where he was found to be afebrile w/ normal BP, and tachycardic w/ AFib With rates in the 140-150s. While at [place] pt denied CP, SOB, abd pain, c/d/n/v, LE swelling. While in the ED at [place] he had an acute episode of reported gaze deviation and unresponsiveness. During the episode he became hypoxic, He then lost a pulse and ACLS was started w/ ROSC after 8min with one round of epi. He was intubated and started on prop and levophed. CTH showed no bleed.     Transferred to [facility] for further cardiac eval. Cardiology here at [facility] had low suspicion for Cardiac etiology for Cardiac arrest , recommended MICU consult. EKG notable for PVCs , no ST elevation / no ST depression.        INTERVAL HPI/OVERNIGHT EVENTS: Insulin gtt increased from 0.5U to 1U/hr, Beta H increased, D5 @ 30ml/hr, lactate 0.9 from 1.3, bicarb 15    SUBJECTIVE: Patient seen and examined at bedside.     ROS: intubated and sedated     VITAL SIGNS:  ICU Vital Signs Last 24 Hrs  T(C): 35.8 (15 Dec 2023 00:00), Max: 37.4 (14 Dec 2023 08:00)  T(F): 96.5 (15 Dec 2023 00:00), Max: 99.3 (14 Dec 2023 08:00)  HR: 67 (15 Dec 2023 06:00) (63 - 89)  BP: --  BP(mean): --  ABP: 115/52 (15 Dec 2023 06:00) (89/54 - 167/68)  ABP(mean): 76 (15 Dec 2023 06:00) (60 - 106)  RR: 18 (15 Dec 2023 06:00) (18 - 24)  SpO2: 100% (15 Dec 2023 06:00) (98% - 100%)    O2 Parameters below as of 15 Dec 2023 06:00  Patient On (Oxygen Delivery Method): ventilator    O2 Concentration (%): 30    Mode: AC/ CMV (Assist Control/ Continuous Mandatory Ventilation), RR (machine): 18, TV (machine): 420, FiO2: 30, PEEP: 5, ITime: 0.6, MAP: 9, PIP: 20  Plateau pressure:   P/F ratio:     12-13 @ 07:01  -  12-14 @ 07:00  --------------------------------------------------------  IN: 1763.9 mL / OUT: 1970 mL / NET: -206.1 mL    12-14 @ 07:01  -  12-15 @ 06:55  --------------------------------------------------------  IN: 1952.3 mL / OUT: 2143 mL / NET: -190.7 mL      CAPILLARY BLOOD GLUCOSE  123 (14 Dec 2023 18:00)      POCT Blood Glucose.: 154 mg/dL (15 Dec 2023 05:24)      ECG: reviewed.    PHYSICAL EXAM:    GENERAL: NAD, lying in bed comfortably  HEAD:  Atraumatic, normocephalic  EYES: EOMI, PERRLA, conjunctiva and sclera clear  NECK: Supple, trachea midline, no JVD  HEART: Regular rate and rhythm, no murmurs, rubs, or gallops  LUNGS: Unlabored respirations.  Clear to auscultation bilaterally, no crackles, wheezing, or rhonchi  ABDOMEN: Soft, nontender, nondistended, +BS  EXTREMITIES: 2+ peripheral pulses bilaterally, cap refill<2 secs. No clubbing, cyanosis, or edema  NERVOUS SYSTEM: intubated and sedated  SKIN: No rashes or lesions    MEDICATIONS:  MEDICATIONS  (STANDING):  chlorhexidine 0.12% Liquid 15 milliLiter(s) Oral Mucosa every 12 hours  chlorhexidine 2% Cloths 1 Application(s) Topical two times a day  dexMEDEtomidine Infusion 0.7 MICROgram(s)/kG/Hr (11 mL/Hr) IV Continuous <Continuous>  dextrose 5%. 1000 milliLiter(s) (50 mL/Hr) IV Continuous <Continuous>  dextrose 5%. 1000 milliLiter(s) (30 mL/Hr) IV Continuous <Continuous>  dextrose 50% Injectable 25 Gram(s) IV Push once  glucagon  Injectable 1 milliGRAM(s) IntraMuscular once  heparin   Injectable 5000 Unit(s) SubCutaneous every 8 hours  influenza  Vaccine (HIGH DOSE) 0.7 milliLiter(s) IntraMuscular once  insulin regular Infusion 0.5 Unit(s)/Hr (0.5 mL/Hr) IV Continuous <Continuous>  norepinephrine Infusion 0.05 MICROgram(s)/kG/Min (5.81 mL/Hr) IV Continuous <Continuous>  petrolatum Ophthalmic Ointment 1 Application(s) Both EYES two times a day  piperacillin/tazobactam IVPB.. 3.375 Gram(s) IV Intermittent every 12 hours  propofol Infusion 30 MICROgram(s)/kG/Min (11.3 mL/Hr) IV Continuous <Continuous>    MEDICATIONS  (PRN):  dextrose Oral Gel 15 Gram(s) Oral once PRN Blood Glucose LESS THAN 70 milliGRAM(s)/deciliter      ALLERGIES:  Allergies    No Known Allergies    Intolerances        LABS:                        8.2    8.88  )-----------( 211      ( 15 Dec 2023 00:30 )             24.4     12-15    138  |  109<H>  |  24<H>  ----------------------------<  178<H>  4.2   |  15<L>  |  2.37<H>    Ca    9.2      15 Dec 2023 04:23  Phos  3.5     12-15  Mg     2.00     12-15    TPro  6.5  /  Alb  3.4  /  TBili  0.4  /  DBili  x   /  AST  109<H>  /  ALT  96<H>  /  AlkPhos  55  12-15    PT/INR - ( 15 Dec 2023 00:30 )   PT: 12.3 sec;   INR: 1.09 ratio         PTT - ( 13 Dec 2023 12:10 )  PTT:30.2 sec  Urinalysis Basic - ( 15 Dec 2023 04:23 )    Color: x / Appearance: x / SG: x / pH: x  Gluc: 178 mg/dL / Ketone: x  / Bili: x / Urobili: x   Blood: x / Protein: x / Nitrite: x   Leuk Esterase: x / RBC: x / WBC x   Sq Epi: x / Non Sq Epi: x / Bacteria: x      ABG:  pH, Arterial: 7.41 (12-15-23 @ 00:30)  pCO2, Arterial: 22 mmHg (12-15-23 @ 00:30)  pO2, Arterial: 186 mmHg (12-15-23 @ 00:30)      vBG:    Micro:    Culture - Blood (collected 12-13-23 @ 16:00)  Source: .Blood Blood  Preliminary Report (12-14-23 @ 22:02):    No growth at 24 hours          RADIOLOGY & ADDITIONAL TESTS: Reviewed. HPI:  Mr. Campbell is a 87yo man w/ HTN and DM who was transferred from [facility] after cardiac arrest for further care,     Pt is from the , [place] not receive medical [place] in the [place]. He is visiting his daughter. Per his daughter, this morning he had a event of LOC that lasted a few minutes where his daughter found him on the ground, unresponsive, w/ gaze deviation and rigid. She is unsure if there was any seizure, but denies any hx of seizures or neurological issues. He regained consciousness spontaneously and was able to recognize his daughter. He was brought to [facility] where he was found to be afebrile w/ normal BP, and tachycardic w/ AFib With rates in the 140-150s. While at [place] pt denied CP, SOB, abd pain, c/d/n/v, LE swelling. While in the ED at [place] he had an acute episode of reported gaze deviation and unresponsiveness. During the episode he became hypoxic, He then lost a pulse and ACLS was started w/ ROSC after 8min with one round of epi. He was intubated and started on prop and levophed. CTH showed no bleed.     Transferred to [facility] for further cardiac eval. Cardiology here at [facility] had low suspicion for Cardiac etiology for Cardiac arrest , recommended MICU consult. EKG notable for PVCs , no ST elevation / no ST depression.        INTERVAL HPI/OVERNIGHT EVENTS: Insulin gtt increased from 0.5U to 1U/hr, Beta H increased, D5 @ 30ml/hr, lactate 0.9 from 1.3, bicarb 15    SUBJECTIVE: Patient seen and examined at bedside.     ROS: intubated and sedated     VITAL SIGNS:  ICU Vital Signs Last 24 Hrs  T(C): 35.8 (15 Dec 2023 00:00), Max: 37.4 (14 Dec 2023 08:00)  T(F): 96.5 (15 Dec 2023 00:00), Max: 99.3 (14 Dec 2023 08:00)  HR: 67 (15 Dec 2023 06:00) (63 - 89)  BP: --  BP(mean): --  ABP: 115/52 (15 Dec 2023 06:00) (89/54 - 167/68)  ABP(mean): 76 (15 Dec 2023 06:00) (60 - 106)  RR: 18 (15 Dec 2023 06:00) (18 - 24)  SpO2: 100% (15 Dec 2023 06:00) (98% - 100%)    O2 Parameters below as of 15 Dec 2023 06:00  Patient On (Oxygen Delivery Method): ventilator    O2 Concentration (%): 30    Mode: AC/ CMV (Assist Control/ Continuous Mandatory Ventilation), RR (machine): 18, TV (machine): 420, FiO2: 30, PEEP: 5, ITime: 0.6, MAP: 9, PIP: 20  Plateau pressure:   P/F ratio:     12-13 @ 07:01  -  12-14 @ 07:00  --------------------------------------------------------  IN: 1763.9 mL / OUT: 1970 mL / NET: -206.1 mL    12-14 @ 07:01  -  12-15 @ 06:55  --------------------------------------------------------  IN: 1952.3 mL / OUT: 2143 mL / NET: -190.7 mL      CAPILLARY BLOOD GLUCOSE  123 (14 Dec 2023 18:00)      POCT Blood Glucose.: 154 mg/dL (15 Dec 2023 05:24)      ECG: reviewed.    PHYSICAL EXAM:    GENERAL: NAD, lying in bed comfortably  HEAD:  Atraumatic, normocephalic  EYES: EOMI, PERRLA, conjunctiva and sclera clear  NECK: Supple, trachea midline, no JVD  HEART: Regular rate and rhythm, no murmurs, rubs, or gallops  LUNGS: Unlabored respirations.  Clear to auscultation bilaterally, no crackles, wheezing, or rhonchi  ABDOMEN: Soft, nontender, nondistended, +BS  EXTREMITIES: 2+ peripheral pulses bilaterally, cap refill<2 secs. No clubbing, cyanosis, or edema  NERVOUS SYSTEM: intubated and sedated  SKIN: No rashes or lesions    MEDICATIONS:  MEDICATIONS  (STANDING):  chlorhexidine 0.12% Liquid 15 milliLiter(s) Oral Mucosa every 12 hours  chlorhexidine 2% Cloths 1 Application(s) Topical two times a day  dexMEDEtomidine Infusion 0.7 MICROgram(s)/kG/Hr (11 mL/Hr) IV Continuous <Continuous>  dextrose 5%. 1000 milliLiter(s) (50 mL/Hr) IV Continuous <Continuous>  dextrose 5%. 1000 milliLiter(s) (30 mL/Hr) IV Continuous <Continuous>  dextrose 50% Injectable 25 Gram(s) IV Push once  glucagon  Injectable 1 milliGRAM(s) IntraMuscular once  heparin   Injectable 5000 Unit(s) SubCutaneous every 8 hours  influenza  Vaccine (HIGH DOSE) 0.7 milliLiter(s) IntraMuscular once  insulin regular Infusion 0.5 Unit(s)/Hr (0.5 mL/Hr) IV Continuous <Continuous>  norepinephrine Infusion 0.05 MICROgram(s)/kG/Min (5.81 mL/Hr) IV Continuous <Continuous>  petrolatum Ophthalmic Ointment 1 Application(s) Both EYES two times a day  piperacillin/tazobactam IVPB.. 3.375 Gram(s) IV Intermittent every 12 hours  propofol Infusion 30 MICROgram(s)/kG/Min (11.3 mL/Hr) IV Continuous <Continuous>    MEDICATIONS  (PRN):  dextrose Oral Gel 15 Gram(s) Oral once PRN Blood Glucose LESS THAN 70 milliGRAM(s)/deciliter      ALLERGIES:  Allergies    No Known Allergies    Intolerances        LABS:                        8.2    8.88  )-----------( 211      ( 15 Dec 2023 00:30 )             24.4     12-15    138  |  109<H>  |  24<H>  ----------------------------<  178<H>  4.2   |  15<L>  |  2.37<H>    Ca    9.2      15 Dec 2023 04:23  Phos  3.5     12-15  Mg     2.00     12-15    TPro  6.5  /  Alb  3.4  /  TBili  0.4  /  DBili  x   /  AST  109<H>  /  ALT  96<H>  /  AlkPhos  55  12-15    PT/INR - ( 15 Dec 2023 00:30 )   PT: 12.3 sec;   INR: 1.09 ratio         PTT - ( 13 Dec 2023 12:10 )  PTT:30.2 sec  Urinalysis Basic - ( 15 Dec 2023 04:23 )    Color: x / Appearance: x / SG: x / pH: x  Gluc: 178 mg/dL / Ketone: x  / Bili: x / Urobili: x   Blood: x / Protein: x / Nitrite: x   Leuk Esterase: x / RBC: x / WBC x   Sq Epi: x / Non Sq Epi: x / Bacteria: x      ABG:  pH, Arterial: 7.41 (12-15-23 @ 00:30)  pCO2, Arterial: 22 mmHg (12-15-23 @ 00:30)  pO2, Arterial: 186 mmHg (12-15-23 @ 00:30)      vBG:    Micro:    Culture - Blood (collected 12-13-23 @ 16:00)  Source: .Blood Blood  Preliminary Report (12-14-23 @ 22:02):    No growth at 24 hours          RADIOLOGY & ADDITIONAL TESTS: Reviewed. HPI:  Mr. Campbell is a 87yo man w/ HTN and DM who was transferred from [facility] after cardiac arrest for further care,     Pt is from the , [place] not receive medical [place] in the [place]. He is visiting his daughter. Per his daughter, this morning he had a event of LOC that lasted a few minutes where his daughter found him on the ground, unresponsive, w/ gaze deviation and rigid. She is unsure if there was any seizure, but denies any hx of seizures or neurological issues. He regained consciousness spontaneously and was able to recognize his daughter. He was brought to [facility] where he was found to be afebrile w/ normal BP, and tachycardic w/ AFib With rates in the 140-150s. While at [place] pt denied CP, SOB, abd pain, c/d/n/v, LE swelling. While in the ED at [place] he had an acute episode of reported gaze deviation and unresponsiveness. During the episode he became hypoxic, He then lost a pulse and ACLS was started w/ ROSC after 8min with one round of epi. He was intubated and started on prop and levophed. CTH showed no bleed.     Transferred to [facility] for further cardiac eval. Cardiology here at [facility] had low suspicion for Cardiac etiology for Cardiac arrest , recommended MICU consult. EKG notable for PVCs , no ST elevation / no ST depression.        INTERVAL HPI/OVERNIGHT EVENTS: Insulin gtt increased from 0.5U to 1U/hr, Beta H increased, D5 @ 30ml/hr, lactate 0.9 from 1.3, bicarb 15    SUBJECTIVE: Patient seen and examined at bedside.     ROS: intubated and sedated     VITAL SIGNS:  ICU Vital Signs Last 24 Hrs  T(C): 35.8 (15 Dec 2023 00:00), Max: 37.4 (14 Dec 2023 08:00)  T(F): 96.5 (15 Dec 2023 00:00), Max: 99.3 (14 Dec 2023 08:00)  HR: 67 (15 Dec 2023 06:00) (63 - 89)  BP: --  BP(mean): --  ABP: 115/52 (15 Dec 2023 06:00) (89/54 - 167/68)  ABP(mean): 76 (15 Dec 2023 06:00) (60 - 106)  RR: 18 (15 Dec 2023 06:00) (18 - 24)  SpO2: 100% (15 Dec 2023 06:00) (98% - 100%)    O2 Parameters below as of 15 Dec 2023 06:00  Patient On (Oxygen Delivery Method): ventilator    O2 Concentration (%): 30    Mode: AC/ CMV (Assist Control/ Continuous Mandatory Ventilation), RR (machine): 18, TV (machine): 420, FiO2: 30, PEEP: 5, ITime: 0.6, MAP: 9, PIP: 20  Plateau pressure:   P/F ratio:     12-13 @ 07:01  -  12-14 @ 07:00  --------------------------------------------------------  IN: 1763.9 mL / OUT: 1970 mL / NET: -206.1 mL    12-14 @ 07:01  -  12-15 @ 06:55  --------------------------------------------------------  IN: 1952.3 mL / OUT: 2143 mL / NET: -190.7 mL      CAPILLARY BLOOD GLUCOSE  123 (14 Dec 2023 18:00)      POCT Blood Glucose.: 154 mg/dL (15 Dec 2023 05:24)      ECG: reviewed.    PHYSICAL EXAM:    GENERAL: NAD, lying in bed comfortably  HEAD:  Atraumatic, normocephalic  EYES: EOMI, PERRLA, conjunctiva and sclera clear  NECK: Supple, trachea midline, no JVD  HEART: Regular rate and rhythm, no murmurs, rubs, or gallops  LUNGS: Unlabored respirations.  Clear to auscultation bilaterally, no crackles, wheezing, or rhonchi  ABDOMEN: Soft, nontender, nondistended, +BS  EXTREMITIES: 2+ peripheral pulses bilaterally, cap refill<2 secs. No clubbing, cyanosis, or edema  NERVOUS SYSTEM: intubated and sedated  SKIN: No rashes or lesions    MEDICATIONS:  MEDICATIONS  (STANDING):  chlorhexidine 0.12% Liquid 15 milliLiter(s) Oral Mucosa every 12 hours  chlorhexidine 2% Cloths 1 Application(s) Topical two times a day  dexMEDEtomidine Infusion 0.7 MICROgram(s)/kG/Hr (11 mL/Hr) IV Continuous <Continuous>  dextrose 5%. 1000 milliLiter(s) (50 mL/Hr) IV Continuous <Continuous>  dextrose 5%. 1000 milliLiter(s) (30 mL/Hr) IV Continuous <Continuous>  dextrose 50% Injectable 25 Gram(s) IV Push once  glucagon  Injectable 1 milliGRAM(s) IntraMuscular once  heparin   Injectable 5000 Unit(s) SubCutaneous every 8 hours  influenza  Vaccine (HIGH DOSE) 0.7 milliLiter(s) IntraMuscular once  insulin regular Infusion 0.5 Unit(s)/Hr (0.5 mL/Hr) IV Continuous <Continuous>  norepinephrine Infusion 0.05 MICROgram(s)/kG/Min (5.81 mL/Hr) IV Continuous <Continuous>  petrolatum Ophthalmic Ointment 1 Application(s) Both EYES two times a day  piperacillin/tazobactam IVPB.. 3.375 Gram(s) IV Intermittent every 12 hours  propofol Infusion 30 MICROgram(s)/kG/Min (11.3 mL/Hr) IV Continuous <Continuous>    MEDICATIONS  (PRN):  dextrose Oral Gel 15 Gram(s) Oral once PRN Blood Glucose LESS THAN 70 milliGRAM(s)/deciliter      ALLERGIES:  Allergies    No Known Allergies    Intolerances        LABS:                        8.2    8.88  )-----------( 211      ( 15 Dec 2023 00:30 )             24.4     12-15    138  |  109<H>  |  24<H>  ----------------------------<  178<H>  4.2   |  15<L>  |  2.37<H>    Ca    9.2      15 Dec 2023 04:23  Phos  3.5     12-15  Mg     2.00     12-15    TPro  6.5  /  Alb  3.4  /  TBili  0.4  /  DBili  x   /  AST  109<H>  /  ALT  96<H>  /  AlkPhos  55  12-15    PT/INR - ( 15 Dec 2023 00:30 )   PT: 12.3 sec;   INR: 1.09 ratio         PTT - ( 13 Dec 2023 12:10 )  PTT:30.2 sec  Urinalysis Basic - ( 15 Dec 2023 04:23 )    Color: x / Appearance: x / SG: x / pH: x  Gluc: 178 mg/dL / Ketone: x  / Bili: x / Urobili: x   Blood: x / Protein: x / Nitrite: x   Leuk Esterase: x / RBC: x / WBC x   Sq Epi: x / Non Sq Epi: x / Bacteria: x      ABG:  pH, Arterial: 7.41 (12-15-23 @ 00:30)  pCO2, Arterial: 22 mmHg (12-15-23 @ 00:30)  pO2, Arterial: 186 mmHg (12-15-23 @ 00:30)      vBG:    Micro:    Culture - Blood (collected 12-13-23 @ 16:00)  Source: .Blood Blood  Preliminary Report (12-14-23 @ 22:02):    No growth at 24 hours          RADIOLOGY & ADDITIONAL TESTS: Reviewed.

## 2023-12-15 NOTE — PROGRESS NOTE ADULT - ASSESSMENT
Mr. Campbell is a 89yo man w/ HTN and DM who presented to [facility] after episode of LOC found to be in AFib  c/b bradycardic/PEA arrest w/ ROSC after 8 minutes with 1 epi,  now transferred to [facility] for cardiac eval. Low suspicion for cardiac etiology for cardiac arrest. Patient transferred to MICU for higher level of care.       Plan:    =======Neuro=======   #intubated/sedated:  -currently intubated on RR18, FIO2:30   - sedated on propofol @ 25wean prop  - precedex added @ 0.7  - EEG prelim report negative for seizure  - CTH w/o acute findings cont w/EEG r/o seizures    =======Resp=======  # Asp Pnu   - intubated vol A/C 18/420/5/30%  - monitor ABG   - proph Zosyn     =======CV=======  #HTN  #cardiac arrest  # AS   # Shock likely Vasoplegic sec to sedation  [ ] TTE severe aortic stenosis, EF 56%  [ ] 3904 BNP from 1732  [ ] LE doppler negative DVT  [ ] on levophed @ 0.01 wean as tolerated  - Hold home HTN medications   - isch w/u when medically stable/extubated    =======GI=======  #No acute issue   # Mild Transaminitis post arrest  - started TF Glucerna 1.5  - monitor LFTs     =======Renal=======  #ALEXI 2/2 shock   - U/S left lower renal lesion. Asymmetrically atrophic right kidney.  - BUN/Cr 24/2.37 (unclear baseline), cont to monitor bicarb   - Avoid nephrotoxin agents   - Renally dose meds/Avoid nephrotoxin agents   - Cedillo placed, monitor I/O & electrolytes     =======Endo=======  #T2DM   # Euglycemic DKA ?  - Beta hydroxybuterate uptrend from 0.4 to 2.0, trend  - cont Insulin Gtt @ 0.5 U of Lantus/ISS, cont to monitor FS q1h + D5@ 30ml/hr  - NPO w/ TF   - A1c 5.6  - Hold home SGLT-2     =======ID=======  #No acute issues   - Empiric Zosyn   [ ] bcx results pending  [ ] ua negative     =======Heme=======  - SQH, SCD    =======Ethics=======   full code  Cont GOC    Mr. Campbell is a 89yo man w/ HTN and DM who presented to [facility] after episode of LOC found to be in AFib  c/b bradycardic/PEA arrest w/ ROSC after 8 minutes with 1 epi,  now transferred to [facility] for cardiac eval. Low suspicion for cardiac etiology for cardiac arrest. Patient transferred to MICU for higher level of care.       Plan:    =======Neuro=======   #intubated/sedated:  -currently intubated on RR18, FIO2:30   - sedated on propofol @ 25wean prop  - precedex added @ 0.7  - EEG prelim report negative for seizure  - CTH w/o acute findings cont w/EEG r/o seizures    =======Resp=======  # Asp Pnu   - intubated vol A/C 18/420/5/30%  - monitor ABG   - proph Zosyn     =======CV=======  #HTN  #cardiac arrest  # AS   # Shock likely Vasoplegic sec to sedation  [ ] TTE severe aortic stenosis, EF 56%  [ ] 3904 BNP from 1732  [ ] LE doppler negative DVT  [ ] on levophed @ 0.01 wean as tolerated  - Hold home HTN medications   - isch w/u when medically stable/extubated    =======GI=======  #No acute issue   # Mild Transaminitis post arrest  - started TF Glucerna 1.5  - monitor LFTs     =======Renal=======  #ALEXI 2/2 shock   - U/S left lower renal lesion. Asymmetrically atrophic right kidney.  - BUN/Cr 24/2.37 (unclear baseline), cont to monitor bicarb   - Avoid nephrotoxin agents   - Renally dose meds/Avoid nephrotoxin agents   - Cedillo placed, monitor I/O & electrolytes     =======Endo=======  #T2DM   # Euglycemic DKA ?  - Beta hydroxybuterate uptrend from 0.4 to 2.0, trend  - cont Insulin Gtt @ 0.5 U of Lantus/ISS, cont to monitor FS q1h + D5@ 30ml/hr  - NPO w/ TF   - A1c 5.6  - Hold home SGLT-2     =======ID=======  #No acute issues   - Empiric Zosyn   [ ] bcx results pending  [ ] ua negative     =======Heme=======  - SQH, SCD    =======Ethics=======   full code  Cont GOC    Mr. Campbell is a 87yo man w/ HTN and DM who presented to [facility] after episode of LOC found to be in AFib  c/b bradycardic/PEA arrest w/ ROSC after 8 minutes with 1 epi,  now transferred to [facility] for cardiac eval. Low suspicion for cardiac etiology for cardiac arrest. Patient transferred to MICU for higher level of care.       Plan:    =======Neuro=======   #intubated/sedated:  -currently intubated on RR18, FIO2:30   - sedated on propofol @ 20wean prop  - precedex added @ 0.7  - EEG prelim report negative for seizure  - CTH w/o acute findings cont w/EEG r/o seizures    =======Resp=======  # Asp Pnu   - intubated vol A/C 18/420/5/30%  - monitor ABG   - proph Zosyn     =======CV=======  #HTN  #cardiac arrest  # AS   # Shock likely Vasoplegic sec to sedation  [ ] TTE severe aortic stenosis, EF 56%  [ ] 3904 BNP from 1732  [ ] LE doppler negative DVT  [ ] on levophed @ 0.01 wean as tolerated  - Hold home HTN medications   - isch w/u when medically stable/extubated    =======GI=======  #No acute issue   # Mild Transaminitis post arrest  - started TF Glucerna 1.5  - monitor LFTs     =======Renal=======  #ALEXI 2/2 shock   - U/S left lower renal lesion. Asymmetrically atrophic right kidney.  - BUN/Cr 24/2.37 (unclear baseline), cont to monitor bicarb   - Avoid nephrotoxin agents   - Renally dose meds/Avoid nephrotoxin agents   - Cedillo placed, monitor I/O & electrolytes     =======Endo=======  #T2DM   # Euglycemic DKA ?  - Beta hydroxybuterate uptrend from 0.4 to 2.0, trend  - cont Insulin Gtt @ 1 U of Lantus/ISS, cont to monitor FS q1h + D5@ 30ml/hr  - NPO w/ TF   - A1c 5.6  - Hold home SGLT-2     =======ID=======  #No acute issues   - Empiric Zosyn   [ ] bcx results pending  [ ] ua negative     =======Heme=======  - SQH, SCD    =======Ethics=======   full code  Cont GOC    Mr. Campbell is a 89yo man w/ HTN and DM who presented to [facility] after episode of LOC found to be in AFib  c/b bradycardic/PEA arrest w/ ROSC after 8 minutes with 1 epi,  now transferred to [facility] for cardiac eval. Low suspicion for cardiac etiology for cardiac arrest. Patient transferred to MICU for higher level of care.       Plan:    =======Neuro=======   #intubated/sedated:  -currently intubated on RR18, FIO2:30   - sedated on propofol @ 20wean prop  - precedex added @ 0.7  - EEG prelim report negative for seizure  - CTH w/o acute findings cont w/EEG r/o seizures    =======Resp=======  # Asp Pnu   - intubated vol A/C 18/420/5/30%  - monitor ABG   - proph Zosyn     =======CV=======  #HTN  #cardiac arrest  # AS   # Shock likely Vasoplegic sec to sedation  [ ] TTE severe aortic stenosis, EF 56%  [ ] 3904 BNP from 1732  [ ] LE doppler negative DVT  [ ] on levophed @ 0.01 wean as tolerated  - Hold home HTN medications   - isch w/u when medically stable/extubated    =======GI=======  #No acute issue   # Mild Transaminitis post arrest  - started TF Glucerna 1.5  - monitor LFTs     =======Renal=======  #ALEXI 2/2 shock   - U/S left lower renal lesion. Asymmetrically atrophic right kidney.  - BUN/Cr 24/2.37 (unclear baseline), cont to monitor bicarb   - Avoid nephrotoxin agents   - Renally dose meds/Avoid nephrotoxin agents   - Cedillo placed, monitor I/O & electrolytes     =======Endo=======  #T2DM   # Euglycemic DKA ?  - Beta hydroxybuterate uptrend from 0.4 to 2.0, trend  - cont Insulin Gtt @ 1 U of Lantus/ISS, cont to monitor FS q1h + D5@ 30ml/hr  - NPO w/ TF   - A1c 5.6  - Hold home SGLT-2     =======ID=======  #No acute issues   - Empiric Zosyn   [ ] bcx results pending  [ ] ua negative     =======Heme=======  - SQH, SCD    =======Ethics=======   full code  Cont GOC    Mr. Campbell is a 89yo man w/ HTN and DM who presented to [facility] after episode of LOC found to be in AFib  c/b bradycardic/PEA arrest w/ ROSC after 8 minutes with 1 epi,  now transferred to [facility] for cardiac eval. Low suspicion for cardiac etiology for cardiac arrest. Patient transferred to MICU for higher level of care.       Plan:    =======Neuro=======   #intubated/sedated:  -currently intubated on RR18, FIO2:30   - d/c prop  - precedex continued  - EEG prelim report negative for seizure  - CTH w/o acute findings cont w/EEG r/o seizures    =======Resp=======  # Asp Pnu   - intubated vol A/C 18/420/5/30%  - monitor ABG   - proph Zosyn     =======CV=======  #HTN  #cardiac arrest  # Severe AS   # Shock likely Vasoplegic sec to sedation  [ ] TTE severe aortic stenosis, EF 56%  [ ] Cardiac consult; isch w/u/TAVR once stable  [ ] Amlodipine 5mg started for HTN   [ ] on levophed @ 0.01 wean as tolerated   [ ] LE doppler negative DVT  [ ] pt not a candidate for midodrine  - Verify home medications with daughter    =======GI=======  #No acute issue   # Mild Transaminitis post arrest  - started TF Glucerna 1.5  - monitor LFTs     =======Renal=======  #ALEXI 2/2 shock   - U/S left lower renal lesion. Asymmetrically atrophic right kidney.  - BUN/Cr 24/2.37 (unclear baseline), cont to monitor bicarb   - Renally dose meds/Avoid nephrotoxin agents   - Cedillo placed, monitor I/O & electrolytes     =======Endo=======  #T2DM   # Euglycemic DKA ?  - Beta hydroxybutyrate uptrend overnight, currently 1.2 trend q6h   - Insulin Gtt @ 1 U/hr cont to monitor FS q1h + D5@ 30ml/hr  - NPO w/ TF   - A1c 5.6  - Hold home SGLT-2     =======ID=======  #No acute issues   - Empiric Zosyn   [ ] bcx results pending  [ ] ua negative     =======Heme=======  - SQH    =======Ethics=======   full code  Cont GOC    Mr. Campbell is a 87yo man w/ HTN and DM who presented to [facility] after episode of LOC found to be in AFib  c/b bradycardic/PEA arrest w/ ROSC after 8 minutes with 1 epi,  now transferred to [facility] for cardiac eval. Low suspicion for cardiac etiology for cardiac arrest. Patient transferred to MICU for higher level of care.       Plan:    =======Neuro=======   #intubated/sedated:  -currently intubated on RR18, FIO2:30   - d/c prop  - precedex continued  - EEG prelim report negative for seizure  - CTH w/o acute findings cont w/EEG r/o seizures    =======Resp=======  # Asp Pnu   - intubated vol A/C 18/420/5/30%  - monitor ABG   - proph Zosyn     =======CV=======  #HTN  #cardiac arrest  # Severe AS   # Shock likely Vasoplegic sec to sedation  [ ] TTE severe aortic stenosis, EF 56%  [ ] Cardiac consult; isch w/u/TAVR once stable  [ ] Amlodipine 5mg started for HTN   [ ] on levophed @ 0.01 wean as tolerated   [ ] LE doppler negative DVT  [ ] pt not a candidate for midodrine  - Verify home medications with daughter    =======GI=======  #No acute issue   # Mild Transaminitis post arrest  - started TF Glucerna 1.5  - monitor LFTs     =======Renal=======  #ALEXI 2/2 shock   - U/S left lower renal lesion. Asymmetrically atrophic right kidney.  - BUN/Cr 24/2.37 (unclear baseline), cont to monitor bicarb   - Renally dose meds/Avoid nephrotoxin agents   - Cedillo placed, monitor I/O & electrolytes     =======Endo=======  #T2DM   # Euglycemic DKA ?  - Beta hydroxybutyrate uptrend overnight, currently 1.2 trend q6h   - Insulin Gtt @ 1 U/hr cont to monitor FS q1h + D5@ 30ml/hr  - NPO w/ TF   - A1c 5.6  - Hold home SGLT-2     =======ID=======  #No acute issues   - Empiric Zosyn   [ ] bcx results pending  [ ] ua negative     =======Heme=======  - SQH    =======Ethics=======   full code  Cont GOC    Mr. Campbell is a 87yo man w/ HTN and DM who presented to [facility] after episode of LOC found to be in AFib  c/b bradycardic/PEA arrest w/ ROSC after 8 minutes with 1 epi,  now transferred to [facility] for cardiac eval. Low suspicion for cardiac etiology for cardiac arrest. Patient transferred to MICU for higher level of care.       Plan:    =======Neuro=======   #intubated/sedated:  -currently intubated on RR18, FIO2:30   - d/c prop  - precedex continued  - EEG prelim report negative for seizure  - CTH w/o acute findings cont w/EEG r/o seizures    =======Resp=======  # Asp Pnu   - intubated vol A/C 18/420/5/30%  - monitor ABG   - proph Zosyn     =======CV=======  #HTN  #cardiac arrest  # Severe AS   # Shock likely Vasoplegic sec to sedation  [ ] TTE severe aortic stenosis, EF 56%  [ ] Cardiac consult; for both TAVR/ Ischemic w/u   [ ] Amlodipine 5mg started for HTN   [ ] on levophed @ 0.01 wean as tolerated   [ ] LE doppler negative DVT  [ ] pt not a candidate for midodrine  - Verify home medications with daughter    =======GI=======  #No acute issue   # Mild Transaminitis post arrest  - started TF Glucerna 1.5  - monitor LFTs     =======Renal=======  #ALEXI 2/2 shock   - U/S left lower renal lesion. Asymmetrically atrophic right kidney.  - BUN/Cr 24/2.37 (unclear baseline), cont to monitor bicarb   - Renally dose meds/Avoid nephrotoxin agents   - Cedillo placed, monitor I/O & electrolytes     =======Endo=======  #T2DM   # Euglycemic DKA ?  - Beta hydroxybutyrate uptrend overnight, currently 1.2 trend q6h   - Insulin Gtt @ 1 U/hr cont to monitor FS q1h + D5@ 30ml/hr  - NPO w/ TF   - A1c 5.6  - Hold home SGLT-2     =======ID=======  #No acute issues   - Empiric Zosyn   [ ] bcx results pending  [ ] ua negative     =======Heme=======  - SQH    =======Ethics=======   full code  Cont GOC    Mr. Campbell is a 89yo man w/ HTN and DM who presented to [facility] after episode of LOC found to be in AFib  c/b bradycardic/PEA arrest w/ ROSC after 8 minutes with 1 epi,  now transferred to [facility] for cardiac eval. Low suspicion for cardiac etiology for cardiac arrest. Patient transferred to MICU for higher level of care.       Plan:    =======Neuro=======   #intubated/sedated:  -currently intubated on RR18, FIO2:30   - d/c prop  - precedex continued  - EEG prelim report negative for seizure  - CTH w/o acute findings cont w/EEG r/o seizures    =======Resp=======  # Asp Pnu   - intubated vol A/C 18/420/5/30%  - monitor ABG   - proph Zosyn     =======CV=======  #HTN  #cardiac arrest  # Severe AS   # Shock likely Vasoplegic sec to sedation  [ ] TTE severe aortic stenosis, EF 56%  [ ] Cardiac consult; for both TAVR/ Ischemic w/u   [ ] Amlodipine 5mg started for HTN   [ ] on levophed @ 0.01 wean as tolerated   [ ] LE doppler negative DVT  [ ] pt not a candidate for midodrine  - Verify home medications with daughter    =======GI=======  #No acute issue   # Mild Transaminitis post arrest  - started TF Glucerna 1.5  - monitor LFTs     =======Renal=======  #ALEXI 2/2 shock   - U/S left lower renal lesion. Asymmetrically atrophic right kidney.  - BUN/Cr 24/2.37 (unclear baseline), cont to monitor bicarb   - Renally dose meds/Avoid nephrotoxin agents   - Cedillo placed, monitor I/O & electrolytes     =======Endo=======  #T2DM   # Euglycemic DKA ?  - Beta hydroxybutyrate uptrend overnight, currently 1.2 trend q6h   - Insulin Gtt @ 1 U/hr cont to monitor FS q1h + D5@ 30ml/hr  - NPO w/ TF   - A1c 5.6  - Hold home SGLT-2     =======ID=======  #No acute issues   - Empiric Zosyn   [ ] bcx results pending  [ ] ua negative     =======Heme=======  - SQH    =======Ethics=======   full code  Cont GOC    Mr. Campbell is a 87yo man w/ HTN and DM who presented to [facility] after episode of LOC found to be in AFib  c/b bradycardic/PEA arrest w/ ROSC after 8 minutes with 1 epi,  now transferred to [facility] for cardiac eval. Low suspicion for cardiac etiology for cardiac arrest. Patient transferred to MICU for higher level of care.       Plan:    =======Neuro=======   #intubated/sedated:  -currently intubated on RR18, FIO2:30   - d/c prop  - precedex continued  - EEG prelim report negative for seizure  - CTH w/o acute findings cont w/EEG r/o seizures    =======Resp=======  # Asp Pnu   - intubated vol A/C 18/420/5/30%  - monitor ABG   - proph Zosyn   -  =======CV=======  #HTN  #cardiac arrest  # Severe AS   # Shock likely Vasoplegic sec to sedation  [ ] TTE severe aortic stenosis, EF 56%  [ ] Cardiac consult; TAVR/ Ischemic w/u   [ ] Amlodipine 5mg for HTN   [ ] on levophed @ 0.01 wean as tolerated   [ ] LE doppler negative DVT  [ ] pt not a candidate for midodrine d/t AS  - Verify home medications with daughter    =======GI=======  #No acute issue   # Mild Transaminitis post arrest  - started TF Glucerna 1.5  - monitor LFTs     =======Renal=======  #ALEXI 2/2 shock   - U/S left lower renal lesion. Asymmetrically atrophic right kidney.  - BUN/Cr 24/2.37 (unclear baseline), cont to monitor bicarb   - Renally dose meds/Avoid nephrotoxin agents   - Cedillo placed, monitor I/O & electrolytes     =======Endo=======  #T2DM   # Euglycemic DKA ?  - Cont Insulin Gtt @ 1 U/hr cont to monitor FS q1h + D5@ 30ml/hr  - Beta hydroxybutyrate uptrend until normal, currently trend q6h reassess insulin gtt   - NPO w/ TF   - A1c 5.6  - Hold home SGLT-2     =======ID=======  #No acute issues   - Empiric Zosyn   [ ] bcx results pending  [ ] ua negative     =======Heme=======  - SQH    =======Ethics=======   full code  Cont GOC    Mr. Campbell is a 89yo man w/ HTN and DM who presented to [facility] after episode of LOC found to be in AFib  c/b bradycardic/PEA arrest w/ ROSC after 8 minutes with 1 epi,  now transferred to [facility] for cardiac eval. Low suspicion for cardiac etiology for cardiac arrest. Patient transferred to MICU for higher level of care.       Plan:    =======Neuro=======   #intubated/sedated:  -currently intubated on RR18, FIO2:30   - d/c prop  - precedex continued  - EEG prelim report negative for seizure  - CTH w/o acute findings cont w/EEG r/o seizures    =======Resp=======  # Asp Pnu   - intubated vol A/C 18/420/5/30%  - monitor ABG   - proph Zosyn   -  =======CV=======  #HTN  #cardiac arrest  # Severe AS   # Shock likely Vasoplegic sec to sedation  [ ] TTE severe aortic stenosis, EF 56%  [ ] Cardiac consult; TAVR/ Ischemic w/u   [ ] Amlodipine 5mg for HTN   [ ] on levophed @ 0.01 wean as tolerated   [ ] LE doppler negative DVT  [ ] pt not a candidate for midodrine d/t AS  - Verify home medications with daughter    =======GI=======  #No acute issue   # Mild Transaminitis post arrest  - started TF Glucerna 1.5  - monitor LFTs     =======Renal=======  #ALEXI 2/2 shock   - U/S left lower renal lesion. Asymmetrically atrophic right kidney.  - BUN/Cr 24/2.37 (unclear baseline), cont to monitor bicarb   - Renally dose meds/Avoid nephrotoxin agents   - Cedillo placed, monitor I/O & electrolytes     =======Endo=======  #T2DM   # Euglycemic DKA ?  - Cont Insulin Gtt @ 1 U/hr cont to monitor FS q1h + D5@ 30ml/hr  - Beta hydroxybutyrate uptrend until normal, currently trend q6h reassess insulin gtt   - NPO w/ TF   - A1c 5.6  - Hold home SGLT-2     =======ID=======  #No acute issues   - Empiric Zosyn   [ ] bcx results pending  [ ] ua negative     =======Heme=======  - SQH    =======Ethics=======   full code  Cont GOC    Mr. Campbell is a 89yo man w/ HTN and DM who presented to [facility] after episode of LOC found to be in AFib  c/b bradycardic/PEA arrest w/ ROSC after 8 minutes with 1 epi,  now transferred to [facility] for cardiac eval. Low suspicion for cardiac etiology for cardiac arrest. Patient transferred to MICU for higher level of care.       Plan:    =======Neuro=======   #intubated/sedated:  -currently intubated on RR18, FIO2:30   - d/c prop  - Precedex continued  - EEG prelim report negative for seizure  - CTH w/o acute findings cont w/EEG r/o seizures    =======Resp=======  # Asp Pnu   - intubated vol A/C 18/420/5/30%  - monitor ABG   - proph Zosyn   -  =======CV=======  #HTN  #cardiac arrest  # Severe AS   # Shock likely Vasoplegic sec to sedation  [ ] TTE severe aortic stenosis, EF 56%  [ ] Cardiac consult; TAVR/ Ischemic w/u   [ ] Amlodipine 5mg for HTN   [ ] on levophed @ 0.01 wean as tolerated   [ ] LE doppler negative DVT  [ ] pt not a candidate for midodrine d/t AS  - Verify home medications with daughter    =======GI=======  #No acute issue   # Mild Transaminitis post arrest  - started TF Glucerna 1.5  - monitor LFTs     =======Renal=======  #ALEXI 2/2 shock   - U/S left lower renal lesion. Asymmetrically atrophic right kidney.  - BUN/Cr 24/2.37 (unclear baseline), cont to monitor bicarb   - Renally dose meds/Avoid nephrotoxin agents   - Cedillo placed, monitor I/O & electrolytes     =======Endo=======  #T2DM   # Euglycemic DKA ?  - Cont Insulin Gtt @ 1 U/hr cont to monitor FS q1h + D5@ 30ml/hr  - Beta hydroxybutyrate uptrend until normal, currently trend q6h reassess insulin gtt   - NPO w/ TF   - A1c 5.6  - Hold home SGLT-2     =======ID=======  #No acute issues   - Empiric Zosyn   [ ] bcx results pending  [ ] ua negative     =======Heme=======  - SQH    =======Ethics=======   full code  Cont GOC    Mr. Campbell is a 87yo man w/ HTN and DM who presented to [facility] after episode of LOC found to be in AFib  c/b bradycardic/PEA arrest w/ ROSC after 8 minutes with 1 epi,  now transferred to [facility] for cardiac eval. Low suspicion for cardiac etiology for cardiac arrest. Patient transferred to MICU for higher level of care.       Plan:    =======Neuro=======   #intubated/sedated:  -currently intubated on RR18, FIO2:30   - d/c prop  - Precedex continued  - EEG prelim report negative for seizure  - CTH w/o acute findings cont w/EEG r/o seizures    =======Resp=======  # Asp Pnu   - intubated vol A/C 18/420/5/30%  - monitor ABG   - proph Zosyn   -  =======CV=======  #HTN  #cardiac arrest  # Severe AS   # Shock likely Vasoplegic sec to sedation  [ ] TTE severe aortic stenosis, EF 56%  [ ] Cardiac consult; TAVR/ Ischemic w/u   [ ] Amlodipine 5mg for HTN   [ ] on levophed @ 0.01 wean as tolerated   [ ] LE doppler negative DVT  [ ] pt not a candidate for midodrine d/t AS  - Verify home medications with daughter    =======GI=======  #No acute issue   # Mild Transaminitis post arrest  - started TF Glucerna 1.5  - monitor LFTs     =======Renal=======  #ALEXI 2/2 shock   - U/S left lower renal lesion. Asymmetrically atrophic right kidney.  - BUN/Cr 24/2.37 (unclear baseline), cont to monitor bicarb   - Renally dose meds/Avoid nephrotoxin agents   - Cedillo placed, monitor I/O & electrolytes     =======Endo=======  #T2DM   # Euglycemic DKA ?  - Cont Insulin Gtt @ 1 U/hr cont to monitor FS q1h + D5@ 30ml/hr  - Beta hydroxybutyrate uptrend until normal, currently trend q6h reassess insulin gtt   - NPO w/ TF   - A1c 5.6  - Hold home SGLT-2     =======ID=======  #No acute issues   - Empiric Zosyn   [ ] bcx results pending  [ ] ua negative     =======Heme=======  - SQH    =======Ethics=======   full code  Cont GOC    Mr. Campbell is a 89yo man w/ HTN and DM who presented to [facility] after episode of LOC found to be in AFib  c/b bradycardic/PEA arrest w/ ROSC after 8 minutes with 1 epi,  now transferred to [facility] for cardiac eval. Low suspicion for cardiac etiology for cardiac arrest. Patient transferred to MICU for higher level of care.       Plan:    =======Neuro=======   #intubated/sedated:  -currently intubated on RR18, FIO2:30   - d/c prop  - Precedex continued  - EEG prelim report negative for seizure  - CTH w/o acute findings cont w/EEG r/o seizures    =======Resp=======  # Asp Pnu   - intubated vol A/C 18/420/5/30%  - monitor ABG   - proph Zosyn   -  =======CV=======  #HTN  #cardiac arrest  # Severe AS   # Shock likely Vasoplegic sec to sedation  [ ] TTE severe aortic stenosis, EF 56%  [ ] Cardiac consult; TAVR/ Ischemic w/u   [ ] Amlodipine 5mg for HTN   [ ] on levophed @ 0.01 wean as tolerated   [ ] LE doppler negative DVT  [ ] pt not a candidate for midodrine d/t AS  - Verify home medications with daughter    =======GI=======  #No acute issue   # Mild Transaminitis post arrest  - started TF Glucerna 1.5  - monitor LFTs     =======Renal=======  #ALEXI 2/2 shock   - U/S left lower renal lesion. Asymmetrically atrophic right kidney.  - BUN/Cr 24/2.37 (unclear baseline), cont to monitor bicarb   - Renally dose meds/Avoid nephrotoxin agents   - Cedillo placed, monitor I/O & electrolytes     =======Endo=======  #T2DM   # Euglycemic DKA ?  - Insulin Gtt @ 1.5 U/hr cont to monitor FS q1h + D5@ 30ml/hr  - Beta hydroxybutyrate labs q6h reassess insulin gtt when normal  - NPO w/ TF   - A1c 5.6  - Hold home SGLT-2     =======ID=======  #No acute issues   - Empiric Zosyn   [ ] bcx results pending  [ ] ua negative     =======Heme=======  - SQH    =======Ethics=======   full code  Cont GOC    Mr. Campbell is a 87yo man w/ HTN and DM who presented to [facility] after episode of LOC found to be in AFib  c/b bradycardic/PEA arrest w/ ROSC after 8 minutes with 1 epi,  now transferred to [facility] for cardiac eval. Low suspicion for cardiac etiology for cardiac arrest. Patient transferred to MICU for higher level of care.       Plan:    =======Neuro=======   #intubated/sedated:  -currently intubated on RR18, FIO2:30   - d/c prop  - Precedex continued  - EEG prelim report negative for seizure  - CTH w/o acute findings cont w/EEG r/o seizures    =======Resp=======  # Asp Pnu   - intubated vol A/C 18/420/5/30%  - monitor ABG   - proph Zosyn   -  =======CV=======  #HTN  #cardiac arrest  # Severe AS   # Shock likely Vasoplegic sec to sedation  [ ] TTE severe aortic stenosis, EF 56%  [ ] Cardiac consult; TAVR/ Ischemic w/u   [ ] Amlodipine 5mg for HTN   [ ] on levophed @ 0.01 wean as tolerated   [ ] LE doppler negative DVT  [ ] pt not a candidate for midodrine d/t AS  - Verify home medications with daughter    =======GI=======  #No acute issue   # Mild Transaminitis post arrest  - started TF Glucerna 1.5  - monitor LFTs     =======Renal=======  #ALEXI 2/2 shock   - U/S left lower renal lesion. Asymmetrically atrophic right kidney.  - BUN/Cr 24/2.37 (unclear baseline), cont to monitor bicarb   - Renally dose meds/Avoid nephrotoxin agents   - Cedillo placed, monitor I/O & electrolytes     =======Endo=======  #T2DM   # Euglycemic DKA ?  - Insulin Gtt @ 1.5 U/hr cont to monitor FS q1h + D5@ 30ml/hr  - Beta hydroxybutyrate labs q6h reassess insulin gtt when normal  - NPO w/ TF   - A1c 5.6  - Hold home SGLT-2     =======ID=======  #No acute issues   - Empiric Zosyn   [ ] bcx results pending  [ ] ua negative     =======Heme=======  - SQH    =======Ethics=======   full code  Cont GOC    Mr. Campbell is a 87yo man w/ HTN and DM who presented to [facility] after episode of LOC found to be in AFib  c/b bradycardic/PEA arrest w/ ROSC after 8 minutes with 1 epi,  now transferred to [facility] for cardiac eval. Low suspicion for cardiac etiology for cardiac arrest. Patient transferred to MICU for higher level of care.       Plan:    =======Neuro=======   #intubated/sedated:  -currently intubated on RR18, FIO2:30   - d/c prop  - Precedex continued  - EEG prelim report negative for seizure  - CTH w/o acute findings cont w/EEG r/o seizures    =======Resp=======  # Asp Pnu   - intubated vol A/C 18/420/5/30%  - monitor ABG   - proph Zosyn   -  =======CV=======  #HTN  #cardiac arrest  # Severe AS   # Shock likely Vasoplegic sec to sedation  [ ] TTE severe aortic stenosis, EF 56%  [ ] Cardiac consult; TAVR/ Ischemic w/u   [ ] Amlodipine 5mg for HTN   [ ] on levophed @ 0.01 wean as tolerated   [ ] LE doppler negative DVT  [ ] pt not a candidate for midodrine d/t AS  - Verify home medications with daughter    =======GI=======  #No acute issue   # Mild Transaminitis post arrest  - started TF Glucerna 1.5  - monitor LFTs     =======Renal=======  #ALEXI 2/2 shock   - U/S left lower renal lesion. Asymmetrically atrophic right kidney.  - BUN/Cr 24/2.37 (unclear baseline), cont to monitor bicarb   - Renally dose meds/Avoid nephrotoxin agents   - Cedillo placed, monitor I/O & electrolytes     =======Endo=======  #T2DM   # Euglycemic DKA ?  - Insulin Gtt @ 2 U/hr cont to monitor FS q1h + D5@ 30ml/hr  - Beta hydroxybutyrate labs q6h reassess insulin gtt when normal  - NPO w/ TF   - A1c 5.6  - Hold home SGLT-2     =======ID=======  #No acute issues   - Empiric Zosyn   [ ] bcx results pending  [ ] ua negative     =======Heme=======  - SQH    =======Ethics=======   full code  Cont GOC    Mr. Campbell is a 87yo man w/ HTN and DM who presented to [facility] after episode of LOC found to be in AFib  c/b bradycardic/PEA arrest w/ ROSC after 8 minutes with 1 epi,  now transferred to [facility] for cardiac eval. Low suspicion for cardiac etiology for cardiac arrest. Patient transferred to MICU for higher level of care.       Plan:    =======Neuro=======   #intubated/sedated:  -currently intubated on RR18, FIO2:30   - d/c prop  - Precedex continued  - EEG prelim report negative for seizure  - CTH w/o acute findings cont w/EEG r/o seizures    =======Resp=======  # Asp Pnu   - intubated vol A/C 18/420/5/30%  - monitor ABG   - proph Zosyn   -  =======CV=======  #HTN  #cardiac arrest  # Severe AS   # Shock likely Vasoplegic sec to sedation  [ ] TTE severe aortic stenosis, EF 56%  [ ] Cardiac consult; TAVR/ Ischemic w/u   [ ] Amlodipine 5mg for HTN   [ ] on levophed @ 0.01 wean as tolerated   [ ] LE doppler negative DVT  [ ] pt not a candidate for midodrine d/t AS  - Verify home medications with daughter    =======GI=======  #No acute issue   # Mild Transaminitis post arrest  - started TF Glucerna 1.5  - monitor LFTs     =======Renal=======  #ALEXI 2/2 shock   - U/S left lower renal lesion. Asymmetrically atrophic right kidney.  - BUN/Cr 24/2.37 (unclear baseline), cont to monitor bicarb   - Renally dose meds/Avoid nephrotoxin agents   - Cedillo placed, monitor I/O & electrolytes     =======Endo=======  #T2DM   # Euglycemic DKA ?  - Insulin Gtt @ 2 U/hr cont to monitor FS q1h + D5@ 30ml/hr  - Beta hydroxybutyrate labs q6h reassess insulin gtt when normal  - NPO w/ TF   - A1c 5.6  - Hold home SGLT-2     =======ID=======  #No acute issues   - Empiric Zosyn   [ ] bcx results pending  [ ] ua negative     =======Heme=======  - SQH    =======Ethics=======   full code  Cont GOC    Mr. Campbell is a 87yo man w/ HTN and DM who presented to [facility] after episode of LOC found to be in AFib  c/b bradycardic/PEA arrest w/ ROSC after 8 minutes with 1 epi,  now transferred to [facility] for cardiac eval. Low suspicion for cardiac etiology for cardiac arrest. Patient transferred to MICU for higher level of care.       Plan:    =======Neuro=======   #intubated/sedated:  -currently intubated on RR18, FIO2:30   - d/c prop  - Precedex continued  - EEG prelim report negative for seizure  - CTH w/o acute findings cont w/EEG r/o seizures    =======Resp=======  # Asp Pnu   - intubated vol A/C 18/420/5/30%  - monitor ABG   - empiric Zosyn     =======CV=======  #HTN  #cardiac arrest  # Severe AS   # Shock likely Vasoplegic sec to sedation  [ ] TTE severe aortic stenosis, EF 56%  [ ] Cardiac consult; TAVR/ Ischemic w/u once extubated  [ ] Amlodipine 5mg for HTN   [ ] on levophed @ 0.01 wean as tolerated   [ ] LE doppler negative DVT  [ ] pt not a candidate for midodrine d/t AS  - Verify home medications with daughter    =======GI=======  #No acute issue   # Mild Transaminitis post arrest  - started TF Glucerna 1.5  - monitor LFTs     =======Renal=======  #ALEXI 2/2 shock   - U/S left lower renal lesion. Asymmetrically atrophic right kidney.   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  - Insulin Gtt @ 2 U/hr cont to monitor FS q1h + D5@ 30ml/hr  - Beta hydroxybutyrate labs q6h reassess insulin gtt when normal  - NPO w/ TF   - A1c 5.6  - Hold home SGLT-2     =======ID=======  #asp pnu   - 12/13 Empiric Zosyn 7d course  [ ] bcx no growth to date  [ ] ua negative     =======Heme=======  - SQH    =======Ethics=======   full code  Cont GOC    Mr. Campbell is a 89yo man w/ HTN and DM who presented to [facility] after episode of LOC found to be in AFib  c/b bradycardic/PEA arrest w/ ROSC after 8 minutes with 1 epi,  now transferred to [facility] for cardiac eval. Low suspicion for cardiac etiology for cardiac arrest. Patient transferred to MICU for higher level of care.       Plan:    =======Neuro=======   #intubated/sedated:  -currently intubated on RR18, FIO2:30   - d/c prop  - Precedex continued  - EEG prelim report negative for seizure  - CTH w/o acute findings cont w/EEG r/o seizures    =======Resp=======  # Asp Pnu   - intubated vol A/C 18/420/5/30%  - monitor ABG   - empiric Zosyn     =======CV=======  #HTN  #cardiac arrest  # Severe AS   # Shock likely Vasoplegic sec to sedation  [ ] TTE severe aortic stenosis, EF 56%  [ ] Cardiac consult; TAVR/ Ischemic w/u once extubated  [ ] Amlodipine 5mg for HTN   [ ] on levophed @ 0.01 wean as tolerated   [ ] LE doppler negative DVT  [ ] pt not a candidate for midodrine d/t AS  - Verify home medications with daughter    =======GI=======  #No acute issue   # Mild Transaminitis post arrest  - started TF Glucerna 1.5  - monitor LFTs     =======Renal=======  #ALEXI 2/2 shock   - U/S left lower renal lesion. Asymmetrically atrophic right kidney.   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  - Insulin Gtt @ 2 U/hr cont to monitor FS q1h + D5@ 30ml/hr  - Beta hydroxybutyrate labs q6h reassess insulin gtt when normal  - NPO w/ TF   - A1c 5.6  - Hold home SGLT-2     =======ID=======  #asp pnu   - 12/13 Empiric Zosyn 7d course  [ ] bcx no growth to date  [ ] ua negative     =======Heme=======  - SQH    =======Ethics=======   full code  Cont GOC    Mr. Campbell is a 89yo man w/ HTN and DM who presented to [facility] after episode of LOC found to be in AFib  c/b bradycardic/PEA arrest w/ ROSC after 8 minutes with 1 epi,  now transferred to [facility] for cardiac eval. Low suspicion for cardiac etiology for cardiac arrest. Patient transferred to MICU for higher level of care.       Plan:    =======Neuro=======   #intubated/sedated:  -currently intubated on RR18, FIO2:30   - d/c prop  - Precedex continued  - Neuron specific antigen lab ordered for AM 12/16  - EEG prelim report negative for seizure  - CTH w/o acute findings cont w/EEG r/o seizures    =======Resp=======  # Asp Pnu   - intubated vol A/C 18/420/5/30%  - monitor ABG   - empiric Zosyn     =======CV=======  #HTN  #cardiac arrest  # Severe AS   # Shock likely Vasoplegic sec to sedation  [ ] TTE severe aortic stenosis, EF 56%  [ ] Cardiac consult; TAVR/ Ischemic w/u once extubated  [ ] Amlodipine 5mg for HTN   [ ] on levophed @ 0.01 wean as tolerated   [ ] LE doppler negative DVT  [ ] pt not a candidate for midodrine d/t AS  - Verify home medications with daughter    =======GI=======  #No acute issue   # Mild Transaminitis post arrest  - started TF Glucerna 1.5  - monitor LFTs     =======Renal=======  #ALEXI 2/2 shock   - U/S left lower renal lesion. Asymmetrically atrophic right kidney.   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  - Insulin Gtt @ 2 U/hr cont to monitor FS q1h + D5@ 30ml/hr  - Beta hydroxybutyrate labs q6h reassess insulin gtt when normal  - NPO w/ TF   - A1c 5.6  - Hold home SGLT-2     =======ID=======  #asp pnu   - 12/13 Empiric Zosyn 7d course  [ ] bcx no growth to date  [ ] ua negative     =======Heme=======  - SQH    =======Ethics=======   full code  Cont GOC    Mr. Campbell is a 87yo man w/ HTN and DM who presented to [facility] after episode of LOC found to be in AFib  c/b bradycardic/PEA arrest w/ ROSC after 8 minutes with 1 epi,  now transferred to [facility] for cardiac eval. Low suspicion for cardiac etiology for cardiac arrest. Patient transferred to MICU for higher level of care.       Plan:    =======Neuro=======   #intubated/sedated:  -currently intubated on RR18, FIO2:30   - d/c prop  - Precedex continued  - Neuron specific antigen lab ordered for AM 12/16  - EEG prelim report negative for seizure  - CTH w/o acute findings cont w/EEG r/o seizures    =======Resp=======  # Asp Pnu   - intubated vol A/C 18/420/5/30%  - monitor ABG   - empiric Zosyn     =======CV=======  #HTN  #cardiac arrest  # Severe AS   # Shock likely Vasoplegic sec to sedation  [ ] TTE severe aortic stenosis, EF 56%  [ ] Cardiac consult; TAVR/ Ischemic w/u once extubated  [ ] Amlodipine 5mg for HTN   [ ] on levophed @ 0.01 wean as tolerated   [ ] LE doppler negative DVT  [ ] pt not a candidate for midodrine d/t AS  - Verify home medications with daughter    =======GI=======  #No acute issue   # Mild Transaminitis post arrest  - started TF Glucerna 1.5  - monitor LFTs     =======Renal=======  #ALEXI 2/2 shock   - U/S left lower renal lesion. Asymmetrically atrophic right kidney.   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  - Insulin Gtt @ 2 U/hr cont to monitor FS q1h + D5@ 30ml/hr  - Beta hydroxybutyrate labs q6h reassess insulin gtt when normal  - NPO w/ TF   - A1c 5.6  - Hold home SGLT-2     =======ID=======  #asp pnu   - 12/13 Empiric Zosyn 7d course  [ ] bcx no growth to date  [ ] ua negative     =======Heme=======  - SQH    =======Ethics=======   full code  Cont GOC    Mr. Campbell is a 89yo man w/ HTN and DM who presented to [facility] after episode of LOC found to be in AFib  c/b bradycardic/PEA arrest w/ ROSC after 8 minutes with 1 epi,  now transferred to [facility] for cardiac eval. Low suspicion for cardiac etiology for cardiac arrest. Patient transferred to MICU for higher level of care.       Plan:    =======Neuro=======   #intubated/sedated:  -currently intubated on RR18, FIO2:30   - d/c prop  - Precedex continued  - Neuron specific antigen lab ordered for AM 12/16  - EEG prelim report negative for seizure  - CTH w/o acute findings cont w/EEG r/o seizures    =======Resp=======  # Asp Pnu   - intubated vol A/C 18/420/5/30%  - monitor ABG   - empiric Zosyn     =======CV=======  #HTN  #cardiac arrest  # Severe AS   # Shock likely Vasoplegic sec to sedation  [ ] TTE severe aortic stenosis, EF 56%  [ ] Cardiac consult; TAVR/ Ischemic w/u once extubated  [ ] Amlodipine 5mg for HTN   [ ] on levophed @ 0.01 wean as tolerated   [ ] LE doppler negative DVT  [ ] pt not a candidate for midodrine d/t AS  - Verify home medications with daughter    =======GI=======  #No acute issue   # Mild Transaminitis post arrest  - started TF Glucerna 1.5  - monitor LFTs     =======Renal=======  #ALEXI 2/2 shock   - U/S left lower renal lesion. Asymmetrically atrophic right kidney.   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  - Insulin Gtt @ 2.5 U/hr cont to monitor FS q1h + D5@ 30ml/hr  - Beta hydroxybutyrate labs q6h reassess insulin gtt when normal  - NPO w/ TF   - A1c 5.6  - Hold home SGLT-2     =======ID=======  #asp pnu   - 12/13 Empiric Zosyn 7d course  [ ] bcx no growth to date  [ ] ua negative     =======Heme=======  - SQH    =======Ethics=======   full code  Cont GOC    Mr. Campbell is a 87yo man w/ HTN and DM who presented to [facility] after episode of LOC found to be in AFib  c/b bradycardic/PEA arrest w/ ROSC after 8 minutes with 1 epi,  now transferred to [facility] for cardiac eval. Low suspicion for cardiac etiology for cardiac arrest. Patient transferred to MICU for higher level of care.       Plan:    =======Neuro=======   #intubated/sedated:  -currently intubated on RR18, FIO2:30   - d/c prop  - Precedex continued  - Neuron specific antigen lab ordered for AM 12/16  - EEG prelim report negative for seizure  - CTH w/o acute findings cont w/EEG r/o seizures    =======Resp=======  # Asp Pnu   - intubated vol A/C 18/420/5/30%  - monitor ABG   - empiric Zosyn     =======CV=======  #HTN  #cardiac arrest  # Severe AS   # Shock likely Vasoplegic sec to sedation  [ ] TTE severe aortic stenosis, EF 56%  [ ] Cardiac consult; TAVR/ Ischemic w/u once extubated  [ ] Amlodipine 5mg for HTN   [ ] on levophed @ 0.01 wean as tolerated   [ ] LE doppler negative DVT  [ ] pt not a candidate for midodrine d/t AS  - Verify home medications with daughter    =======GI=======  #No acute issue   # Mild Transaminitis post arrest  - started TF Glucerna 1.5  - monitor LFTs     =======Renal=======  #ALEXI 2/2 shock   - U/S left lower renal lesion. Asymmetrically atrophic right kidney.   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  - Insulin Gtt @ 2.5 U/hr cont to monitor FS q1h + D5@ 30ml/hr  - Beta hydroxybutyrate labs q6h reassess insulin gtt when normal  - NPO w/ TF   - A1c 5.6  - Hold home SGLT-2     =======ID=======  #asp pnu   - 12/13 Empiric Zosyn 7d course  [ ] bcx no growth to date  [ ] ua negative     =======Heme=======  - SQH    =======Ethics=======   full code  Cont GOC

## 2023-12-16 NOTE — PROGRESS NOTE ADULT - SUBJECTIVE AND OBJECTIVE BOX
INTERVAL HPI/OVERNIGHT EVENTS:    HPI:    SUBJECTIVE: Patient seen and examined at bedside.     CONSTITUTIONAL: No weakness, fevers or chills  EYES/ENT: No visual changes;  No vertigo or throat pain   NECK: No pain or stiffness  RESPIRATORY: No cough, wheezing, hemoptysis; No shortness of breath  CARDIOVASCULAR: No chest pain or palpitations  GASTROINTESTINAL: No abdominal or epigastric pain. No nausea, vomiting, or hematemesis; No diarrhea or constipation. No melena or hematochezia.  GENITOURINARY: No dysuria, frequency or hematuria  NEUROLOGICAL: No numbness or weakness  SKIN: No itching, rashes    OBJECTIVE:    VITAL SIGNS:  ICU Vital Signs Last 24 Hrs  T(C): 38 (16 Dec 2023 08:00), Max: 39 (15 Dec 2023 20:00)  T(F): 100.4 (16 Dec 2023 08:00), Max: 102.2 (15 Dec 2023 20:00)  HR: 79 (16 Dec 2023 09:00) (60 - 83)  BP: --  BP(mean): --  ABP: 155/63 (16 Dec 2023 09:00) (109/45 - 155/63)  ABP(mean): 98 (16 Dec 2023 09:00) (69 - 98)  RR: 18 (16 Dec 2023 09:00) (18 - 18)  SpO2: 100% (16 Dec 2023 09:00) (98% - 100%)    O2 Parameters below as of 16 Dec 2023 09:00  Patient On (Oxygen Delivery Method): ventilator    O2 Concentration (%): 30      Mode: AC/ CMV (Assist Control/ Continuous Mandatory Ventilation), RR (machine): 18, TV (machine): 420, FiO2: 30, PEEP: 5, ITime: 0.67, MAP: 9, PIP: 20    12-15 @ 07:01  -  12-16 @ 07:00  --------------------------------------------------------  IN: 2396.3 mL / OUT: 1685 mL / NET: 711.3 mL    12-16 @ 07:01  -  12-16 @ 10:09  --------------------------------------------------------  IN: 119 mL / OUT: 160 mL / NET: -41 mL      CAPILLARY BLOOD GLUCOSE  123 (14 Dec 2023 18:00)      POCT Blood Glucose.: 131 mg/dL (16 Dec 2023 07:06)      PHYSICAL EXAM:    General: NAD  HEENT: NC/AT; PERRL, clear conjunctiva  Neck: supple  Respiratory: CTA b/l  Cardiovascular: +S1/S2; RRR  Abdomen: soft, NT/ND; +BS x4  Extremities: WWP, 2+ peripheral pulses b/l; no LE edema  Skin: normal color and turgor; no rash  Neurological:    MEDICATIONS:  MEDICATIONS  (STANDING):  chlorhexidine 2% Cloths 1 Application(s) Topical two times a day  dextrose 5%. 1000 milliLiter(s) (50 mL/Hr) IV Continuous <Continuous>  dextrose 50% Injectable 25 Gram(s) IV Push once  glucagon  Injectable 1 milliGRAM(s) IntraMuscular once  heparin   Injectable 5000 Unit(s) SubCutaneous every 8 hours  influenza  Vaccine (HIGH DOSE) 0.7 milliLiter(s) IntraMuscular once  insulin lispro (ADMELOG) corrective regimen sliding scale   SubCutaneous every 6 hours  norepinephrine Infusion 0.05 MICROgram(s)/kG/Min (5.81 mL/Hr) IV Continuous <Continuous>  petrolatum Ophthalmic Ointment 1 Application(s) Both EYES two times a day  piperacillin/tazobactam IVPB.. 3.375 Gram(s) IV Intermittent every 12 hours    MEDICATIONS  (PRN):  dextrose Oral Gel 15 Gram(s) Oral once PRN Blood Glucose LESS THAN 70 milliGRAM(s)/deciliter      ALLERGIES:  Allergies    No Known Allergies    Intolerances        LABS:                        7.6    7.23  )-----------( 192      ( 16 Dec 2023 01:20 )             21.8     12-16    135  |  106  |  25<H>  ----------------------------<  197<H>  3.8   |  16<L>  |  2.05<H>    Ca    9.0      16 Dec 2023 01:20  Phos  3.6     12-16  Mg     2.00     12-16    TPro  6.1  /  Alb  3.1<L>  /  TBili  0.4  /  DBili  x   /  AST  65<H>  /  ALT  74<H>  /  AlkPhos  66  12-16    PT/INR - ( 16 Dec 2023 01:20 )   PT: 11.8 sec;   INR: 1.05 ratio           Urinalysis Basic - ( 16 Dec 2023 01:20 )    Color: x / Appearance: x / SG: x / pH: x  Gluc: 197 mg/dL / Ketone: x  / Bili: x / Urobili: x   Blood: x / Protein: x / Nitrite: x   Leuk Esterase: x / RBC: x / WBC x   Sq Epi: x / Non Sq Epi: x / Bacteria: x        RADIOLOGY & ADDITIONAL TESTS: Reviewed. INTERVAL HPI/OVERNIGHT EVENTS: Transitioned off insilin gtt with lantus 10u and 3 premeals.  Patient waking up.     HPI: Mr. Campbell is a 87yo man w/ HTN and DM who was transferred from [facility] after cardiac arrest for further care,     [place] is from the , [place] receive medical care in the [place]. He is visiting his daughter. Per his daughter, this morning he had a event of LOC that lasted a few minutes where his daughter found him on the ground, unresponsive, w/ gaze deviation and rigid. She is unsure if there was any seizure, but denies any hx of seizures or neurological issues. He regained consciousness spontaneously and was able to recognize his daughter. He was brought to [facility] [place] where he was found to be afebrile w/ normal BP, and tachycardic w/ AFib With rates in the 140-150s. While at [place] pt denied CP, SOB, abd pain, c/d/n/v, LE swelling. While in the ED at [place] he had an acute episode of reported gaze deviation and unresponsiveness. During the episode he became hypoxic, He then lost a pulse and ACLS was started w/ ROSC after 8min with one round of epi. He was intubated and started on prop and levophed. CTH showed no bleed.     Transferred to [facility] for further cardiac eval. Cardiology here at [facility] had low suspicion for Cardiac etiology for Cardiac arrest , recommended MICU consult. EKG notable for PVCs , no ST elevation / no ST depression.        SUBJECTIVE: Patient seen and examined at bedside.     ROS: Unable to assess as patient intubated     OBJECTIVE:    VITAL SIGNS:  ICU Vital Signs Last 24 Hrs  T(C): 38 (16 Dec 2023 08:00), Max: 39 (15 Dec 2023 20:00)  T(F): 100.4 (16 Dec 2023 08:00), Max: 102.2 (15 Dec 2023 20:00)  HR: 79 (16 Dec 2023 09:00) (60 - 83)  BP: --  BP(mean): --  ABP: 155/63 (16 Dec 2023 09:00) (109/45 - 155/63)  ABP(mean): 98 (16 Dec 2023 09:00) (69 - 98)  RR: 18 (16 Dec 2023 09:00) (18 - 18)  SpO2: 100% (16 Dec 2023 09:00) (98% - 100%)    O2 Parameters below as of 16 Dec 2023 09:00  Patient On (Oxygen Delivery Method): ventilator    O2 Concentration (%): 30      Mode: AC/ CMV (Assist Control/ Continuous Mandatory Ventilation), RR (machine): 18, TV (machine): 420, FiO2: 30, PEEP: 5, ITime: 0.67, MAP: 9, PIP: 20    12-15 @ 07:01  -  12-16 @ 07:00  --------------------------------------------------------  IN: 2396.3 mL / OUT: 1685 mL / NET: 711.3 mL    12-16 @ 07:01  -  12-16 @ 10:09  --------------------------------------------------------  IN: 119 mL / OUT: 160 mL / NET: -41 mL      CAPILLARY BLOOD GLUCOSE  123 (14 Dec 2023 18:00)      POCT Blood Glucose.: 131 mg/dL (16 Dec 2023 07:06)      PHYSICAL EXAM:    GENERAL: NAD, lying in bed comfortably  HEAD:  Atraumatic, normocephalic  EYES: EOMI, PERRLA, conjunctiva and sclera clear  NECK: Supple, trachea midline, no JVD  HEART: Regular rate and rhythm, no murmurs, rubs, or gallops  LUNGS: Unlabored respirations.  Clear to auscultation bilaterally, no crackles, wheezing, or rhonchi  ABDOMEN: Soft, nontender, nondistended, +BS  EXTREMITIES: 2+ peripheral pulses bilaterally, cap refill<2 secs. No clubbing, cyanosis, or edema  NERVOUS SYSTEM: intubated waking up following commands in Romansh, moving all extremities   SKIN: No rashes or lesions      MEDICATIONS:  MEDICATIONS  (STANDING):  chlorhexidine 2% Cloths 1 Application(s) Topical two times a day  dextrose 5%. 1000 milliLiter(s) (50 mL/Hr) IV Continuous <Continuous>  dextrose 50% Injectable 25 Gram(s) IV Push once  glucagon  Injectable 1 milliGRAM(s) IntraMuscular once  heparin   Injectable 5000 Unit(s) SubCutaneous every 8 hours  influenza  Vaccine (HIGH DOSE) 0.7 milliLiter(s) IntraMuscular once  insulin lispro (ADMELOG) corrective regimen sliding scale   SubCutaneous every 6 hours  norepinephrine Infusion 0.05 MICROgram(s)/kG/Min (5.81 mL/Hr) IV Continuous <Continuous>  petrolatum Ophthalmic Ointment 1 Application(s) Both EYES two times a day  piperacillin/tazobactam IVPB.. 3.375 Gram(s) IV Intermittent every 12 hours    MEDICATIONS  (PRN):  dextrose Oral Gel 15 Gram(s) Oral once PRN Blood Glucose LESS THAN 70 milliGRAM(s)/deciliter      ALLERGIES:  Allergies    No Known Allergies    Intolerances        LABS:                        7.6    7.23  )-----------( 192      ( 16 Dec 2023 01:20 )             21.8     12-16    135  |  106  |  25<H>  ----------------------------<  197<H>  3.8   |  16<L>  |  2.05<H>    Ca    9.0      16 Dec 2023 01:20  Phos  3.6     12-16  Mg     2.00     12-16    TPro  6.1  /  Alb  3.1<L>  /  TBili  0.4  /  DBili  x   /  AST  65<H>  /  ALT  74<H>  /  AlkPhos  66  12-16    PT/INR - ( 16 Dec 2023 01:20 )   PT: 11.8 sec;   INR: 1.05 ratio           Urinalysis Basic - ( 16 Dec 2023 01:20 )    Color: x / Appearance: x / SG: x / pH: x  Gluc: 197 mg/dL / Ketone: x  / Bili: x / Urobili: x   Blood: x / Protein: x / Nitrite: x   Leuk Esterase: x / RBC: x / WBC x   Sq Epi: x / Non Sq Epi: x / Bacteria: x        RADIOLOGY & ADDITIONAL TESTS: Reviewed. INTERVAL HPI/OVERNIGHT EVENTS: Transitioned off insilin gtt with lantus 10u and 3 premeals.  Patient waking up.     HPI: Mr. Campbell is a 89yo man w/ HTN and DM who was transferred from [facility] after cardiac arrest for further care,     Pt is from the , [place] not receive medical [place] in the [place]. He is visiting his daughter. Per his daughter, this morning he had a event of LOC that lasted a few minutes where his daughter found him on the ground, unresponsive, w/ gaze deviation and rigid. She is unsure if there was any seizure, but denies any hx of seizures or neurological issues. He regained consciousness spontaneously and was able to recognize his daughter. He was brought to [facility] where he was found to be afebrile w/ normal BP, and tachycardic w/ AFib With rates in the 140-150s. While at [facility] pt denied CP, SOB, abd pain, c/d/n/v, LE swelling. While in the ED at [place] he had an acute episode of reported gaze deviation and unresponsiveness. During the episode he became hypoxic, He then lost a pulse and ACLS was started w/ ROSC after 8min with one round of epi. He was intubated and started on prop and levophed. CTH showed no bleed.     Transferred to [facility] for further cardiac eval. Cardiology here at [facility] had low suspicion for Cardiac etiology for Cardiac arrest , recommended MICU consult. EKG notable for PVCs , no ST elevation / no ST depression.        SUBJECTIVE: Patient seen and examined at bedside.     ROS: Unable to assess as patient intubated     OBJECTIVE:    VITAL SIGNS:  ICU Vital Signs Last 24 Hrs  T(C): 38 (16 Dec 2023 08:00), Max: 39 (15 Dec 2023 20:00)  T(F): 100.4 (16 Dec 2023 08:00), Max: 102.2 (15 Dec 2023 20:00)  HR: 79 (16 Dec 2023 09:00) (60 - 83)  BP: --  BP(mean): --  ABP: 155/63 (16 Dec 2023 09:00) (109/45 - 155/63)  ABP(mean): 98 (16 Dec 2023 09:00) (69 - 98)  RR: 18 (16 Dec 2023 09:00) (18 - 18)  SpO2: 100% (16 Dec 2023 09:00) (98% - 100%)    O2 Parameters below as of 16 Dec 2023 09:00  Patient On (Oxygen Delivery Method): ventilator    O2 Concentration (%): 30      Mode: AC/ CMV (Assist Control/ Continuous Mandatory Ventilation), RR (machine): 18, TV (machine): 420, FiO2: 30, PEEP: 5, ITime: 0.67, MAP: 9, PIP: 20    12-15 @ 07:01  -  12-16 @ 07:00  --------------------------------------------------------  IN: 2396.3 mL / OUT: 1685 mL / NET: 711.3 mL    12-16 @ 07:01  -  12-16 @ 10:09  --------------------------------------------------------  IN: 119 mL / OUT: 160 mL / NET: -41 mL      CAPILLARY BLOOD GLUCOSE  123 (14 Dec 2023 18:00)      POCT Blood Glucose.: 131 mg/dL (16 Dec 2023 07:06)      PHYSICAL EXAM:    GENERAL: NAD, lying in bed comfortably  HEAD:  Atraumatic, normocephalic  EYES: EOMI, PERRLA, conjunctiva and sclera clear  NECK: Supple, trachea midline, no JVD  HEART: Regular rate and rhythm, no murmurs, rubs, or gallops  LUNGS: Unlabored respirations.  Clear to auscultation bilaterally, no crackles, wheezing, or rhonchi  ABDOMEN: Soft, nontender, nondistended, +BS  EXTREMITIES: 2+ peripheral pulses bilaterally, cap refill<2 secs. No clubbing, cyanosis, or edema  NERVOUS SYSTEM: intubated waking up following commands in Azeri, moving all extremities   SKIN: No rashes or lesions      MEDICATIONS:  MEDICATIONS  (STANDING):  chlorhexidine 2% Cloths 1 Application(s) Topical two times a day  dextrose 5%. 1000 milliLiter(s) (50 mL/Hr) IV Continuous <Continuous>  dextrose 50% Injectable 25 Gram(s) IV Push once  glucagon  Injectable 1 milliGRAM(s) IntraMuscular once  heparin   Injectable 5000 Unit(s) SubCutaneous every 8 hours  influenza  Vaccine (HIGH DOSE) 0.7 milliLiter(s) IntraMuscular once  insulin lispro (ADMELOG) corrective regimen sliding scale   SubCutaneous every 6 hours  norepinephrine Infusion 0.05 MICROgram(s)/kG/Min (5.81 mL/Hr) IV Continuous <Continuous>  petrolatum Ophthalmic Ointment 1 Application(s) Both EYES two times a day  piperacillin/tazobactam IVPB.. 3.375 Gram(s) IV Intermittent every 12 hours    MEDICATIONS  (PRN):  dextrose Oral Gel 15 Gram(s) Oral once PRN Blood Glucose LESS THAN 70 milliGRAM(s)/deciliter      ALLERGIES:  Allergies    No Known Allergies    Intolerances        LABS:                        7.6    7.23  )-----------( 192      ( 16 Dec 2023 01:20 )             21.8     12-16    135  |  106  |  25<H>  ----------------------------<  197<H>  3.8   |  16<L>  |  2.05<H>    Ca    9.0      16 Dec 2023 01:20  Phos  3.6     12-16  Mg     2.00     12-16    TPro  6.1  /  Alb  3.1<L>  /  TBili  0.4  /  DBili  x   /  AST  65<H>  /  ALT  74<H>  /  AlkPhos  66  12-16    PT/INR - ( 16 Dec 2023 01:20 )   PT: 11.8 sec;   INR: 1.05 ratio           Urinalysis Basic - ( 16 Dec 2023 01:20 )    Color: x / Appearance: x / SG: x / pH: x  Gluc: 197 mg/dL / Ketone: x  / Bili: x / Urobili: x   Blood: x / Protein: x / Nitrite: x   Leuk Esterase: x / RBC: x / WBC x   Sq Epi: x / Non Sq Epi: x / Bacteria: x        RADIOLOGY & ADDITIONAL TESTS: Reviewed. INTERVAL HPI/OVERNIGHT EVENTS: Transitioned off insilin gtt with lantus 10u and 3 premeals.  Patient waking up. Fever 102.2 overnight     HPI: Mr. Campbell is a 87yo man w/ HTN and DM who was transferred from [facility] after cardiac arrest for further care,     Pt is from the , [place] not [place] medical [place] in the [place]. He is visiting his daughter. Per his daughter, this morning he had a event of LOC that lasted a few minutes where his daughter found him on the ground, unresponsive, w/ gaze deviation and rigid. She is unsure if there was any seizure, but denies any hx of seizures or neurological issues. He regained consciousness spontaneously and was able to recognize his daughter. He was brought to [facility] where he was found to be afebrile w/ normal BP, and tachycardic w/ AFib With rates in the 140-150s. While at [place] pt denied CP, SOB, abd pain, c/d/n/v, LE swelling. While in the ED at [place] he had an acute episode of reported gaze deviation and unresponsiveness. During the episode he became hypoxic, He then lost a pulse and ACLS was started w/ ROSC after 8min with one round of epi. He was intubated and started on prop and levophed. CTH showed no bleed.     Transferred to [facility] for further cardiac eval. Cardiology here at [facility] had low suspicion for Cardiac etiology for Cardiac arrest , recommended MICU consult. EKG notable for PVCs , no ST elevation / no ST depression.        SUBJECTIVE: Patient seen and examined at bedside.     ROS: Unable to assess as patient intubated     OBJECTIVE:    VITAL SIGNS:  ICU Vital Signs Last 24 Hrs  T(C): 38 (16 Dec 2023 08:00), Max: 39 (15 Dec 2023 20:00)  T(F): 100.4 (16 Dec 2023 08:00), Max: 102.2 (15 Dec 2023 20:00)  HR: 79 (16 Dec 2023 09:00) (60 - 83)  BP: --  BP(mean): --  ABP: 155/63 (16 Dec 2023 09:00) (109/45 - 155/63)  ABP(mean): 98 (16 Dec 2023 09:00) (69 - 98)  RR: 18 (16 Dec 2023 09:00) (18 - 18)  SpO2: 100% (16 Dec 2023 09:00) (98% - 100%)    O2 Parameters below as of 16 Dec 2023 09:00  Patient On (Oxygen Delivery Method): ventilator    O2 Concentration (%): 30      Mode: AC/ CMV (Assist Control/ Continuous Mandatory Ventilation), RR (machine): 18, TV (machine): 420, FiO2: 30, PEEP: 5, ITime: 0.67, MAP: 9, PIP: 20    12-15 @ 07:01  -  12-16 @ 07:00  --------------------------------------------------------  IN: 2396.3 mL / OUT: 1685 mL / NET: 711.3 mL    12-16 @ 07:01  -  12-16 @ 10:09  --------------------------------------------------------  IN: 119 mL / OUT: 160 mL / NET: -41 mL      CAPILLARY BLOOD GLUCOSE  123 (14 Dec 2023 18:00)      POCT Blood Glucose.: 131 mg/dL (16 Dec 2023 07:06)      PHYSICAL EXAM:    GENERAL: NAD, lying in bed comfortably  HEAD:  Atraumatic, normocephalic  EYES: EOMI, PERRLA, conjunctiva and sclera clear  NECK: Supple, trachea midline, no JVD  HEART: Regular rate and rhythm, no murmurs, rubs, or gallops  LUNGS: Unlabored respirations.  Clear to auscultation bilaterally, no crackles, wheezing, or rhonchi  ABDOMEN: Soft, nontender, nondistended, +BS  EXTREMITIES: 2+ peripheral pulses bilaterally, cap refill<2 secs. No clubbing, cyanosis, or edema  NERVOUS SYSTEM: intubated waking up following commands in Vietnamese, moving all extremities   SKIN: No rashes or lesions      MEDICATIONS:  MEDICATIONS  (STANDING):  chlorhexidine 2% Cloths 1 Application(s) Topical two times a day  dextrose 5%. 1000 milliLiter(s) (50 mL/Hr) IV Continuous <Continuous>  dextrose 50% Injectable 25 Gram(s) IV Push once  glucagon  Injectable 1 milliGRAM(s) IntraMuscular once  heparin   Injectable 5000 Unit(s) SubCutaneous every 8 hours  influenza  Vaccine (HIGH DOSE) 0.7 milliLiter(s) IntraMuscular once  insulin lispro (ADMELOG) corrective regimen sliding scale   SubCutaneous every 6 hours  norepinephrine Infusion 0.05 MICROgram(s)/kG/Min (5.81 mL/Hr) IV Continuous <Continuous>  petrolatum Ophthalmic Ointment 1 Application(s) Both EYES two times a day  piperacillin/tazobactam IVPB.. 3.375 Gram(s) IV Intermittent every 12 hours    MEDICATIONS  (PRN):  dextrose Oral Gel 15 Gram(s) Oral once PRN Blood Glucose LESS THAN 70 milliGRAM(s)/deciliter      ALLERGIES:  Allergies    No Known Allergies    Intolerances        LABS:                        7.6    7.23  )-----------( 192      ( 16 Dec 2023 01:20 )             21.8     12-16    135  |  106  |  25<H>  ----------------------------<  197<H>  3.8   |  16<L>  |  2.05<H>    Ca    9.0      16 Dec 2023 01:20  Phos  3.6     12-16  Mg     2.00     12-16    TPro  6.1  /  Alb  3.1<L>  /  TBili  0.4  /  DBili  x   /  AST  65<H>  /  ALT  74<H>  /  AlkPhos  66  12-16    PT/INR - ( 16 Dec 2023 01:20 )   PT: 11.8 sec;   INR: 1.05 ratio           Urinalysis Basic - ( 16 Dec 2023 01:20 )    Color: x / Appearance: x / SG: x / pH: x  Gluc: 197 mg/dL / Ketone: x  / Bili: x / Urobili: x   Blood: x / Protein: x / Nitrite: x   Leuk Esterase: x / RBC: x / WBC x   Sq Epi: x / Non Sq Epi: x / Bacteria: x        RADIOLOGY & ADDITIONAL TESTS: Reviewed. INTERVAL HPI/OVERNIGHT EVENTS: Transitioned off insilin gtt with lantus 10u and 3 premeals.  Patient waking up. Fever 102.2 overnight     HPI: Mr. Campbell is a 89yo man w/ HTN and DM who was transferred from [facility] after cardiac arrest for further care,     Pt is from the , [place] medical [place] in the [place]. He is visiting his daughter. Per his daughter, this morning he had a event of LOC that lasted a few minutes where his daughter found him on the ground, unresponsive, w/ gaze deviation and rigid. She is unsure if there was any seizure, but denies any hx of seizures or neurological issues. He regained consciousness spontaneously and was able to recognize his daughter. He was brought to [facility] where he was found to be afebrile w/ normal BP, and tachycardic w/ AFib With rates in the 140-150s. While at [place] pt denied CP, SOB, abd pain, c/d/n/v, LE swelling. While in the ED at [place] he had an acute episode of reported gaze deviation and unresponsiveness. During the episode he became hypoxic, He then lost a pulse and ACLS was started w/ ROSC after 8min with one round of epi. He was intubated and started on prop and levophed. CTH showed no bleed.     Transferred to [facility] for further cardiac eval. Cardiology here at [facility] had low suspicion for Cardiac etiology for Cardiac arrest , recommended MICU consult. EKG notable for PVCs , no ST elevation / no ST depression.        SUBJECTIVE: Patient seen and examined at bedside.     ROS: Unable to assess as patient intubated     OBJECTIVE:    VITAL SIGNS:  ICU Vital Signs Last 24 Hrs  T(C): 38 (16 Dec 2023 08:00), Max: 39 (15 Dec 2023 20:00)  T(F): 100.4 (16 Dec 2023 08:00), Max: 102.2 (15 Dec 2023 20:00)  HR: 79 (16 Dec 2023 09:00) (60 - 83)  BP: --  BP(mean): --  ABP: 155/63 (16 Dec 2023 09:00) (109/45 - 155/63)  ABP(mean): 98 (16 Dec 2023 09:00) (69 - 98)  RR: 18 (16 Dec 2023 09:00) (18 - 18)  SpO2: 100% (16 Dec 2023 09:00) (98% - 100%)    O2 Parameters below as of 16 Dec 2023 09:00  Patient On (Oxygen Delivery Method): ventilator    O2 Concentration (%): 30      Mode: AC/ CMV (Assist Control/ Continuous Mandatory Ventilation), RR (machine): 18, TV (machine): 420, FiO2: 30, PEEP: 5, ITime: 0.67, MAP: 9, PIP: 20    12-15 @ 07:01  -  12-16 @ 07:00  --------------------------------------------------------  IN: 2396.3 mL / OUT: 1685 mL / NET: 711.3 mL    12-16 @ 07:01  -  12-16 @ 10:09  --------------------------------------------------------  IN: 119 mL / OUT: 160 mL / NET: -41 mL      CAPILLARY BLOOD GLUCOSE  123 (14 Dec 2023 18:00)      POCT Blood Glucose.: 131 mg/dL (16 Dec 2023 07:06)      PHYSICAL EXAM:    GENERAL: NAD, lying in bed comfortably  HEAD:  Atraumatic, normocephalic  EYES: EOMI, PERRLA, conjunctiva and sclera clear  NECK: Supple, trachea midline, no JVD  HEART: Regular rate and rhythm, no murmurs, rubs, or gallops  LUNGS: Unlabored respirations.  Clear to auscultation bilaterally, no crackles, wheezing, or rhonchi  ABDOMEN: Soft, nontender, nondistended, +BS  EXTREMITIES: 2+ peripheral pulses bilaterally, cap refill<2 secs. No clubbing, cyanosis, or edema  NERVOUS SYSTEM: intubated waking up following commands in Korean, moving all extremities   SKIN: No rashes or lesions      MEDICATIONS:  MEDICATIONS  (STANDING):  chlorhexidine 2% Cloths 1 Application(s) Topical two times a day  dextrose 5%. 1000 milliLiter(s) (50 mL/Hr) IV Continuous <Continuous>  dextrose 50% Injectable 25 Gram(s) IV Push once  glucagon  Injectable 1 milliGRAM(s) IntraMuscular once  heparin   Injectable 5000 Unit(s) SubCutaneous every 8 hours  influenza  Vaccine (HIGH DOSE) 0.7 milliLiter(s) IntraMuscular once  insulin lispro (ADMELOG) corrective regimen sliding scale   SubCutaneous every 6 hours  norepinephrine Infusion 0.05 MICROgram(s)/kG/Min (5.81 mL/Hr) IV Continuous <Continuous>  petrolatum Ophthalmic Ointment 1 Application(s) Both EYES two times a day  piperacillin/tazobactam IVPB.. 3.375 Gram(s) IV Intermittent every 12 hours    MEDICATIONS  (PRN):  dextrose Oral Gel 15 Gram(s) Oral once PRN Blood Glucose LESS THAN 70 milliGRAM(s)/deciliter      ALLERGIES:  Allergies    No Known Allergies    Intolerances        LABS:                        7.6    7.23  )-----------( 192      ( 16 Dec 2023 01:20 )             21.8     12-16    135  |  106  |  25<H>  ----------------------------<  197<H>  3.8   |  16<L>  |  2.05<H>    Ca    9.0      16 Dec 2023 01:20  Phos  3.6     12-16  Mg     2.00     12-16    TPro  6.1  /  Alb  3.1<L>  /  TBili  0.4  /  DBili  x   /  AST  65<H>  /  ALT  74<H>  /  AlkPhos  66  12-16    PT/INR - ( 16 Dec 2023 01:20 )   PT: 11.8 sec;   INR: 1.05 ratio           Urinalysis Basic - ( 16 Dec 2023 01:20 )    Color: x / Appearance: x / SG: x / pH: x  Gluc: 197 mg/dL / Ketone: x  / Bili: x / Urobili: x   Blood: x / Protein: x / Nitrite: x   Leuk Esterase: x / RBC: x / WBC x   Sq Epi: x / Non Sq Epi: x / Bacteria: x        RADIOLOGY & ADDITIONAL TESTS: Reviewed.

## 2023-12-16 NOTE — PROGRESS NOTE ADULT - CRITICAL CARE ATTENDING COMMENT
Patient seen and examined. Patient critically ill requiring frequent bedside visits with therapy change.     Patient is an 88M from [place], [place], with a history of HTN and DM2 on SGLT-2 inhibitor who presented with unclear neurologic symptoms (transient unresponsiveness with recovery) that reportedly had an inconclusive workup in his home country and when it happened again here he was brought to ED. While in the ED he had a cardiac arrest (PEA) and Afib requiring ACLS and epi with ROSC. His initial EKG was abnormal and was transferred to [facility] for further cardiology evaluation. He was evaluated by cardiology and did not require acute intervention and was admitted to MICU for further management and care.    His course has been complicated by finding of severe AS and euglycemic DKA. He has been on an insulin drip which was transitioned off earlier this AM.    #Neurology - follow-up EEG read  - Patient moving all extremities and following commands in [place] of [place]  - Will wean Precedex with hopes for an extubation attempt if no encephalopathy/unresponsiveness  - CTH without acute findings  - Follow-up neurologic status - he is able to move both upper extremities but is moving his RUE more consistently and with more fine movements. From report of family patient has been using his LUE less frequently for months though unknown why  #Cardiovascular - not presently in shock. Maintain MAP > 65  - Severe AS on echo - monitor fluid status and diurese as needed. Will need cardiac evaluation once more stable  - ST changes - no events on telemetry thus far. Cardiology evaluation noted.   #Pulmonary - acute hypoxemic respiratory failure s/p intubation in setting of cardiac arrest  - Wean as able with goal for extubation attempt  - Maintain O2 sat > 90%  #Gastro - oropharyngeal dysphagia currently receiving OGT feeds  - Hold feeds and suction stomach in preparation for extubation  #Nephrology - acute kidney injury improving  - Unclear baseline but he is making urine  - US with atrophic R kidney - repeat imaging as outpatient. Suspect some element of chronic kidney disease given US findings however no prior records available for review  #Endocrine - euglycemic DKA previously on insulin drip  - Transitioned to Lantus this AM - monitor FS and continue basal/bolus insulin  - BHB improved. If AG increases again will need Endocrine evaluation for assistance  - Hold SGLT-2 inhibitor  - Monitor serum bicarb  #Infectious Disease - continue Zosyn and follow-up cultures in lab  - Fevers possibly related to aspiration pneumonia  - Lactate 0.8 (not elevated)  #Proph - DVT proph on HSQ    Patient remains FULL CODE. Will aim for extubation attempt today with reintubation if needed. Prognosis guarded though patient has shown clinical improvement over the last 48 hours. Patient seen and examined. Patient critically ill requiring frequent bedside visits with therapy change.     Patient is an 88M from [place], [place], with a history of HTN and DM2 on SGLT-2 inhibitor who presented with unclear neurologic symptoms (transient unresponsiveness with recovery) that reportedly had an inconclusive workup in his home country and when it happened again here he was brought to ED. While in the ED he had a cardiac arrest (PEA) and Afib requiring ACLS and epi with ROSC. His initial EKG was abnormal and was transferred to [facility] for further cardiology evaluation. He was evaluated by cardiology and did not require acute intervention and was admitted to MICU for further management and care.    His course has been complicated by finding of severe AS and euglycemic DKA. He has been on an insulin drip which was transitioned off earlier this AM.    #Neurology - follow-up EEG read  - Patient moving all extremities and following commands in Citizen of [place]  - Will wean Precedex with hopes for an extubation attempt if no encephalopathy/unresponsiveness  - CTH without acute findings  - Follow-up neurologic status - he is able to move both upper extremities but is moving his RUE more consistently and with more fine movements. From report of family patient has been using his LUE less frequently for months though unknown why  #Cardiovascular - not presently in shock. Maintain MAP > 65  - Severe AS on echo - monitor fluid status and diurese as needed. Will need cardiac evaluation once more stable  - ST changes - no events on telemetry thus far. Cardiology evaluation noted.   #Pulmonary - acute hypoxemic respiratory failure s/p intubation in setting of cardiac arrest  - Wean as able with goal for extubation attempt  - Maintain O2 sat > 90%  #Gastro - oropharyngeal dysphagia currently receiving OGT feeds  - Hold feeds and suction stomach in preparation for extubation  #Nephrology - acute kidney injury improving  - Unclear baseline but he is making urine  - US with atrophic R kidney - repeat imaging as outpatient. Suspect some element of chronic kidney disease given US findings however no prior records available for review  #Endocrine - euglycemic DKA previously on insulin drip  - Transitioned to Lantus this AM - monitor FS and continue basal/bolus insulin  - BHB improved. If AG increases again will need Endocrine evaluation for assistance  - Hold SGLT-2 inhibitor  - Monitor serum bicarb  #Infectious Disease - continue Zosyn and follow-up cultures in lab  - Fevers possibly related to aspiration pneumonia  - Lactate 0.8 (not elevated)  #Proph - DVT proph on HSQ    Patient remains FULL CODE. Will aim for extubation attempt today with reintubation if needed. Prognosis guarded though patient has shown clinical improvement over the last 48 hours.

## 2023-12-16 NOTE — PROGRESS NOTE ADULT - ASSESSMENT
Mr. Campbell is a 89yo man w/ HTN and DM who presented to [facility] after episode of LOC found to be in AFib  c/b bradycardic/PEA arrest w/ ROSC after 8 minutes with 1 epi,  now transferred to [facility] for cardiac eval. Low suspicion for cardiac etiology for cardiac arrest. Patient transferred to MICU for higher level of care.       Plan:    =======Neuro=======   #intubated/sedated:  -currently intubated on RR18, FIO2:30   - d/c prop  - Precedex continued  - Neuron specific antigen lab ordered for AM 12/16  - EEG prelim report negative for seizure  - CTH w/o acute findings cont w/EEG r/o seizures    =======Resp=======  # Asp Pnu   - intubated vol A/C 18/420/5/30%  - monitor ABG   - empiric Zosyn     =======CV=======  #HTN  #cardiac arrest  # Severe AS   # Shock likely Vasoplegic sec to sedation  [ ] TTE severe aortic stenosis, EF 56%  [ ] Cardiac consult; TAVR/ Ischemic w/u once extubated  [ ] Amlodipine 5mg for HTN   [ ] on levophed @ 0.01 wean as tolerated   [ ] LE doppler negative DVT  [ ] pt not a candidate for midodrine d/t AS  - Verify home medications with daughter    =======GI=======  #No acute issue   # Mild Transaminitis post arrest  - started TF Glucerna 1.5  - monitor LFTs     =======Renal=======  #ALEXI 2/2 shock   - U/S left lower renal lesion. Asymmetrically atrophic right kidney.   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  - Insulin Gtt @ 2.5 U/hr cont to monitor FS q1h + D5@ 30ml/hr  - Beta hydroxybutyrate labs q6h reassess insulin gtt when normal  - NPO w/ TF   - A1c 5.6  - Hold home SGLT-2     =======ID=======  #asp pnu   - 12/13 Empiric Zosyn 7d course  [ ] bcx no growth to date  [ ] ua negative     =======Heme=======  - SQH    =======Ethics=======   full code  Cont GOC    Mr. Campbell is a 89yo man w/ HTN and DM who presented to [facility] after episode of LOC found to be in AFib  c/b bradycardic/PEA arrest w/ ROSC after 8 minutes with 1 epi,  now transferred to [facility] for cardiac eval. Low suspicion for cardiac etiology for cardiac arrest. Patient transferred to MICU for higher level of care.       Plan:    =======Neuro=======   #intubated/sedated:  -currently intubated on RR18, FIO2:30   - d/c prop  - Precedex continued  - Neuron specific antigen lab ordered for AM 12/16  - EEG prelim report negative for seizure  - CTH w/o acute findings cont w/EEG r/o seizures    =======Resp=======  # Asp Pnu   - intubated vol A/C 18/420/5/30%  - monitor ABG   - empiric Zosyn     =======CV=======  #HTN  #cardiac arrest  # Severe AS   # Shock likely Vasoplegic sec to sedation  [ ] TTE severe aortic stenosis, EF 56%  [ ] Cardiac consult; TAVR/ Ischemic w/u once extubated  [ ] Amlodipine 5mg for HTN   [ ] on levophed @ 0.01 wean as tolerated   [ ] LE doppler negative DVT  [ ] pt not a candidate for midodrine d/t AS  - Verify home medications with daughter    =======GI=======  #No acute issue   # Mild Transaminitis post arrest  - started TF Glucerna 1.5  - monitor LFTs     =======Renal=======  #ALEXI 2/2 shock   - U/S left lower renal lesion. Asymmetrically atrophic right kidney.   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  - Insulin Gtt @ 2.5 U/hr cont to monitor FS q1h + D5@ 30ml/hr  - Beta hydroxybutyrate labs q6h reassess insulin gtt when normal  - NPO w/ TF   - A1c 5.6  - Hold home SGLT-2     =======ID=======  #asp pnu   - 12/13 Empiric Zosyn 7d course  [ ] bcx no growth to date  [ ] ua negative     =======Heme=======  - SQH    =======Ethics=======   full code  Cont GOC    Mr. Campbell is a 87yo man w/ HTN and DM who presented to [facility] after episode of LOC found to be in AFib  c/b bradycardic/PEA arrest w/ ROSC after 8 minutes with 1 epi,  now transferred to [facility] for cardiac eval. Low suspicion for cardiac etiology for cardiac arrest. Patient transferred to MICU for higher level of care.       Plan:    =======Neuro=======   -s/p intubated/sedation- extubated 12/16   - off all sedation   - patient awake following commands moving all extremities   - EEG neg for seizures can D/C  - CTH w/o acute findings    =======Resp=======  # Asp Pnu  #Intubated s/p cardiac arrest 12/13 extubated 12/16 to face tent   - currently on face tent 40%- titrate off as tolerated   - empiric Zosyn     =======CV=======  #HTN- patient on   #cardiac arrest 12/13 (PEA ROSC after 8 mins- pt became bradycardic w/ intermittent PVCs, but did not have any evidence of VT or VFib per the strips)  # Severe AS   # Shock likely Vasoplegic sec to sedation  - TTE severe aortic stenosis, EF 56%  - Cardiac consult; TAVR/ Ischemic w/u once extubated  - on levophed @ 0.01 wean as tolerated   - LE doppler negative DVT  - pt not a candidate for midodrine d/t AS      =======GI=======  #No acute issue   # Mild Transaminitis post arrest  - started TF Glucerna 1.5  - monitor LFTs     =======Renal=======  #ALEXI 2/2 shock   - U/S left lower renal lesion. Asymmetrically atrophic right kidney.   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  - Insulin Gtt @ 2.5 U/hr cont to monitor FS q1h + D5@ 30ml/hr  - Beta hydroxybutyrate labs q6h reassess insulin gtt when normal  - NPO w/ TF   - A1c 5.6  - Hold home SGLT-2     =======ID=======  #asp pnu   - 12/13 Empiric Zosyn 7d course  [ ] bcx no growth to date  [ ] ua negative     =======Heme=======  - SQH    =======Ethics=======   full code  Cont GOC    Mr. Campbell is a 89yo man w/ HTN and DM who presented to [facility] after episode of LOC found to be in AFib  c/b bradycardic/PEA arrest w/ ROSC after 8 minutes with 1 epi,  now transferred to [facility] for cardiac eval. Low suspicion for cardiac etiology for cardiac arrest. Patient transferred to MICU for higher level of care.       Plan:    =======Neuro=======   -s/p intubated/sedation- extubated 12/16   - off all sedation   - patient awake following commands moving all extremities   - EEG neg for seizures can D/C  - CTH w/o acute findings    =======Resp=======  # Asp Pnu  #Intubated s/p cardiac arrest 12/13 extubated 12/16 to face tent   - currently on face tent 40%- titrate off as tolerated   - empiric Zosyn     =======CV=======  #HTN- patient on   #cardiac arrest 12/13 (PEA ROSC after 8 mins- pt became bradycardic w/ intermittent PVCs, but did not have any evidence of VT or VFib per the strips)  # Severe AS   # Shock likely Vasoplegic sec to sedation  - TTE severe aortic stenosis, EF 56%  - Cardiac consult; TAVR/ Ischemic w/u once extubated  - on levophed @ 0.01 wean as tolerated   - LE doppler negative DVT  - pt not a candidate for midodrine d/t AS      =======GI=======  #No acute issue   # Mild Transaminitis post arrest  - started TF Glucerna 1.5  - monitor LFTs     =======Renal=======  #ALEXI 2/2 shock   - U/S left lower renal lesion. Asymmetrically atrophic right kidney.   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  - Insulin Gtt @ 2.5 U/hr cont to monitor FS q1h + D5@ 30ml/hr  - Beta hydroxybutyrate labs q6h reassess insulin gtt when normal  - NPO w/ TF   - A1c 5.6  - Hold home SGLT-2     =======ID=======  #asp pnu   - 12/13 Empiric Zosyn 7d course  [ ] bcx no growth to date  [ ] ua negative     =======Heme=======  - SQH    =======Ethics=======   full code  Cont GOC    Mr. Campbell is a 87yo man w/ HTN and DM who presented to [facility] after episode of LOC found to be in AFib  c/b bradycardic/PEA arrest w/ ROSC after 8 minutes with 1 epi,  now transferred to [facility] for cardiac eval. Low suspicion for cardiac etiology for cardiac arrest. Patient transferred to MICU for higher level of care.       Plan:    =======Neuro=======   -s/p intubated/sedation- extubated 12/16   - off all sedation   - patient awake following commands moving all extremities   - EEG neg for seizures can D/C  - CTH w/o acute findings    =======Resp=======  # Asp Pnu  #Intubated s/p cardiac arrest 12/13 extubated 12/16 to face tent   - currently on face tent 40%- titrate off as tolerated   - empiric Zosyn     =======CV=======  #HTN- patient on losartan/hydrochlorothiazide at home   #cardiac arrest 12/13 (PEA ROSC after 8 mins- pt became bradycardic w/ intermittent PVCs, but did not have any evidence of VT or VFib per the strips)  # Severe AS   # Shock likely Vasoplegic sec to sedation  - TTE severe aortic stenosis, EF 56%  - Cardiac consult; TAVR/ Ischemic w/u once extubated  - on levophed @ 0.01 wean as tolerated   - LE doppler negative DVT  - pt not a candidate for midodrine d/t AS      =======GI=======  #No acute issue   # Mild Transaminitis post arrest  - no oral access now as patient extubated  - S/S eval vs NGT     =======Renal=======  #ALEXI 2/2 shock - improving   - U/S left lower renal lesion. Asymmetrically atrophic right kidney.   - BUN/Cr 24/2.37 (unclear baseline), cont to monitor bicarb   - Renally dose meds/Avoid nephrotoxin agents   - Cedillo placed, monitor I/O & electrolytes   - f/u LL renal pole lesion 6mos o/p    =======Endo=======  #T2DM   # Euglycemic DKA ( SGLT-2 at home)   - A1c 5.6  - Hold home SGLT-2   - s/p insulin gtt/ dextrose  - transitioned 12/16 with 10u lantus   - may need to add premeals 3u humalog once on diet/ tube feeds     =======ID=======  #asp pnu  #Recurrent fevers - 102.2 overnight  - Empiric Zosyn (12/13- ) 7d course  -F/U Blood culture 12/16, 12/13 neg   - UA neg/ RVP neg     =======Heme=======  - SQH    =======Ethics=======   -full code   Mr. Campbell is a 87yo man w/ HTN and DM who presented to [facility] after episode of LOC found to be in AFib  c/b bradycardic/PEA arrest w/ ROSC after 8 minutes with 1 epi,  now transferred to [facility] for cardiac eval. Low suspicion for cardiac etiology for cardiac arrest. Patient transferred to MICU for higher level of care.       Plan:    =======Neuro=======   -s/p intubated/sedation- extubated 12/16   - off all sedation   - patient awake following commands moving all extremities   - EEG neg for seizures can D/C  - CTH w/o acute findings    =======Resp=======  # Asp Pnu  #Intubated s/p cardiac arrest 12/13 extubated 12/16 to face tent   - currently on face tent 40%- titrate off as tolerated   - empiric Zosyn     =======CV=======  #HTN- patient on losartan/hydrochlorothiazide at home   #cardiac arrest 12/13 (PEA ROSC after 8 mins- pt became bradycardic w/ intermittent PVCs, but did not have any evidence of VT or VFib per the strips)  # Severe AS   # Shock likely Vasoplegic sec to sedation  - TTE severe aortic stenosis, EF 56%  - Cardiac consult; TAVR/ Ischemic w/u once extubated  - on levophed @ 0.01 wean as tolerated   - LE doppler negative DVT  - pt not a candidate for midodrine d/t AS      =======GI=======  #No acute issue   # Mild Transaminitis post arrest  - no oral access now as patient extubated  - S/S eval vs NGT     =======Renal=======  #ALEXI 2/2 shock - improving   - U/S left lower renal lesion. Asymmetrically atrophic right kidney.   - BUN/Cr 24/2.37 (unclear baseline), cont to monitor bicarb   - Renally dose meds/Avoid nephrotoxin agents   - Cedillo placed, monitor I/O & electrolytes   - f/u LL renal pole lesion 6mos o/p    =======Endo=======  #T2DM   # Euglycemic DKA ( SGLT-2 at home)   - A1c 5.6  - Hold home SGLT-2   - s/p insulin gtt/ dextrose  - transitioned 12/16 with 10u lantus   - may need to add premeals 3u humalog once on diet/ tube feeds     =======ID=======  #asp pnu  #Recurrent fevers - 102.2 overnight  - Empiric Zosyn (12/13- ) 7d course  -F/U Blood culture 12/16, 12/13 neg   - UA neg/ RVP neg     =======Heme=======  - SQH    =======Ethics=======   -full code

## 2023-12-16 NOTE — EEG REPORT - NS EEG TEXT BOX
[facility]: [address], [place] [address], Phone: [phone]  [facility]: [address] [place] [address], Phone: [phone]  [facility]: [address] [place] [address], Phone: [phone]    Patient Name: KEE LOUIS    Age: 88 year, : 1935  MRN #: -, Jaeger: -[id number]  Referring Physician: -  EEG #: -    Study Date: 12/15/2023   Start Time: 8:00:22 AM      End Date: 2023         End Time: 07:50:54 AM     Study Duration: 24 HR    Study Information:    EEG Recording Technique:  The patient underwent continuous Video-EEG monitoring, using Telemetry System hardware on the XLTek Digital System. EEG and video data were stored on a computer hard drive with important events saved in digital archive files. The material was reviewed by a physician (electroencephalographer / epileptologist) on a daily basis. Jose and seizure detection algorithms were utilized and reviewed. An EEG Technician attended to the patient, and was available throughout daytime work hours.  The epilepsy center neurologist was available in person or on call 24-hours per day.    EEG Placement and Labeling of Electrodes:  The EEG was performed utilizing 20 channel referential EEG connections (coronal over temporal over parasagittal montage) using all standard 10-20 electrode placements with EKG, with additional electrodes placed in the inferior temporal region using the modified 10-10 montage electrode placements for elective admissions, or if deemed necessary. Recording was at a sampling rate of 256 samples per second per channel. Time synchronized digital video recording was done simultaneously with EEG recording. A low light infrared camera was used for low light recording.     History:  -Patient is a 88y old  Male who presents with a chief complaint of transfer for cardiac arrest (14 Dec 2023 08:03)    Medication  No Data.    Interpretation:    [[[Abbreviation Key:  PDR=alpha rhythm/posterior dominant rhythm. A-P=anterior posterior.  Amplitude: ‘very low’:<20; ‘low’:20-49; ‘medium’:; ‘high’:>150uV.  Persistence for periodic/rhythmic patterns (% of epoch) ‘rare’:<1%; ‘occasional’:1-10%; ‘frequent’:10-50%; ‘abundant’:50-90%; ‘continuous’:>90%.  Persistence for sporadic discharges: ‘rare’:<1/hr; ‘occasional’:1/min-1/hr; ‘frequent’:>1/min; ‘abundant’:>1/10 sec.  RPP=rhythmic and periodic patterns; GRDA=generalized rhythmic delta activity; FIRDA=frontal intermittent GRDA; LRDA=lateralized rhythmic delta activity; TIRDA=temporal intermittent rhythmic delta activity;  LPD=PLED=lateralized periodic discharges; GPD=generalized periodic discharges; BIPDs =bilateral independent periodic discharges; Mf=multifocal; SIRPDs=stimulus induced rhythmic, periodic, or ictal appearing discharges; BIRDs=brief potentially ictal rhythmic discharges >4 Hz, lasting .5-10s; PFA (paroxysmal bursts >13 Hz or =8 Hz <10s).  Modifiers: +F=with fast component; +S=with spike component; +R=with rhythmic component.  S-B=burst suppression pattern.  Max=maximal. N1-drowsy; N2-stage II sleep; N3-slow wave sleep. SSS/BETS=small sharp spikes/benign epileptiform transients of sleep. HV=hyperventilation; PS=photic stimulation]]]      Daily EEG Visual Analysis    FINDINGS:      Background:  Symmetry: symmetric  Continuous: There was generalized burst-suppression pattern (burst duration 1-3 seconds, interburst interval 2-4 seconds) during the first 2 hours of the recording.   PDR: In a brief wakefulness, symmetric, 6.5-7 Hz activity, with amplitude to 40 uV, that attenuated to eye opening.  Low amplitude frontal beta noted in wakefulness.  Reactivity: present  Voltage: normal, [defined typically between 20-150uV]  Anterior Posterior Gradient: present  Breach: absent    Background Slowing:  Generalized slowing: There was polymorphic theta and delta activity, at times rhythmic at 1-1.5 Hz (GRDA).  Focal slowing: none was present.    State Changes:   -Drowsiness was characterized by fragmentation, attenuation, and slowing of the background activity.    -Stage II sleep transients were not recorded.    Sporadic Epileptiform Discharges:   None    Rhythmic and Periodic Patterns (RPPs):  None     Electrographic and Electroclinical seizures:  None    Other Clinical Events:  None    Activation Procedures:   -Hyperventilation was not performed.    -Photic stimulation was not performed.    Artifacts:  Intermittent myogenic and movement artifacts were noted.    ECG:  The heart rate on single channel ECG was predominantly between 60-80 BPM.    EEG Summary / Classification:  Abnormal EEG in an encephalopathic patient.  -Generalized burst-suppression during the initial 2 hours of the recording with increased continuity, diffuse delta>theta slowing on the latter times.    EEG Impression / Clinical Correlate:  Abnormal EEG study.  Moderate to severe nonspecific diffuse or multifocal cerebral dysfunction, which improved to a mild-moderate diffuse cerebal dysfunction towards the end of the recording.   No epileptiform discharges or seizures were recorded.  ________________________________________    Sada eL MD  Research EEG Fellow, [facility]      Mathew Tabares MD  Attending Physician, [facility]  ------------------------------------  EEG Reading Room: [phone]  On Call Service After Hours: [phone]   [facility]: [address], [place] [address], Phone: [phone]  [facility]: [address] [place] [address], Phone: [phone]  [facility]: [address] [place] [address], Phone: [phone]    Patient Name: KEE LOUIS    Age: 88 year, : 1935  MRN #: -, Jaeger: -[id number]  Referring Physician: -  EEG #: -    Study Date: 12/15/2023   Start Time: 8:00:22 AM      End Date: 2023         End Time: 07:50:54 AM     Study Duration: 24 HR    Study Information:    EEG Recording Technique:  The patient underwent continuous Video-EEG monitoring, using Telemetry System hardware on the XLTek Digital System. EEG and video data were stored on a computer hard drive with important events saved in digital archive files. The material was reviewed by a physician (electroencephalographer / epileptologist) on a daily basis. Jose and seizure detection algorithms were utilized and reviewed. An EEG Technician attended to the patient, and was available throughout daytime work hours.  The epilepsy center neurologist was available in person or on call 24-hours per day.    EEG Placement and Labeling of Electrodes:  The EEG was performed utilizing 20 channel referential EEG connections (coronal over temporal over parasagittal montage) using all standard 10-20 electrode placements with EKG, with additional electrodes placed in the inferior temporal region using the modified 10-10 montage electrode placements for elective admissions, or if deemed necessary. Recording was at a sampling rate of 256 samples per second per channel. Time synchronized digital video recording was done simultaneously with EEG recording. A low light infrared camera was used for low light recording.     History:  -Patient is a 88y old  Male who presents with a chief complaint of transfer for cardiac arrest (14 Dec 2023 08:03)    Medication  No Data.    Interpretation:    [[[Abbreviation Key:  PDR=alpha rhythm/posterior dominant rhythm. A-P=anterior posterior.  Amplitude: ‘very low’:<20; ‘low’:20-49; ‘medium’:; ‘high’:>150uV.  Persistence for periodic/rhythmic patterns (% of epoch) ‘rare’:<1%; ‘occasional’:1-10%; ‘frequent’:10-50%; ‘abundant’:50-90%; ‘continuous’:>90%.  Persistence for sporadic discharges: ‘rare’:<1/hr; ‘occasional’:1/min-1/hr; ‘frequent’:>1/min; ‘abundant’:>1/10 sec.  RPP=rhythmic and periodic patterns; GRDA=generalized rhythmic delta activity; FIRDA=frontal intermittent GRDA; LRDA=lateralized rhythmic delta activity; TIRDA=temporal intermittent rhythmic delta activity;  LPD=PLED=lateralized periodic discharges; GPD=generalized periodic discharges; BIPDs =bilateral independent periodic discharges; Mf=multifocal; SIRPDs=stimulus induced rhythmic, periodic, or ictal appearing discharges; BIRDs=brief potentially ictal rhythmic discharges >4 Hz, lasting .5-10s; PFA (paroxysmal bursts >13 Hz or =8 Hz <10s).  Modifiers: +F=with fast component; +S=with spike component; +R=with rhythmic component.  S-B=burst suppression pattern.  Max=maximal. N1-drowsy; N2-stage II sleep; N3-slow wave sleep. SSS/BETS=small sharp spikes/benign epileptiform transients of sleep. HV=hyperventilation; PS=photic stimulation]]]      Daily EEG Visual Analysis    FINDINGS:      Background:  Symmetry: symmetric  Continuous: There was generalized burst-suppression pattern (burst duration 1-3 seconds, interburst interval 2-4 seconds) during the first 2 hours of the recording.   PDR: In a brief wakefulness, symmetric, 6.5-7 Hz activity, with amplitude to 40 uV, that attenuated to eye opening.  Low amplitude frontal beta noted in wakefulness.  Reactivity: present  Voltage: normal, [defined typically between 20-150uV]  Anterior Posterior Gradient: present  Breach: absent    Background Slowing:  Generalized slowing: There was polymorphic theta and delta activity, at times rhythmic at 1-1.5 Hz (GRDA).  Focal slowing: none was present.    State Changes:   -Drowsiness was characterized by fragmentation, attenuation, and slowing of the background activity.    -Stage II sleep transients were not recorded.    Sporadic Epileptiform Discharges:   None    Rhythmic and Periodic Patterns (RPPs):  None     Electrographic and Electroclinical seizures:  None    Other Clinical Events:  None    Activation Procedures:   -Hyperventilation was not performed.    -Photic stimulation was not performed.    Artifacts:  Intermittent myogenic and movement artifacts were noted.    ECG:  The heart rate on single channel ECG was predominantly between 60-80 BPM.    EEG Summary / Classification:  Abnormal EEG in an encephalopathic patient.  -Generalized burst-suppression during the initial 2 hours of the recording with increased continuity, diffuse delta>theta slowing on the latter times.    EEG Impression / Clinical Correlate:  Abnormal EEG study.  Moderate to severe nonspecific diffuse or multifocal cerebral dysfunction, which improved to a mild-moderate diffuse cerebal dysfunction towards the end of the recording.   No epileptiform discharges or seizures were recorded.  ________________________________________    Sada Le MD  Research EEG Fellow, [facility] [place]      Mathew Tabares MD  Attending Physician, [facility] [place]  ------------------------------------  EEG Reading Room: [phone]  On Call Service After Hours: [phone]

## 2023-12-17 NOTE — PROGRESS NOTE ADULT - CRITICAL CARE ATTENDING COMMENT
Patient seen and examined. Patient critically ill requiring frequent bedside visits with therapy change.     Patient is an 88M from [place], [place], with a history of HTN and DM2 on SGLT-2 inhibitor who presented with unclear neurologic symptoms (transient unresponsiveness with recovery) that reportedly had an inconclusive workup in [place] country and when it happened again here he was brought to ED. While in the ED he had a cardiac arrest (PEA) and Afib requiring ACLS and epi with ROSC. His initial EKG was abnormal and was transferred to [facility] for further cardiology evaluation. He was evaluated by cardiology and did not require acute intervention and was admitted to MICU for further management and care.    His course has been complicated by finding of severe AS and euglycemic DKA. He has been on an insulin drip which was transitioned off on 12/16 AM. He was extubated on 12/16 as well and is now saturating well on RA.    #Neurology - follow-up EEG read and if it remains negative will D/C EEG  - Patient moving all extremities and following commands in [place] - though LUE is more sluggish and with more difficulty with fine motor movements which has been ongoing for months per daughter at bedside  - Will benefit from MRI for further evaluation - on d/w daughter patient has no metal implants, no bullet fragments   - CTH without acute findings  - May need Neurology evaluation given long term unclear neurologic symptoms depending on EEG and MRI findings  #Cardiovascular - Shock state improved - vasopressors stopped around midnight  - Maintain MAP > 65  - Severe AS on echo - monitor fluid status and diurese as needed. Will need cardiac follow-up once more stable for ischemic evaluation and evaluation of valvular disease  - ST changes - no events on telemetry thus far. Cardiology evaluation noted.   #Pulmonary - acute hypoxemic respiratory failure s/p intubation in setting of cardiac arrest  - Extubated on 12/16 and saturating well on RA this AM. Maintain O2 sat > 90%  #Gastro - passed dysphagia screen and tolerating PO diet  #Nephrology - acute kidney injury improving  - Unclear baseline but he is making urine  - US with atrophic R kidney - repeat imaging as outpatient. Suspect some element of chronic kidney disease given US findings however no prior records available for review  #Endocrine - euglycemic DKA previously on insulin drip  - Endocrine evaluation as AG slightly increased and BHB borderline but patient without acidosis and serum bicarb improved. Will repeat labs and if AG or acidosis worsening may need to restart insulin drip.   - Hold SGLT-2 inhibitor  - Monitor serum bicarb  #Infectious Disease - continue Zosyn and follow-up cultures in lab  - prior fevers possibly related to aspiration pneumonia  - Lactate not elevated  #Proph - DVT proph on HSQ    Patient remains FULL CODE. Discussed with daughter at bedside who also helped provide translation to patient.  services deferred by family. Patient seen and examined. Patient critically ill requiring frequent bedside visits with therapy change.     Patient is an 88M from [place], [place], with a history of HTN and DM2 on SGLT-2 inhibitor who presented with unclear neurologic symptoms (transient unresponsiveness with recovery) that reportedly had an inconclusive workup in his [place] country and when it happened again here he was brought to ED. While in the ED he had a cardiac arrest (PEA) and Afib requiring ACLS and epi with ROSC. His initial EKG was abnormal and was transferred to [facility] for further cardiology evaluation. He was evaluated by cardiology and did not require acute intervention and was admitted to MICU for further management and care.    His course has been complicated by finding of severe AS and euglycemic DKA. He has been on an insulin drip which was transitioned off on 12/16 AM. He was extubated on 12/16 as well and is now saturating well on RA.    #Neurology - follow-up EEG read and if it remains negative will D/C EEG  - Patient moving all extremities and following commands in Mong[place]ian - though LUE is more sluggish and with more difficulty with fine motor movements which has been ongoing for months per daughter at bedside  - Will benefit from MRI for further evaluation - on d/w daughter patient has no metal implants, no bullet fragments   - CTH without acute findings  - May need Neurology evaluation given long term unclear neurologic symptoms depending on EEG and MRI findings  #Cardiovascular - Shock state improved - vasopressors stopped around midnight  - Maintain MAP > 65  - Severe AS on echo - monitor fluid status and diurese as needed. Will need cardiac follow-up once more stable for ischemic evaluation and evaluation of valvular disease  - ST changes - no events on telemetry thus far. Cardiology evaluation noted.   #Pulmonary - acute hypoxemic respiratory failure s/p intubation in setting of cardiac arrest  - Extubated on 12/16 and saturating well on RA this AM. Maintain O2 sat > 90%  #Gastro - passed dysphagia screen and tolerating PO diet  #Nephrology - acute kidney injury improving  - Unclear baseline but he is making urine  - US with atrophic R kidney - repeat imaging as outpatient. Suspect some element of chronic kidney disease given US findings however no prior records available for review  #Endocrine - euglycemic DKA previously on insulin drip  - Endocrine evaluation as AG slightly increased and BHB borderline but patient without acidosis and serum bicarb improved. Will repeat labs and if AG or acidosis worsening may need to restart insulin drip.   - Hold SGLT-2 inhibitor  - Monitor serum bicarb  #Infectious Disease - continue Zosyn and follow-up cultures in lab  - prior fevers possibly related to aspiration pneumonia  - Lactate not elevated  #Proph - DVT proph on HSQ    Patient remains FULL CODE. Discussed with daughter at bedside who also helped provide translation to patient.  services deferred by family.

## 2023-12-17 NOTE — PROGRESS NOTE ADULT - REASON FOR ADMISSION
transfer for cardiac arrest

## 2023-12-17 NOTE — EEG REPORT - NS EEG TEXT BOX
[facility]: [address], [place] [address], Phone: [phone]  [facility]: [address], [place] [address], Phone: [phone]  [facility]: [address] [place] [address], Phone: [phone]    Patient Name: KEE LOUIS    Age: 88 year, : 1935  MRN #: -, Jaeger: -[id number]  Referring Physician: -  EEG #: [id number]    Study Date: 2023   Start Time: 8:00:17 AM      End Date:   2023       End Time: 08:00:06 AM     Study Duration: 24 HR    Study Information:    EEG Recording Technique:  The patient underwent continuous Video-EEG monitoring, using Telemetry System hardware on the XLTek Digital System. EEG and video data were stored on a computer hard drive with important events saved in digital archive files. The material was reviewed by a physician (electroencephalographer / epileptologist) on a daily basis. Jose and seizure detection algorithms were utilized and reviewed. An EEG Technician attended to the patient, and was available throughout daytime work hours.  The epilepsy center neurologist was available in person or on call 24-hours per day.    EEG Placement and Labeling of Electrodes:  The EEG was performed utilizing 20 channel referential EEG connections (coronal over temporal over parasagittal montage) using all standard 10-20 electrode placements with EKG, with additional electrodes placed in the inferior temporal region using the modified 10-10 montage electrode placements for elective admissions, or if deemed necessary. Recording was at a sampling rate of 256 samples per second per channel. Time synchronized digital video recording was done simultaneously with EEG recording. A low light infrared camera was used for low light recording.     History:  -Patient is a 88y old  Male who presents with a chief complaint of transfer for cardiac arrest (14 Dec 2023 08:03)    Medication  No Data.    Interpretation:    [[[Abbreviation Key:  PDR=alpha rhythm/posterior dominant rhythm. A-P=anterior posterior.  Amplitude: ‘very low’:<20; ‘low’:20-49; ‘medium’:; ‘high’:>150uV.  Persistence for periodic/rhythmic patterns (% of epoch) ‘rare’:<1%; ‘occasional’:1-10%; ‘frequent’:10-50%; ‘abundant’:50-90%; ‘continuous’:>90%.  Persistence for sporadic discharges: ‘rare’:<1/hr; ‘occasional’:1/min-1/hr; ‘frequent’:>1/min; ‘abundant’:>1/10 sec.  RPP=rhythmic and periodic patterns; GRDA=generalized rhythmic delta activity; FIRDA=frontal intermittent GRDA; LRDA=lateralized rhythmic delta activity; TIRDA=temporal intermittent rhythmic delta activity;  LPD=PLED=lateralized periodic discharges; GPD=generalized periodic discharges; BIPDs =bilateral independent periodic discharges; Mf=multifocal; SIRPDs=stimulus induced rhythmic, periodic, or ictal appearing discharges; BIRDs=brief potentially ictal rhythmic discharges >4 Hz, lasting .5-10s; PFA (paroxysmal bursts >13 Hz or =8 Hz <10s).  Modifiers: +F=with fast component; +S=with spike component; +R=with rhythmic component.  S-B=burst suppression pattern.  Max=maximal. N1-drowsy; N2-stage II sleep; N3-slow wave sleep. SSS/BETS=small sharp spikes/benign epileptiform transients of sleep. HV=hyperventilation; PS=photic stimulation]]]      Daily EEG Visual Analysis    FINDINGS:      Background:  Symmetry: symmetric  Continuous: continuous  PDR: symmetric, well-modulated 6.5-7 Hz activity, with amplitude to 40 uV, that attenuated to eye opening.  Low amplitude frontal beta noted in wakefulness.  Reactivity: present  Voltage: normal, [defined typically between 20-150uV]  Anterior Posterior Gradient: present  Breach: absent    Background Slowing:  Generalized slowing: There was continuous generalized polymorphic theta>delta activity, at times rhythmic at 4-5 Hz, lasting 2-5 sec.  Focal slowing: none was present.    State Changes:   -Drowsiness was characterized by fragmentation, attenuation, and slowing of the background activity.    -N2 sleep transients with symmetric spindles and K-complexes.    Sporadic Epileptiform Discharges:   None    Rhythmic and Periodic Patterns (RPPs):  None     Electrographic and Electroclinical seizures:  None    Other Clinical Events:  None    Activation Procedures:   -Hyperventilation was not performed.    -Photic stimulation was not performed.    Artifacts:  Intermittent myogenic and movement artifacts were noted.    ECG:  The heart rate on single channel ECG was predominantly between 60-80 BPM.    EEG Summary / Classification:  Abnormal EEG in an encephalopathic patient.  Moderate nonspecific diffuse cerebral slowing    EEG Impression / Clinical Correlate:  Abnormal EEG study.  Moderate nonspecific diffuse or multifocal cerebral dysfunction  No epileptiform discharges or seizures were recorded.      Sada Le MD  Research EEG Fellow, [facility] Epilepsy [place]    Mathew Tabares MD  Attending Physician, [facility]    ------------------------------------  EEG Reading Room: [phone]  On Call Service After Hours: [phone]   [facility]: [address], [place] [address], Phone: [phone]  [facility]: [address], [place] [address], Phone: [phone]  [facility]: [address] [place] [address], Phone: [phone]    Patient Name: KEE LOUIS    Age: 88 year, : 1935  MRN #: -, Jaeger: -[id number]  Referring Physician: -  EEG #: [id number]    Study Date: 2023   Start Time: 8:00:17 AM      End Date:   2023       End Time: 08:00:06 AM     Study Duration: 24 HR    Study Information:    EEG Recording Technique:  The patient underwent continuous Video-EEG monitoring, using Telemetry System hardware on the XLTek Digital System. EEG and video data were stored on a computer hard drive with important events saved in digital archive files. The material was reviewed by a physician (electroencephalographer / epileptologist) on a daily basis. Jose and seizure detection algorithms were utilized and reviewed. An EEG Technician attended to the patient, and was available throughout daytime work hours.  The epilepsy center neurologist was available in person or on call 24-hours per day.    EEG Placement and Labeling of Electrodes:  The EEG was performed utilizing 20 channel referential EEG connections (coronal over temporal over parasagittal montage) using all standard 10-20 electrode placements with EKG, with additional electrodes placed in the inferior temporal region using the modified 10-10 montage electrode placements for elective admissions, or if deemed necessary. Recording was at a sampling rate of 256 samples per second per channel. Time synchronized digital video recording was done simultaneously with EEG recording. A low light infrared camera was used for low light recording.     History:  -Patient is a 88y old  Male who presents with a chief complaint of transfer for cardiac arrest (14 Dec 2023 08:03)    Medication  No Data.    Interpretation:    [[[Abbreviation Key:  PDR=alpha rhythm/posterior dominant rhythm. A-P=anterior posterior.  Amplitude: ‘very low’:<20; ‘low’:20-49; ‘medium’:; ‘high’:>150uV.  Persistence for periodic/rhythmic patterns (% of epoch) ‘rare’:<1%; ‘occasional’:1-10%; ‘frequent’:10-50%; ‘abundant’:50-90%; ‘continuous’:>90%.  Persistence for sporadic discharges: ‘rare’:<1/hr; ‘occasional’:1/min-1/hr; ‘frequent’:>1/min; ‘abundant’:>1/10 sec.  RPP=rhythmic and periodic patterns; GRDA=generalized rhythmic delta activity; FIRDA=frontal intermittent GRDA; LRDA=lateralized rhythmic delta activity; TIRDA=temporal intermittent rhythmic delta activity;  LPD=PLED=lateralized periodic discharges; GPD=generalized periodic discharges; BIPDs =bilateral independent periodic discharges; Mf=multifocal; SIRPDs=stimulus induced rhythmic, periodic, or ictal appearing discharges; BIRDs=brief potentially ictal rhythmic discharges >4 Hz, lasting .5-10s; PFA (paroxysmal bursts >13 Hz or =8 Hz <10s).  Modifiers: +F=with fast component; +S=with spike component; +R=with rhythmic component.  S-B=burst suppression pattern.  Max=maximal. N1-drowsy; N2-stage II sleep; N3-slow wave sleep. SSS/BETS=small sharp spikes/benign epileptiform transients of sleep. HV=hyperventilation; PS=photic stimulation]]]      Daily EEG Visual Analysis    FINDINGS:      Background:  Symmetry: symmetric  Continuous: continuous  PDR: symmetric, well-modulated 6.5-7 Hz activity, with amplitude to 40 uV, that attenuated to eye opening.  Low amplitude frontal beta noted in wakefulness.  Reactivity: present  Voltage: normal, [defined typically between 20-150uV]  Anterior Posterior Gradient: present  Breach: absent    Background Slowing:  Generalized slowing: There was continuous generalized polymorphic theta>delta activity, at times rhythmic at 4-5 Hz, lasting 2-5 sec.  Focal slowing: none was present.    State Changes:   -Drowsiness was characterized by fragmentation, attenuation, and slowing of the background activity.    -N2 sleep transients with symmetric spindles and K-complexes.    Sporadic Epileptiform Discharges:   None    Rhythmic and Periodic Patterns (RPPs):  None     Electrographic and Electroclinical seizures:  None    Other Clinical Events:  None    Activation Procedures:   -Hyperventilation was not performed.    -Photic stimulation was not performed.    Artifacts:  Intermittent myogenic and movement artifacts were noted.    ECG:  The heart rate on single channel ECG was predominantly between 60-80 BPM.    EEG Summary / Classification:  Abnormal EEG in an encephalopathic patient.  Moderate nonspecific diffuse cerebral slowing    EEG Impression / Clinical Correlate:  Abnormal EEG study.  Moderate nonspecific diffuse or multifocal cerebral dysfunction  No epileptiform discharges or seizures were recorded.      Sada Le MD  Research EEG Fellow, [facility] Epilepsy [place]    Mathew Tabares MD  Attending Physician, [facility]    ------------------------------------  EEG Reading Room: [phone]  On Call Service After Hours: [phone]

## 2023-12-17 NOTE — CHART NOTE - NSCHARTNOTEFT_GEN_A_CORE
Pt became confused,    __ [facility]  used [id number]  __   -- pt is confused, alert and oriented to his name, but not to place, date, or situation  -- possibly ICU delirium,     __ pt is moving all extremities, neurologically intact, except old L UE weakness present   __ pt following commands with the ,   __ hemodynamically stable, afebrile, as per nursing staff, pt did not sleep last night  __ Melatonin qhs and continue monitoring Pt became confused,    __ [facility]  used [id number]  __   -- pt is confused, alert and oriented to his name, but not to place, date, or situation  -- possibly ICU delirium,     __ pt is moving all extremities, neurologically intact, except old L UE weakness present   __ pt following commands with the ,   __ hemodynamically stable, afebrile, as per nursing staff, pt did not sleep last night  __ Melatonin qhs and continue monitoring

## 2023-12-17 NOTE — PROGRESS NOTE ADULT - SUBJECTIVE AND OBJECTIVE BOX
Significant recent/past 24 hr events:    Subjective:    Review of Systems         [ ] A ten-point review of systems was otherwise negative except as noted.  [ ] Due to altered mental status/intubation, subjective information were not able to be obtained from the patient. History was obtained, to the extent possible, from review of the chart and collateral sources of information.      Patient is a 88y old  Male who presents with a chief complaint of transfer for cardiac arrest (16 Dec 2023 10:08)    HPI:  Mr. Campbell is a 89yo man w/ HTN and DM who was transferred from [facility] after cardiac arrest for further care,     [place] is from the , [place] rec[place] medical [place] in the [place]. He is visiting his daughter. Per his daughter, this morning he had a event of LOC that lasted a few minutes where his daughter found him on the ground, unresponsive, w/ gaze deviation and rigid. She is unsure if there was any seizure, but denies any hx of seizures or neurological issues. He regained consciousness spontaneously and was able to recognize his daughter. He was brought to [facility] where he was found to be afebrile w/ normal BP, and tachycardic w/ AFib wih rates in the 140-150s. While at [place] pt denied CP, SOB, abd pain, c/d/n/v, LE swelling. While in the ED at [place] he had an acute episode of reported gaze deviation and unresponsiveness. During the episode he became hypoxic, He then lost a pulse and ACLS was started w/ ROSC after 8min with one round of epi. He was intubated and started on prop and levophed. CTH showed no bleed.     Labs there were notable for a leukocytosis, anemia, Cr 3.3, acidosis, HS-trop I 3300, w/ a ABG showed 7.16 / 31 / 429. CXR was relatively clear w/ no clear consolidation or signs of fluid overload. He was then transferred to [facility] for further cardiac eval. Cardiology here at [facility] had low suspicion for Cardiac etiology for Cardiac arrest , recommended MICU consult. EKG notable for PVCs , no ST elevation / no ST depression.       (13 Dec 2023 14:02)    PAST MEDICAL & SURGICAL HISTORY:  Hypertension      Diabetes      HLD (hyperlipidemia)      H/O appendicitis        FAMILY HISTORY:      Vitals   ICU Vital Signs Last 24 Hrs  T(C): 36.8 (17 Dec 2023 08:00), Max: 38.1 (16 Dec 2023 12:00)  T(F): 98.3 (17 Dec 2023 08:00), Max: 100.5 (16 Dec 2023 12:00)  HR: 102 (17 Dec 2023 07:00) (70 - 107)  BP: --  BP(mean): --  ABP: 131/55 (17 Dec 2023 07:00) (103/43 - 155/63)  ABP(mean): 88 (17 Dec 2023 07:00) (65 - 98)  RR: 21 (17 Dec 2023 07:00) (18 - 29)  SpO2: 100% (17 Dec 2023 07:00) (98% - 100%)    O2 Parameters below as of 17 Dec 2023 08:00  Patient On (Oxygen Delivery Method): room air            Physical Exam:   Constitutional: NAD, well-groomed, well-developed  HEENT: PERRLA, EOMI, no drainage or redness  Neck: supple,  No JVD, Trachea midline  Back: Normal spine flexure, No CVA tenderness, No deformity or limitation of movement  Respiratory: Breath Sounds equal & clear bilaterally to auscultation, no accessory muscle use noted  Cardiovascular: Regular rate, regular rhythm, normal S1, S2; no murmurs or rub  Gastrointestinal: Soft, non-tender, non distended, no hepatosplenomegaly, normal bowel sounds  Extremities: RG x 4, no peripheral edema, no cyanosis, no clubbing   Vascular: Equal and normal pulses: 2+ peripheral pulses throughout  Neurological: A+O x 3; speech clear and intact; no sensory, motor  deficits, normal reflexes  Psychiatric: calm, normal mood, normal affect  Musculoskeletal: No joint swelling or deformity; no limitation of movement  Skin: warm, dry, well perfused, no rashes    VENT SETTINGS     ABG - ( 17 Dec 2023 00:40 )  pH, Arterial: 7.42  pH, Blood: x     /  pCO2: 30    /  pO2: 111   / HCO3: 20    / Base Excess: -4.4  /  SaO2: 98.0                I&O's Detail    16 Dec 2023 07:01  -  17 Dec 2023 07:00  --------------------------------------------------------  IN:    Dexmedetomidine: 70.5 mL    Glucerna 1.5: 70 mL    IV PiggyBack: 550 mL    Norepinephrine: 11.7 mL  Total IN: 702.2 mL    OUT:    Indwelling Catheter - Urethral (mL): 1150 mL    Voided (mL): 500 mL  Total OUT: 1650 mL    Total NET: -947.8 mL          LABS                        7.7    6.17  )-----------( 221      ( 17 Dec 2023 00:40 )             23.3     12-17    137  |  105  |  23  ----------------------------<  82  3.6   |  17<L>  |  1.82<H>    Ca    9.1      17 Dec 2023 00:40  Phos  3.6     12-17  Mg     2.20     12-17    TPro  6.1  /  Alb  3.1<L>  /  TBili  0.4  /  DBili  x   /  AST  65<H>  /  ALT  74<H>  /  AlkPhos  66  12-16    LIVER FUNCTIONS - ( 16 Dec 2023 01:20 )  Alb: 3.1 g/dL / Pro: 6.1 g/dL / ALK PHOS: 66 U/L / ALT: 74 U/L / AST: 65 U/L / GGT: x           PT/INR - ( 17 Dec 2023 00:40 )   PT: 10.7 sec;   INR: 0.95 ratio                 Urinalysis Basic - ( 17 Dec 2023 00:40 )    Color: x / Appearance: x / SG: x / pH: x  Gluc: 82 mg/dL / Ketone: x  / Bili: x / Urobili: x   Blood: x / Protein: x / Nitrite: x   Leuk Esterase: x / RBC: x / WBC x   Sq Epi: x / Non Sq Epi: x / Bacteria: x      POCT Blood Glucose.: 94 mg/dL (12-17-23 @ 07:25)  POCT Blood Glucose.: 96 mg/dL (12-17-23 @ 05:36)  POCT Blood Glucose.: 100 mg/dL *H* (12-16-23 @ 23:12)  POCT Blood Glucose.: 115 mg/dL *H* (12-16-23 @ 17:31)  POCT Blood Glucose.: 74 mg/dL (12-16-23 @ 17:15)  POCT Blood Glucose.: 66 mg/dL *L* (12-16-23 @ 17:11)  POCT Blood Glucose.: 125 mg/dL *H* (12-16-23 @ 11:02)        MEDICATIONS  (STANDING):  chlorhexidine 2% Cloths 1 Application(s) Topical two times a day  dextrose 5%. 1000 milliLiter(s) (50 mL/Hr) IV Continuous <Continuous>  dextrose 50% Injectable 25 Gram(s) IV Push once  glucagon  Injectable 1 milliGRAM(s) IntraMuscular once  heparin   Injectable 5000 Unit(s) SubCutaneous every 8 hours  influenza  Vaccine (HIGH DOSE) 0.7 milliLiter(s) IntraMuscular once  insulin glargine Injectable (LANTUS) 5 Unit(s) SubCutaneous <User Schedule>  insulin lispro (ADMELOG) corrective regimen sliding scale   SubCutaneous every 6 hours  norepinephrine Infusion 0.05 MICROgram(s)/kG/Min (5.81 mL/Hr) IV Continuous <Continuous>  petrolatum Ophthalmic Ointment 1 Application(s) Both EYES two times a day  piperacillin/tazobactam IVPB.. 3.375 Gram(s) IV Intermittent every 12 hours    MEDICATIONS  (PRN):  dextrose Oral Gel 15 Gram(s) Oral once PRN Blood Glucose LESS THAN 70 milliGRAM(s)/deciliter      Allergies:  No Known Allergies        CRITICAL CARE TIME SPENT:  minutes of critical care time spent providing medical care for patient's acute illness/conditions that impairs at least one vital organ system and/or poses a high risk of imminent or life threatening deterioration in the patient's condition. It includes time spent evaluating and treating the patient's acute illness as well as time spent reviewing labs, radiology, discussing goals of care with patient and/or patient's family, and discussing the case with a multidisciplinary team, in an effort to prevent further life threatening deterioration or end organ damage. This time is independent of any procedures performed.         Significant recent/past 24 hr events:    Subjective:    Review of Systems         [ ] A ten-point review of systems was otherwise negative except as noted.  [ ] Due to altered mental status/intubation, subjective information were not able to be obtained from the patient. History was obtained, to the extent possible, from review of the chart and collateral sources of information.      Patient is a 88y old  Male who presents with a chief complaint of transfer for cardiac arrest (16 Dec 2023 10:08)    HPI:  Mr. Campbell is a 89yo man w/ HTN and DM who was transferred from [facility] after cardiac arrest for further care,     Pt is from the , [place] not rec[place] medical care in the [place]. He is visiting his daughter. Per his daughter, this morning he had a event of LOC that lasted a few minutes where his daughter found him on the ground, unresponsive, w/ gaze deviation and rigid. She is unsure if there was any seizure, but denies any hx of seizures or neurological issues. He regained consciousness spontaneously and was able to recognize his daughter. He was brought to [facility] where he was found to be afebrile w/ normal BP, and tachycardic w/ AFib wih rates in the 140-150s. While at [place] pt denied CP, SOB, abd pain, c/d/n/v, LE swelling. While in the ED at [place] he had an acute episode of reported gaze deviation and unresponsiveness. During the episode he became hypoxic, He then lost a pulse and ACLS was started w/ ROSC after 8min with one round of epi. He was intubated and started on prop and levophed. CTH showed no bleed.     Labs there were notable for a leukocytosis, anemia, Cr 3.3, acidosis, HS-trop I 3300, w/ a ABG showed 7.16 / 31 / 429. CXR was relatively clear w/ no clear consolidation or signs of fluid overload. He was then transferred to [facility] for further cardiac eval. Cardiology here at [facility] had low suspicion for Cardiac etiology for Cardiac arrest , recommended MICU consult. EKG notable for PVCs , no ST elevation / no ST depression.       (13 Dec 2023 14:02)    PAST MEDICAL & SURGICAL HISTORY:  Hypertension      Diabetes      HLD (hyperlipidemia)      H/O appendicitis        FAMILY HISTORY:      Vitals   ICU Vital Signs Last 24 Hrs  T(C): 36.8 (17 Dec 2023 08:00), Max: 38.1 (16 Dec 2023 12:00)  T(F): 98.3 (17 Dec 2023 08:00), Max: 100.5 (16 Dec 2023 12:00)  HR: 102 (17 Dec 2023 07:00) (70 - 107)  BP: --  BP(mean): --  ABP: 131/55 (17 Dec 2023 07:00) (103/43 - 155/63)  ABP(mean): 88 (17 Dec 2023 07:00) (65 - 98)  RR: 21 (17 Dec 2023 07:00) (18 - 29)  SpO2: 100% (17 Dec 2023 07:00) (98% - 100%)    O2 Parameters below as of 17 Dec 2023 08:00  Patient On (Oxygen Delivery Method): room air            Physical Exam:   Constitutional: NAD, well-groomed, well-developed  HEENT: PERRLA, EOMI, no drainage or redness  Neck: supple,  No JVD, Trachea midline  Back: Normal spine flexure, No CVA tenderness, No deformity or limitation of movement  Respiratory: Breath Sounds equal & clear bilaterally to auscultation, no accessory muscle use noted  Cardiovascular: Regular rate, regular rhythm, normal S1, S2; no murmurs or rub  Gastrointestinal: Soft, non-tender, non distended, no hepatosplenomegaly, normal bowel sounds  Extremities: RG x 4, no peripheral edema, no cyanosis, no clubbing   Vascular: Equal and normal pulses: 2+ peripheral pulses throughout  Neurological: A+O x 3; speech clear and intact; no sensory, motor  deficits, normal reflexes  Psychiatric: calm, normal mood, normal affect  Musculoskeletal: No joint swelling or deformity; no limitation of movement  Skin: warm, dry, well perfused, no rashes    VENT SETTINGS     ABG - ( 17 Dec 2023 00:40 )  pH, Arterial: 7.42  pH, Blood: x     /  pCO2: 30    /  pO2: 111   / HCO3: 20    / Base Excess: -4.4  /  SaO2: 98.0                I&O's Detail    16 Dec 2023 07:01  -  17 Dec 2023 07:00  --------------------------------------------------------  IN:    Dexmedetomidine: 70.5 mL    Glucerna 1.5: 70 mL    IV PiggyBack: 550 mL    Norepinephrine: 11.7 mL  Total IN: 702.2 mL    OUT:    Indwelling Catheter - Urethral (mL): 1150 mL    Voided (mL): 500 mL  Total OUT: 1650 mL    Total NET: -947.8 mL          LABS                        7.7    6.17  )-----------( 221      ( 17 Dec 2023 00:40 )             23.3     12-17    137  |  105  |  23  ----------------------------<  82  3.6   |  17<L>  |  1.82<H>    Ca    9.1      17 Dec 2023 00:40  Phos  3.6     12-17  Mg     2.20     12-17    TPro  6.1  /  Alb  3.1<L>  /  TBili  0.4  /  DBili  x   /  AST  65<H>  /  ALT  74<H>  /  AlkPhos  66  12-16    LIVER FUNCTIONS - ( 16 Dec 2023 01:20 )  Alb: 3.1 g/dL / Pro: 6.1 g/dL / ALK PHOS: 66 U/L / ALT: 74 U/L / AST: 65 U/L / GGT: x           PT/INR - ( 17 Dec 2023 00:40 )   PT: 10.7 sec;   INR: 0.95 ratio                 Urinalysis Basic - ( 17 Dec 2023 00:40 )    Color: x / Appearance: x / SG: x / pH: x  Gluc: 82 mg/dL / Ketone: x  / Bili: x / Urobili: x   Blood: x / Protein: x / Nitrite: x   Leuk Esterase: x / RBC: x / WBC x   Sq Epi: x / Non Sq Epi: x / Bacteria: x      POCT Blood Glucose.: 94 mg/dL (12-17-23 @ 07:25)  POCT Blood Glucose.: 96 mg/dL (12-17-23 @ 05:36)  POCT Blood Glucose.: 100 mg/dL *H* (12-16-23 @ 23:12)  POCT Blood Glucose.: 115 mg/dL *H* (12-16-23 @ 17:31)  POCT Blood Glucose.: 74 mg/dL (12-16-23 @ 17:15)  POCT Blood Glucose.: 66 mg/dL *L* (12-16-23 @ 17:11)  POCT Blood Glucose.: 125 mg/dL *H* (12-16-23 @ 11:02)        MEDICATIONS  (STANDING):  chlorhexidine 2% Cloths 1 Application(s) Topical two times a day  dextrose 5%. 1000 milliLiter(s) (50 mL/Hr) IV Continuous <Continuous>  dextrose 50% Injectable 25 Gram(s) IV Push once  glucagon  Injectable 1 milliGRAM(s) IntraMuscular once  heparin   Injectable 5000 Unit(s) SubCutaneous every 8 hours  influenza  Vaccine (HIGH DOSE) 0.7 milliLiter(s) IntraMuscular once  insulin glargine Injectable (LANTUS) 5 Unit(s) SubCutaneous <User Schedule>  insulin lispro (ADMELOG) corrective regimen sliding scale   SubCutaneous every 6 hours  norepinephrine Infusion 0.05 MICROgram(s)/kG/Min (5.81 mL/Hr) IV Continuous <Continuous>  petrolatum Ophthalmic Ointment 1 Application(s) Both EYES two times a day  piperacillin/tazobactam IVPB.. 3.375 Gram(s) IV Intermittent every 12 hours    MEDICATIONS  (PRN):  dextrose Oral Gel 15 Gram(s) Oral once PRN Blood Glucose LESS THAN 70 milliGRAM(s)/deciliter      Allergies:  No Known Allergies        CRITICAL CARE TIME SPENT:  minutes of critical care time spent providing medical care for patient's acute illness/conditions that impairs at least one vital organ system and/or poses a high risk of imminent or life threatening deterioration in the patient's condition. It includes time spent evaluating and treating the patient's acute illness as well as time spent reviewing labs, radiology, discussing goals of care with patient and/or patient's family, and discussing the case with a multidisciplinary team, in an effort to prevent further life threatening deterioration or end organ damage. This time is independent of any procedures performed.         Significant recent/past 24 hr events: T 99.6, 100.5 (12/16) and 102.2 (12/15)    Subjective: pt denies any complains     Review of Systems         [ ] A ten-point review of systems was otherwise negative except as noted.  [ ] Due to altered mental status/intubation, subjective information were not able to be obtained from the patient. History was obtained, to the extent possible, from review of the chart and collateral sources of information.      Patient is a 88y old  Male who presents with a chief complaint of transfer for cardiac arrest (16 Dec 2023 10:08)    HPI:  Mr. Campbell is a 89yo man w/ HTN and DM who was transferred from [facility] after cardiac arrest for further care,     Pt is from the , does not recieve medical care in the [place]. He is visiting his daughter. Per his daughter, this morning he had a event of LOC that lasted a few minutes where his daughter found him on the ground, unresponsive, w/ gaze deviation and rigid. She is unsure if there was any seizure, but denies any hx of seizures or neurological issues. He regained consciousness spontaneously and was able to recognize his daughter. He was brought to [facility] where he was found to be afebrile w/ normal BP, and tachycardic w/ AFib wih rates in the 140-150s. While at [place] pt denied CP, SOB, abd pain, c/d/n/v, LE swelling. While in the ED at [place] he had an acute episode of reported gaze deviation and unresponsiveness. During the episode he became hypoxic, He then lost a pulse and ACLS was started w/ ROSC after 8min with one round of epi. He was intubated and started on prop and levophed. CTH showed no bleed.     Labs there were notable for a leukocytosis, anemia, Cr 3.3, acidosis, HS-trop I 3300, w/ a ABG showed 7.16 / 31 / 429. CXR was relatively clear w/ no clear consolidation or signs of fluid overload. He was then transferred to [facility] for further cardiac eval. Cardiology here at [facility] had low suspicion for Cardiac etiology for Cardiac arrest , recommended MICU consult. EKG notable for PVCs , no ST elevation / no ST depression.       (13 Dec 2023 14:02)    PAST MEDICAL & SURGICAL HISTORY:  Hypertension      Diabetes      HLD (hyperlipidemia)      H/O appendicitis        FAMILY HISTORY:      Vitals   ICU Vital Signs Last 24 Hrs  T(C): 36.8 (17 Dec 2023 08:00), Max: 38.1 (16 Dec 2023 12:00)  T(F): 98.3 (17 Dec 2023 08:00), Max: 100.5 (16 Dec 2023 12:00)  HR: 102 (17 Dec 2023 07:00) (70 - 107)  BP: --  BP(mean): --  ABP: 131/55 (17 Dec 2023 07:00) (103/43 - 155/63)  ABP(mean): 88 (17 Dec 2023 07:00) (65 - 98)  RR: 21 (17 Dec 2023 07:00) (18 - 29)  SpO2: 100% (17 Dec 2023 07:00) (98% - 100%)    O2 Parameters below as of 17 Dec 2023 08:00  Patient On (Oxygen Delivery Method): room air            Physical Exam:   Constitutional: NAD, well-groomed, well-developed  HEENT: PERRLA, EOMI, no drainage or redness  Neck: supple,  No JVD, Trachea midline  Back: Normal spine flexure, No CVA tenderness, No deformity or limitation of movement  Respiratory: Breath Sounds equal & clear bilaterally to auscultation, no accessory muscle use noted  Cardiovascular: Regular rate, regular rhythm, normal S1, S2; no murmurs or rub  Gastrointestinal: Soft, non-tender, non distended, no hepatosplenomegaly, normal bowel sounds  Extremities: RG x 4, no peripheral edema, no cyanosis, no clubbing   Vascular: Equal and normal pulses: 2+ peripheral pulses throughout  Neurological: A+O x 3; speech clear and intact;   -- moving LUE, involuntary tremors __ upper extremities, no sensory, motor deficits, normal reflexes;  Psychiatric: calm, normal mood, normal affect  Musculoskeletal: No joint swelling or deformity; no limitation of movement  Skin: warm, dry, well perfused, no rashes    VENT SETTINGS     ABG - ( 17 Dec 2023 00:40 )  pH, Arterial: 7.42  pH, Blood: x     /  pCO2: 30    /  pO2: 111   / HCO3: 20    / Base Excess: -4.4  /  SaO2: 98.0                I&O's Detail    16 Dec 2023 07:01  -  17 Dec 2023 07:00  --------------------------------------------------------  IN:    Dexmedetomidine: 70.5 mL    Glucerna 1.5: 70 mL    IV PiggyBack: 550 mL    Norepinephrine: 11.7 mL  Total IN: 702.2 mL    OUT:    Indwelling Catheter - Urethral (mL): 1150 mL    Voided (mL): 500 mL  Total OUT: 1650 mL    Total NET: -947.8 mL          LABS                        7.7    6.17  )-----------( 221      ( 17 Dec 2023 00:40 )             23.3     12-17    137  |  105  |  23  ----------------------------<  82  3.6   |  17<L>  |  1.82<H>    Ca    9.1      17 Dec 2023 00:40  Phos  3.6     12-17  Mg     2.20     12-17    TPro  6.1  /  Alb  3.1<L>  /  TBili  0.4  /  DBili  x   /  AST  65<H>  /  ALT  74<H>  /  AlkPhos  66  12-16    LIVER FUNCTIONS - ( 16 Dec 2023 01:20 )  Alb: 3.1 g/dL / Pro: 6.1 g/dL / ALK PHOS: 66 U/L / ALT: 74 U/L / AST: 65 U/L / GGT: x           PT/INR - ( 17 Dec 2023 00:40 )   PT: 10.7 sec;   INR: 0.95 ratio                 Urinalysis Basic - ( 17 Dec 2023 00:40 )    Color: x / Appearance: x / SG: x / pH: x  Gluc: 82 mg/dL / Ketone: x  / Bili: x / Urobili: x   Blood: x / Protein: x / Nitrite: x   Leuk Esterase: x / RBC: x / WBC x   Sq Epi: x / Non Sq Epi: x / Bacteria: x      POCT Blood Glucose.: 94 mg/dL (12-17-23 @ 07:25)  POCT Blood Glucose.: 96 mg/dL (12-17-23 @ 05:36)  POCT Blood Glucose.: 100 mg/dL *H* (12-16-23 @ 23:12)  POCT Blood Glucose.: 115 mg/dL *H* (12-16-23 @ 17:31)  POCT Blood Glucose.: 74 mg/dL (12-16-23 @ 17:15)  POCT Blood Glucose.: 66 mg/dL *L* (12-16-23 @ 17:11)  POCT Blood Glucose.: 125 mg/dL *H* (12-16-23 @ 11:02)        MEDICATIONS  (STANDING):  chlorhexidine 2% Cloths 1 Application(s) Topical two times a day  dextrose 5%. 1000 milliLiter(s) (50 mL/Hr) IV Continuous <Continuous>  dextrose 50% Injectable 25 Gram(s) IV Push once  glucagon  Injectable 1 milliGRAM(s) IntraMuscular once  heparin   Injectable 5000 Unit(s) SubCutaneous every 8 hours  influenza  Vaccine (HIGH DOSE) 0.7 milliLiter(s) IntraMuscular once  insulin glargine Injectable (LANTUS) 5 Unit(s) SubCutaneous <User Schedule>  insulin lispro (ADMELOG) corrective regimen sliding scale   SubCutaneous every 6 hours  norepinephrine Infusion 0.05 MICROgram(s)/kG/Min (5.81 mL/Hr) IV Continuous <Continuous>  petrolatum Ophthalmic Ointment 1 Application(s) Both EYES two times a day  piperacillin/tazobactam IVPB.. 3.375 Gram(s) IV Intermittent every 12 hours    MEDICATIONS  (PRN):  dextrose Oral Gel 15 Gram(s) Oral once PRN Blood Glucose LESS THAN 70 milliGRAM(s)/deciliter      Allergies:  No Known Allergies                 Significant recent/past 24 hr events: T 99.6, 100.5 (12/16) and 102.2 (12/15)    Subjective: pt denies any complains     Review of Systems         [ ] A ten-point review of systems was otherwise negative except as noted.  [ ] Due to altered mental status/intubation, subjective information were not able to be obtained from the patient. History was obtained, to the extent possible, from review of the chart and collateral sources of information.      Patient is a 88y old  Male who presents with a chief complaint of transfer for cardiac arrest (16 Dec 2023 10:08)    HPI:  Mr. Campbell is a 87yo man w/ HTN and DM who was transferred from [facility] after cardiac arrest for further care,     Pt is from the , does not recieve medical care in the [place]. He is visiting his daughter. Per his daughter, this morning he had a event of LOC that lasted a few minutes where his daughter found him on the ground, unresponsive, w/ gaze deviation and rigid. She is unsure if there was any seizure, but denies any hx of seizures or neurological issues. He regained consciousness spontaneously and was able to recognize his daughter. He was brought to [facility] where he was found to be afebrile w/ normal BP, and tachycardic w/ AFib wih rates in the 140-150s. While at [place] pt denied CP, SOB, abd pain, c/d/n/v, LE swelling. While in the ED at [place] he had an acute episode of reported gaze deviation and unresponsiveness. During the episode he became hypoxic, He then lost a pulse and ACLS was started w/ ROSC after 8min with one round of epi. He was intubated and started on prop and levophed. CTH showed no bleed.     Labs there were notable for a leukocytosis, anemia, Cr 3.3, acidosis, HS-trop I 3300, w/ a ABG showed 7.16 / 31 / 429. CXR was relatively clear w/ no clear consolidation or signs of fluid overload. He was then transferred to [facility] for further cardiac eval. Cardiology here at [facility] had low suspicion for Cardiac etiology for Cardiac arrest , recommended MICU consult. EKG notable for PVCs , no ST elevation / no ST depression.       (13 Dec 2023 14:02)    PAST MEDICAL & SURGICAL HISTORY:  Hypertension      Diabetes      HLD (hyperlipidemia)      H/O appendicitis        FAMILY HISTORY:      Vitals   ICU Vital Signs Last 24 Hrs  T(C): 36.8 (17 Dec 2023 08:00), Max: 38.1 (16 Dec 2023 12:00)  T(F): 98.3 (17 Dec 2023 08:00), Max: 100.5 (16 Dec 2023 12:00)  HR: 102 (17 Dec 2023 07:00) (70 - 107)  BP: --  BP(mean): --  ABP: 131/55 (17 Dec 2023 07:00) (103/43 - 155/63)  ABP(mean): 88 (17 Dec 2023 07:00) (65 - 98)  RR: 21 (17 Dec 2023 07:00) (18 - 29)  SpO2: 100% (17 Dec 2023 07:00) (98% - 100%)    O2 Parameters below as of 17 Dec 2023 08:00  Patient On (Oxygen Delivery Method): room air            Physical Exam:   Constitutional: NAD, well-groomed, well-developed  HEENT: PERRLA, EOMI, no drainage or redness  Neck: supple,  No JVD, Trachea midline  Back: Normal spine flexure, No CVA tenderness, No deformity or limitation of movement  Respiratory: Breath Sounds equal & clear bilaterally to auscultation, no accessory muscle use noted  Cardiovascular: Regular rate, regular rhythm, normal S1, S2; no murmurs or rub  Gastrointestinal: Soft, non-tender, non distended, no hepatosplenomegaly, normal bowel sounds  Extremities: RG x 4, no peripheral edema, no cyanosis, no clubbing   Vascular: Equal and normal pulses: 2+ peripheral pulses throughout  Neurological: A+O x 3; speech clear and intact;   -- moving LUE, involuntary tremors __ upper extremities, no sensory, motor deficits, normal reflexes;  Psychiatric: calm, normal mood, normal affect  Musculoskeletal: No joint swelling or deformity; no limitation of movement  Skin: warm, dry, well perfused, no rashes    VENT SETTINGS     ABG - ( 17 Dec 2023 00:40 )  pH, Arterial: 7.42  pH, Blood: x     /  pCO2: 30    /  pO2: 111   / HCO3: 20    / Base Excess: -4.4  /  SaO2: 98.0                I&O's Detail    16 Dec 2023 07:01  -  17 Dec 2023 07:00  --------------------------------------------------------  IN:    Dexmedetomidine: 70.5 mL    Glucerna 1.5: 70 mL    IV PiggyBack: 550 mL    Norepinephrine: 11.7 mL  Total IN: 702.2 mL    OUT:    Indwelling Catheter - Urethral (mL): 1150 mL    Voided (mL): 500 mL  Total OUT: 1650 mL    Total NET: -947.8 mL          LABS                        7.7    6.17  )-----------( 221      ( 17 Dec 2023 00:40 )             23.3     12-17    137  |  105  |  23  ----------------------------<  82  3.6   |  17<L>  |  1.82<H>    Ca    9.1      17 Dec 2023 00:40  Phos  3.6     12-17  Mg     2.20     12-17    TPro  6.1  /  Alb  3.1<L>  /  TBili  0.4  /  DBili  x   /  AST  65<H>  /  ALT  74<H>  /  AlkPhos  66  12-16    LIVER FUNCTIONS - ( 16 Dec 2023 01:20 )  Alb: 3.1 g/dL / Pro: 6.1 g/dL / ALK PHOS: 66 U/L / ALT: 74 U/L / AST: 65 U/L / GGT: x           PT/INR - ( 17 Dec 2023 00:40 )   PT: 10.7 sec;   INR: 0.95 ratio                 Urinalysis Basic - ( 17 Dec 2023 00:40 )    Color: x / Appearance: x / SG: x / pH: x  Gluc: 82 mg/dL / Ketone: x  / Bili: x / Urobili: x   Blood: x / Protein: x / Nitrite: x   Leuk Esterase: x / RBC: x / WBC x   Sq Epi: x / Non Sq Epi: x / Bacteria: x      POCT Blood Glucose.: 94 mg/dL (12-17-23 @ 07:25)  POCT Blood Glucose.: 96 mg/dL (12-17-23 @ 05:36)  POCT Blood Glucose.: 100 mg/dL *H* (12-16-23 @ 23:12)  POCT Blood Glucose.: 115 mg/dL *H* (12-16-23 @ 17:31)  POCT Blood Glucose.: 74 mg/dL (12-16-23 @ 17:15)  POCT Blood Glucose.: 66 mg/dL *L* (12-16-23 @ 17:11)  POCT Blood Glucose.: 125 mg/dL *H* (12-16-23 @ 11:02)        MEDICATIONS  (STANDING):  chlorhexidine 2% Cloths 1 Application(s) Topical two times a day  dextrose 5%. 1000 milliLiter(s) (50 mL/Hr) IV Continuous <Continuous>  dextrose 50% Injectable 25 Gram(s) IV Push once  glucagon  Injectable 1 milliGRAM(s) IntraMuscular once  heparin   Injectable 5000 Unit(s) SubCutaneous every 8 hours  influenza  Vaccine (HIGH DOSE) 0.7 milliLiter(s) IntraMuscular once  insulin glargine Injectable (LANTUS) 5 Unit(s) SubCutaneous <User Schedule>  insulin lispro (ADMELOG) corrective regimen sliding scale   SubCutaneous every 6 hours  norepinephrine Infusion 0.05 MICROgram(s)/kG/Min (5.81 mL/Hr) IV Continuous <Continuous>  petrolatum Ophthalmic Ointment 1 Application(s) Both EYES two times a day  piperacillin/tazobactam IVPB.. 3.375 Gram(s) IV Intermittent every 12 hours    MEDICATIONS  (PRN):  dextrose Oral Gel 15 Gram(s) Oral once PRN Blood Glucose LESS THAN 70 milliGRAM(s)/deciliter      Allergies:  No Known Allergies

## 2023-12-17 NOTE — PROGRESS NOTE ADULT - ASSESSMENT
Mr. Campbell is a 89yo man w/ HTN and DM who presented to [facility] after episode of LOC found to be in AFib  c/b bradycardic/PEA arrest w/ ROSC after 8 minutes with 1 epi,  now transferred to [facility] for cardiac eval. Low suspicion for cardiac etiology for cardiac arrest. Patient transferred to MICU for higher level of care.       Plan:    =======Neuro=======   -s/p intubated/sedation- extubated 12/16   - off all sedation   - patient awake following commands moving all extremities   - EEG neg for seizures can D/C  - CTH w/o acute findings    =======Resp=======  # Asp Pnu  #Intubated s/p cardiac arrest 12/13 extubated 12/16 to face tent   - currently on face tent 40%- titrate off as tolerated   - empiric Zosyn     =======CV=======  #HTN- patient on losartan/hydrochlorothiazide at home   #cardiac arrest 12/13 (PEA ROSC after 8 mins- pt became bradycardic w/ intermittent PVCs, but did not have any evidence of VT or VFib per the strips)  # Severe AS   # Shock likely Vasoplegic sec to sedation  - TTE severe aortic stenosis, EF 56%  - Cardiac consult; TAVR/ Ischemic w/u once extubated  - on levophed @ 0.01 wean as tolerated   - LE doppler negative DVT  - pt not a candidate for midodrine d/t AS      =======GI=======  #No acute issue   # Mild Transaminitis post arrest  - no oral access now as patient extubated  - S/S eval vs NGT     =======Renal=======  #ALEXI 2/2 shock - improving   - U/S left lower renal lesion. Asymmetrically atrophic right kidney.   - BUN/Cr 24/2.37 (unclear baseline), cont to monitor bicarb   - Renally dose meds/Avoid nephrotoxin agents   - Cedillo placed, monitor I/O & electrolytes   - f/u LL renal pole lesion 6mos o/p    =======Endo=======  #T2DM   # Euglycemic DKA ( SGLT-2 at home)   - A1c 5.6  - Hold home SGLT-2   - s/p insulin gtt/ dextrose  - transitioned 12/16 with 10u lantus   - may need to add premeals 3u humalog once on diet/ tube feeds     =======ID=======  #asp pnu  #Recurrent fevers - 102.2 overnight  - Empiric Zosyn (12/13- ) 7d course  -F/U Blood culture 12/16, 12/13 neg   - UA neg/ RVP neg     =======Heme=======  - SQH    =======Ethics=======   -full code   Mr. Campbell is a 87yo man w/ HTN and DM who presented to [facility] after episode of LOC found to be in AFib  c/b bradycardic/PEA arrest w/ ROSC after 8 minutes with 1 epi,  now transferred to [facility] for cardiac eval. Low suspicion for cardiac etiology for cardiac arrest. Patient transferred to MICU for higher level of care.       Plan:    =======Neuro=======   -s/p intubated/sedation- extubated 12/16   - off all sedation   - patient awake following commands moving all extremities   - EEG neg for seizures can D/C  - CTH w/o acute findings    =======Resp=======  # Asp Pnu  #Intubated s/p cardiac arrest 12/13 extubated 12/16 to face tent   - currently on face tent 40%- titrate off as tolerated   - empiric Zosyn     =======CV=======  #HTN- patient on losartan/hydrochlorothiazide at home   #cardiac arrest 12/13 (PEA ROSC after 8 mins- pt became bradycardic w/ intermittent PVCs, but did not have any evidence of VT or VFib per the strips)  # Severe AS   # Shock likely Vasoplegic sec to sedation  - TTE severe aortic stenosis, EF 56%  - Cardiac consult; TAVR/ Ischemic w/u once extubated  - on levophed @ 0.01 wean as tolerated   - LE doppler negative DVT  - pt not a candidate for midodrine d/t AS      =======GI=======  #No acute issue   # Mild Transaminitis post arrest  - no oral access now as patient extubated  - S/S eval vs NGT     =======Renal=======  #ALEXI 2/2 shock - improving   - U/S left lower renal lesion. Asymmetrically atrophic right kidney.   - BUN/Cr 24/2.37 (unclear baseline), cont to monitor bicarb   - Renally dose meds/Avoid nephrotoxin agents   - Cedillo placed, monitor I/O & electrolytes   - f/u LL renal pole lesion 6mos o/p    =======Endo=======  #T2DM   # Euglycemic DKA ( SGLT-2 at home)   - A1c 5.6  - Hold home SGLT-2   - s/p insulin gtt/ dextrose  - transitioned 12/16 with 10u lantus   - may need to add premeals 3u humalog once on diet/ tube feeds     =======ID=======  #asp pnu  #Recurrent fevers - 102.2 overnight  - Empiric Zosyn (12/13- ) 7d course  -F/U Blood culture 12/16, 12/13 neg   - UA neg/ RVP neg     =======Heme=======  - SQH    =======Ethics=======   -full code   Mr. Campbell is a 87yo man w/ HTN and DM who presented to [facility] after episode of LOC found to be in AFib  c/b bradycardic/PEA arrest w/ ROSC after 8 minutes with 1 epi,  now transferred to [facility] for cardiac eval. Low suspicion for cardiac etiology for cardiac arrest. Patient transferred to MICU for higher level of care.       Plan:    =======Neuro=======   -s/p intubated/sedation- extubated 12/16   - off all sedation   - patient awake following commands moving all extremities   - EEG neg for seizures, discontinued 12/17   - CTH w/o acute findings    =======Resp=======  # Asp Pneumonia  #Intubated s/p cardiac arrest 12/13 extubated 12/16 to face tent   - on RA, no acute distress   - empiric Zosyn     =======CV=======  #HTN- patient on losartan/hydrochlorothiazide at home   #cardiac arrest 12/13 (PEA ROSC after 8 mins, 1 round of epi - pt became bradycardic w/ intermittent PVCs, but did not have any evidence of VT or VFib per the strips)  # Severe AS   # Shock likely Vasoplegic sec to sedation  - TTE severe aortic stenosis, EF 56%  - Cardiac consult; TAVR/ Ischemic w/u on Monday   - remains off pressors since MN  - LE doppler negative DVT  - pt not a candidate for midodrine d/t AS    =======GI=======  #No acute issue   # Mild Transaminitis post arrest, improving  -- pt is tolerating puree diet, __   -  SLP eval pending     =======Renal=======  #ALEXI 2/2 shock - improving   - U/S left lower renal lesion. Asymmetrically atrophic right kidney.   - BUN/Cr 24/2.37 (unclear baseline), trending down-- 23/1.82, cont to monitor bicarb __ 16>17   - Renally dose meds/Avoid nephrotoxin agents   - Cedillo placed, monitor I/O & electrolytes   - Outpatient f/u for LL renal pole lesion in 6mos     =======Endo=======  #T2DM   # Euglycemic DKA ( SGLT-2 at home)   - A1c 5.6  - Hold home SGLT-2, pt's daughter will bring in tomorrow the list of pt;s meds   -- s/p insulin gtt/ dextrose  - pt was transitioned to 5 U of Lantus and required reinitiation of Insulin gtt, then was transitioned 12/16 05:30 am with 10u Lantus, but with low FS 12/16 in the evening -- 60's, then pt received 5 U of Lantus today in am d/t FS 90's  -- called endocrine consultation-- as per recs __ give additional 2 U of Lantus and started on ADMELOG 2 U qac  - BHB went up from 0.5 to 1.6, AG still closed__ as per discussion with endocrine_ _repeat BMP and BHB in 2 hrs and continue monitoring FS, c/f that pt possibly will need insulin gtt again     =======ID=======  #asp PNA   #Recurrent fevers, improving, last night T max 99.6, on 12/16-- 100.5 and on 12/15-- 102.2   - Empiric Zosyn (12/13- 12/20 ) 7d course  -Blood culture 12/16, 12/13 neg   - UA neg/ RVP neg, off pressors      =======Heme=======  - SQH    =======Ethics=======   -full code    LINES: a line     ===== Dispo: ==== MICU, possibly will transfer to medicine if AG and BHB are stable    Mr. Campbell is a 87yo man w/ HTN and DM who presented to [facility] after episode of LOC found to be in AFib  c/b bradycardic/PEA arrest w/ ROSC after 8 minutes with 1 epi,  now transferred to [facility] for cardiac eval. Low suspicion for cardiac etiology for cardiac arrest. Patient transferred to MICU for higher level of care.       Plan:    =======Neuro=======   -s/p intubated/sedation- extubated 12/16   - off all sedation   - patient awake following commands moving all extremities   - EEG neg for seizures, discontinued 12/17   - CTH w/o acute findings    =======Resp=======  # Asp Pneumonia  #Intubated s/p cardiac arrest 12/13 extubated 12/16 to face tent   - on RA, no acute distress   - empiric Zosyn     =======CV=======  #HTN- patient on losartan/hydrochlorothiazide at home   #cardiac arrest 12/13 (PEA ROSC after 8 mins, 1 round of epi - pt became bradycardic w/ intermittent PVCs, but did not have any evidence of VT or VFib per the strips)  # Severe AS   # Shock likely Vasoplegic sec to sedation  - TTE severe aortic stenosis, EF 56%  - Cardiac consult; TAVR/ Ischemic w/u on Monday   - remains off pressors since MN  - LE doppler negative DVT  - pt not a candidate for midodrine d/t AS    =======GI=======  #No acute issue   # Mild Transaminitis post arrest, improving  -- pt is tolerating puree diet, __   -  SLP eval pending     =======Renal=======  #ALEXI 2/2 shock - improving   - U/S left lower renal lesion. Asymmetrically atrophic right kidney.   - BUN/Cr 24/2.37 (unclear baseline), trending down-- 23/1.82, cont to monitor bicarb __ 16>17   - Renally dose meds/Avoid nephrotoxin agents   - Cedillo placed, monitor I/O & electrolytes   - Outpatient f/u for LL renal pole lesion in 6mos     =======Endo=======  #T2DM   # Euglycemic DKA ( SGLT-2 at home)   - A1c 5.6  - Hold home SGLT-2, pt's daughter will bring in tomorrow the list of pt;s meds   -- s/p insulin gtt/ dextrose  - pt was transitioned to 5 U of Lantus and required reinitiation of Insulin gtt, then was transitioned 12/16 05:30 am with 10u Lantus, but with low FS 12/16 in the evening -- 60's, then pt received 5 U of Lantus today in am d/t FS 90's  -- called endocrine consultation-- as per recs __ give additional 2 U of Lantus and started on ADMELOG 2 U qac  - BHB went up from 0.5 to 1.6, AG still closed__ as per discussion with endocrine_ _repeat BMP and BHB in 2 hrs and continue monitoring FS, c/f that pt possibly will need insulin gtt again     =======ID=======  #asp PNA   #Recurrent fevers, improving, last night T max 99.6, on 12/16-- 100.5 and on 12/15-- 102.2   - Empiric Zosyn (12/13- 12/20 ) 7d course  -Blood culture 12/16, 12/13 neg   - UA neg/ RVP neg, off pressors      =======Heme=======  - SQH    =======Ethics=======   -full code    LINES: a line     ===== Dispo: ==== MICU, possibly will transfer to medicine if AG and BHB are stable

## 2023-12-17 NOTE — CONSULT NOTE ADULT - SUBJECTIVE AND OBJECTIVE BOX
HPI:  Mr. Campbell is a 87yo man w/ HTN and DM who was transferred from [facility] after cardiac arrest for further care,   [place] is from the , [place] not receive medical care in the [place]. He is visiting his daughter.   At [facility] found to have euglycemic DKA, treated with IV insulin gtt, transitioned off 12/16  Per chart, on SGLT2 at home    History from daughter.  Pt with T2DM for several years.  She is unsure of his medication, has a list at home, but not with her, will try to bring tomorrow.  He checks glucose at home, she is not sure of the values but states they are "in range".  He sees and opthalmologist in , reports no retinopathy.  Also no neuropathy or nephropathy.   He does not limit carbohydrate intake.      Also with hypertension and hyperlipidemia, treated with medication        PAST MEDICAL & SURGICAL HISTORY:  Hypertension      Diabetes      HLD (hyperlipidemia)      H/O appendicitis    Appendectomy      FAMILY HISTORY:no first degree relatives with t2dm      Social History: former smoker, second-hand smoke exposure ( wife).  Lives with wife in .  Visiting son and daughter in [place].  retired, worked in construction    Outpatient Medications:  Home Medications:  Per cardiology note:  Candesartan-HCTZ 32-25mg  Dapagliflozin 10mg  Fenofibrate 160mg      MEDICATIONS  (STANDING):  chlorhexidine 2% Cloths 1 Application(s) Topical two times a day  dextrose 5%. 1000 milliLiter(s) (50 mL/Hr) IV Continuous <Continuous>  dextrose 50% Injectable 25 Gram(s) IV Push once  glucagon  Injectable 1 milliGRAM(s) IntraMuscular once  heparin   Injectable 5000 Unit(s) SubCutaneous every 8 hours  influenza  Vaccine (HIGH DOSE) 0.7 milliLiter(s) IntraMuscular once  insulin glargine Injectable (LANTUS) 5 Unit(s) SubCutaneous <User Schedule>  insulin lispro (ADMELOG) corrective regimen sliding scale   SubCutaneous at bedtime  insulin lispro (ADMELOG) corrective regimen sliding scale   SubCutaneous three times a day before meals  insulin lispro Injectable (ADMELOG) 2 Unit(s) SubCutaneous three times a day before meals  petrolatum Ophthalmic Ointment 1 Application(s) Both EYES two times a day  piperacillin/tazobactam IVPB.. 3.375 Gram(s) IV Intermittent every 12 hours    MEDICATIONS  (PRN):  dextrose Oral Gel 15 Gram(s) Oral once PRN Blood Glucose LESS THAN 70 milliGRAM(s)/deciliter      Allergies    No Known Allergies    Intolerances      Review of Systems:  Constitutional: No fever  Eyes: No blurry vision  Neuro: No headache  HEENT: No throat pain  Cardiovascular: No chest pain  Respiratory: No SOB, no cough  GI: No abdominal pain; + Diarrhea  : No dysuria  Skin: no rash  Psych: no depression  Endocrine: as noted in HPI  Hem/lymph: no swelling      PHYSICAL EXAM:  VITALS: T(C): 37.4 (12-17-23 @ 12:00)  T(F): 99.4 (12-17-23 @ 12:00), Max: 99.6 (12-17-23 @ 06:00)  HR: 105 (12-17-23 @ 12:00) (72 - 107)  RR:  (20 - 29)  SpO2:  (98% - 100%)  Wt(kg): 62.7  GENERAL: Lying in bed in NAD,   EYES: No proptosis,  HEENT:  Atraumatic, Normocephalic,   THYROID: Normal size,   RESPIRATORY: Clear to auscultation bilaterally  CARDIOVASCULAR: Regular rhythm; no peripheral edema  GI: Soft, nontender, non distended, normal bowel sounds  SKIN: Dry, intact, No rashes or lesions  NEURO: extraocular movements intact, no tremor,   PSYCH: Alert, oriented  to person      POCT Blood Glucose.: 138 mg/dL (12-17-23 @ 11:39)  POCT Blood Glucose.: 94 mg/dL (12-17-23 @ 07:25)  POCT Blood Glucose.: 96 mg/dL (12-17-23 @ 05:36)  POCT Blood Glucose.: 100 mg/dL (12-16-23 @ 23:12)  POCT Blood Glucose.: 115 mg/dL (12-16-23 @ 17:31)  POCT Blood Glucose.: 74 mg/dL (12-16-23 @ 17:15)  POCT Blood Glucose.: 66 mg/dL (12-16-23 @ 17:11)  POCT Blood Glucose.: 125 mg/dL (12-16-23 @ 11:02)  POCT Blood Glucose.: 131 mg/dL (12-16-23 @ 07:06)  POCT Blood Glucose.: 177 mg/dL (12-16-23 @ 04:21)  POCT Blood Glucose.: 191 mg/dL (12-16-23 @ 03:34)  POCT Blood Glucose.: 197 mg/dL (12-16-23 @ 02:46)  POCT Blood Glucose.: 208 mg/dL (12-16-23 @ 01:24)  POCT Blood Glucose.: 211 mg/dL (12-16-23 @ 00:08)  POCT Blood Glucose.: 190 mg/dL (12-15-23 @ 23:07)  POCT Blood Glucose.: 201 mg/dL (12-15-23 @ 22:04)  POCT Blood Glucose.: 185 mg/dL (12-15-23 @ 21:02)  POCT Blood Glucose.: 142 mg/dL (12-15-23 @ 20:05)  POCT Blood Glucose.: 149 mg/dL (12-15-23 @ 19:07)  POCT Blood Glucose.: 147 mg/dL (12-15-23 @ 18:12)  POCT Blood Glucose.: 186 mg/dL (12-15-23 @ 17:00)  POCT Blood Glucose.: 208 mg/dL (12-15-23 @ 16:14)  POCT Blood Glucose.: 182 mg/dL (12-15-23 @ 15:05)  POCT Blood Glucose.: 206 mg/dL (12-15-23 @ 14:22)  POCT Blood Glucose.: 200 mg/dL (12-15-23 @ 13:00)  POCT Blood Glucose.: 200 mg/dL (12-15-23 @ 12:22)  POCT Blood Glucose.: 184 mg/dL (12-15-23 @ 11:19)  POCT Blood Glucose.: 170 mg/dL (12-15-23 @ 09:57)  POCT Blood Glucose.: 171 mg/dL (12-15-23 @ 09:04)  POCT Blood Glucose.: 205 mg/dL (12-15-23 @ 08:01)  POCT Blood Glucose.: 184 mg/dL (12-15-23 @ 06:43)  POCT Blood Glucose.: 154 mg/dL (12-15-23 @ 05:24)  POCT Blood Glucose.: 161 mg/dL (12-15-23 @ 04:31)  POCT Blood Glucose.: 151 mg/dL (12-15-23 @ 03:29)  POCT Blood Glucose.: 174 mg/dL (12-15-23 @ 02:32)  POCT Blood Glucose.: 179 mg/dL (12-15-23 @ 01:37)  POCT Blood Glucose.: 128 mg/dL (12-15-23 @ 00:27)  POCT Blood Glucose.: 130 mg/dL (12-14-23 @ 23:32)  POCT Blood Glucose.: 111 mg/dL (12-14-23 @ 22:32)  POCT Blood Glucose.: 124 mg/dL (12-14-23 @ 21:47)  POCT Blood Glucose.: 68 mg/dL (12-14-23 @ 21:44)  POCT Blood Glucose.: 106 mg/dL (12-14-23 @ 20:43)  POCT Blood Glucose.: 118 mg/dL (12-14-23 @ 19:30)  POCT Blood Glucose.: 121 mg/dL (12-14-23 @ 18:58)  POCT Blood Glucose.: 123 mg/dL (12-14-23 @ 17:57)  POCT Blood Glucose.: 105 mg/dL (12-14-23 @ 17:08)                            7.7    6.17  )-----------( 221      ( 17 Dec 2023 00:40 )             23.3       12-17    138  |  106  |  22  ----------------------------<  118<H>  4.1   |  18<L>  |  1.87<H>    eGFR: 35<L>    Ca    9.1      12-17  Mg     2.10     12-17  Phos  3.3     12-17    TPro  6.1  /  Alb  3.1<L>  /  TBili  0.4  /  DBili  x   /  AST  65<H>  /  ALT  74<H>  /  AlkPhos  66  12-16    Thyroid Function Tests:      A1C with Estimated Average Glucose Result: 5.7 % (12-15-23 @ 04:23)  A1C with Estimated Average Glucose Result: 5.6 % (12-14-23 @ 00:20)      12-14 Chol 123 Direct LDL -- LDL calculated 37 HDL 33<L> Trig 266<H>  Radiology:                  HPI:  Mr. Campbell is a 87yo man w/ HTN and DM who was transferred from [facility] after cardiac arrest for further care,   Pt is from the , [place] not receive medical care in the [place]. He is visiting his daughter.   At [facility] found to have euglycemic DKA, treated with IV insulin gtt, transitioned off 12/16  Per chart, on SGLT2 at home    History from daughter.  Pt with T2DM for several years.  She is unsure of his medication, has a list at home, but not with her, will try to bring tomorrow.  He checks glucose at home, she is not sure of the values but states they are "in range".  He sees and opthalmologist in , reports no retinopathy.  Also no neuropathy or nephropathy.   He does not limit carbohydrate intake.      Also with hypertension and hyperlipidemia, treated with medication        PAST MEDICAL & SURGICAL HISTORY:  Hypertension      Diabetes      HLD (hyperlipidemia)      H/O appendicitis    Appendectomy      FAMILY HISTORY:no first degree relatives with t2dm      Social History: former smoker, second-hand smoke exposure ( wife).  Lives with wife in .  Visiting son and daughter in [place].  retired, worked in construction    Outpatient Medications:  Home Medications:  Per cardiology note:  Candesartan-HCTZ 32-25mg  Dapagliflozin 10mg  Fenofibrate 160mg      MEDICATIONS  (STANDING):  chlorhexidine 2% Cloths 1 Application(s) Topical two times a day  dextrose 5%. 1000 milliLiter(s) (50 mL/Hr) IV Continuous <Continuous>  dextrose 50% Injectable 25 Gram(s) IV Push once  glucagon  Injectable 1 milliGRAM(s) IntraMuscular once  heparin   Injectable 5000 Unit(s) SubCutaneous every 8 hours  influenza  Vaccine (HIGH DOSE) 0.7 milliLiter(s) IntraMuscular once  insulin glargine Injectable (LANTUS) 5 Unit(s) SubCutaneous <User Schedule>  insulin lispro (ADMELOG) corrective regimen sliding scale   SubCutaneous at bedtime  insulin lispro (ADMELOG) corrective regimen sliding scale   SubCutaneous three times a day before meals  insulin lispro Injectable (ADMELOG) 2 Unit(s) SubCutaneous three times a day before meals  petrolatum Ophthalmic Ointment 1 Application(s) Both EYES two times a day  piperacillin/tazobactam IVPB.. 3.375 Gram(s) IV Intermittent every 12 hours    MEDICATIONS  (PRN):  dextrose Oral Gel 15 Gram(s) Oral once PRN Blood Glucose LESS THAN 70 milliGRAM(s)/deciliter      Allergies    No Known Allergies    Intolerances      Review of Systems:  Constitutional: No fever  Eyes: No blurry vision  Neuro: No headache  HEENT: No throat pain  Cardiovascular: No chest pain  Respiratory: No SOB, no cough  GI: No abdominal pain; + Diarrhea  : No dysuria  Skin: no rash  Psych: no depression  Endocrine: as noted in HPI  Hem/lymph: no swelling      PHYSICAL EXAM:  VITALS: T(C): 37.4 (12-17-23 @ 12:00)  T(F): 99.4 (12-17-23 @ 12:00), Max: 99.6 (12-17-23 @ 06:00)  HR: 105 (12-17-23 @ 12:00) (72 - 107)  RR:  (20 - 29)  SpO2:  (98% - 100%)  Wt(kg): 62.7  GENERAL: Lying in bed in NAD,   EYES: No proptosis,  HEENT:  Atraumatic, Normocephalic,   THYROID: Normal size,   RESPIRATORY: Clear to auscultation bilaterally  CARDIOVASCULAR: Regular rhythm; no peripheral edema  GI: Soft, nontender, non distended, normal bowel sounds  SKIN: Dry, intact, No rashes or lesions  NEURO: extraocular movements intact, no tremor,   PSYCH: Alert, oriented  to person      POCT Blood Glucose.: 138 mg/dL (12-17-23 @ 11:39)  POCT Blood Glucose.: 94 mg/dL (12-17-23 @ 07:25)  POCT Blood Glucose.: 96 mg/dL (12-17-23 @ 05:36)  POCT Blood Glucose.: 100 mg/dL (12-16-23 @ 23:12)  POCT Blood Glucose.: 115 mg/dL (12-16-23 @ 17:31)  POCT Blood Glucose.: 74 mg/dL (12-16-23 @ 17:15)  POCT Blood Glucose.: 66 mg/dL (12-16-23 @ 17:11)  POCT Blood Glucose.: 125 mg/dL (12-16-23 @ 11:02)  POCT Blood Glucose.: 131 mg/dL (12-16-23 @ 07:06)  POCT Blood Glucose.: 177 mg/dL (12-16-23 @ 04:21)  POCT Blood Glucose.: 191 mg/dL (12-16-23 @ 03:34)  POCT Blood Glucose.: 197 mg/dL (12-16-23 @ 02:46)  POCT Blood Glucose.: 208 mg/dL (12-16-23 @ 01:24)  POCT Blood Glucose.: 211 mg/dL (12-16-23 @ 00:08)  POCT Blood Glucose.: 190 mg/dL (12-15-23 @ 23:07)  POCT Blood Glucose.: 201 mg/dL (12-15-23 @ 22:04)  POCT Blood Glucose.: 185 mg/dL (12-15-23 @ 21:02)  POCT Blood Glucose.: 142 mg/dL (12-15-23 @ 20:05)  POCT Blood Glucose.: 149 mg/dL (12-15-23 @ 19:07)  POCT Blood Glucose.: 147 mg/dL (12-15-23 @ 18:12)  POCT Blood Glucose.: 186 mg/dL (12-15-23 @ 17:00)  POCT Blood Glucose.: 208 mg/dL (12-15-23 @ 16:14)  POCT Blood Glucose.: 182 mg/dL (12-15-23 @ 15:05)  POCT Blood Glucose.: 206 mg/dL (12-15-23 @ 14:22)  POCT Blood Glucose.: 200 mg/dL (12-15-23 @ 13:00)  POCT Blood Glucose.: 200 mg/dL (12-15-23 @ 12:22)  POCT Blood Glucose.: 184 mg/dL (12-15-23 @ 11:19)  POCT Blood Glucose.: 170 mg/dL (12-15-23 @ 09:57)  POCT Blood Glucose.: 171 mg/dL (12-15-23 @ 09:04)  POCT Blood Glucose.: 205 mg/dL (12-15-23 @ 08:01)  POCT Blood Glucose.: 184 mg/dL (12-15-23 @ 06:43)  POCT Blood Glucose.: 154 mg/dL (12-15-23 @ 05:24)  POCT Blood Glucose.: 161 mg/dL (12-15-23 @ 04:31)  POCT Blood Glucose.: 151 mg/dL (12-15-23 @ 03:29)  POCT Blood Glucose.: 174 mg/dL (12-15-23 @ 02:32)  POCT Blood Glucose.: 179 mg/dL (12-15-23 @ 01:37)  POCT Blood Glucose.: 128 mg/dL (12-15-23 @ 00:27)  POCT Blood Glucose.: 130 mg/dL (12-14-23 @ 23:32)  POCT Blood Glucose.: 111 mg/dL (12-14-23 @ 22:32)  POCT Blood Glucose.: 124 mg/dL (12-14-23 @ 21:47)  POCT Blood Glucose.: 68 mg/dL (12-14-23 @ 21:44)  POCT Blood Glucose.: 106 mg/dL (12-14-23 @ 20:43)  POCT Blood Glucose.: 118 mg/dL (12-14-23 @ 19:30)  POCT Blood Glucose.: 121 mg/dL (12-14-23 @ 18:58)  POCT Blood Glucose.: 123 mg/dL (12-14-23 @ 17:57)  POCT Blood Glucose.: 105 mg/dL (12-14-23 @ 17:08)                            7.7    6.17  )-----------( 221      ( 17 Dec 2023 00:40 )             23.3       12-17    138  |  106  |  22  ----------------------------<  118<H>  4.1   |  18<L>  |  1.87<H>    eGFR: 35<L>    Ca    9.1      12-17  Mg     2.10     12-17  Phos  3.3     12-17    TPro  6.1  /  Alb  3.1<L>  /  TBili  0.4  /  DBili  x   /  AST  65<H>  /  ALT  74<H>  /  AlkPhos  66  12-16    Thyroid Function Tests:      A1C with Estimated Average Glucose Result: 5.7 % (12-15-23 @ 04:23)  A1C with Estimated Average Glucose Result: 5.6 % (12-14-23 @ 00:20)      12-14 Chol 123 Direct LDL -- LDL calculated 37 HDL 33<L> Trig 266<H>  Radiology:

## 2023-12-18 NOTE — DISCHARGE NOTE FOR THE EXPIRED PATIENT - HOSPITAL COURSE
87yo man w/ HTN and DM who was transferred from [facility] after cardiac arrest on 23 for further care and management.   Pt is from the , [place] recieve medical care in the [place]. He is visiting his daughter. Per his daughter, this morning he had a event of LOC that lasted a few minutes where his daughter found him on the ground, unresponsive, w/ gaze deviation and rigid. She is unsure if there was any seizure, but denies any hx of seizures or neurological issues. He regained consciousness spontaneously and was able to recognize his daughter. He was brought to [facility] where he was found to be afebrile w/ normal BP, and tachycardic w/ AFib wih rates in the 140-150s. While at [place] pt denied CP, SOB, abd pain, c/d/n/v, LE swelling. While in the ED at [place] he had an acute episode of reported gaze deviation and unresponsiveness. During the episode he became hypoxic, He then lost a pulse and ACLS was started w/ ROSC after 8min with one round of epi. He was intubated and started on prop and levophed. CTH showed no bleed. Labs there were notable for a leukocytosis, anemia, Cr 3.3, acidosis, HS-trop I 3300, w/ a ABG showed 7.16 / 31 / 429. CXR was relatively clear w/ no clear consolidation or signs of fluid overload. He was then transferred to [facility] for further cardiac eval. Cardiology here at [facility] had low suspicion for Cardiac etiology for Cardiac arrest. He was evaluated by the cardiology service, but with concern for euglycemic DKA and elevate troponin likely type II MI with underlying CAD rather than acute plaque rupture. The troponins had peaked and were downtrending. Of note, the patient has been confused in recent days per report (including currently), and has been getting EEGs showing, Moderate nonspecific diffuse or multifocal cerebral dysfunction., No epileptiform discharges or seizures were recorded. This evening on  he became hypotensive and was found to have an elevated lactate. He continued to further decompensate with rising pressors followed by code blue , ROSC was achieved after 9 minutes, epi x3, calcium x1, bicarb x1. Initial rhythm asystole, subsequent rhythm with VFib, shocked x1. Pt started on amio gtt. Family was notified about events, agreed with no further escalation, DNR obtained with capped pressors. Patient  at 0654 with family present at bedside      87yo man w/ HTN and DM who was transferred from [facility] after cardiac arrest on 23 for further care and management.   Pt is from the , [place] not recieve medical care in the [place]. He is visiting his daughter. Per his daughter, this morning he had a event of LOC that lasted a few minutes where his daughter found him on the ground, unresponsive, w/ gaze deviation and rigid. She is unsure if there was any seizure, but denies any hx of seizures or neurological issues. He regained consciousness spontaneously and was able to recognize his daughter. He was brought to [facility] where he was found to be afebrile w/ normal BP, and tachycardic w/ AFib wih rates in the 140-150s. While at [place] pt denied CP, SOB, abd pain, c/d/n/v, LE swelling. While in the ED at [place] he had an acute episode of reported gaze deviation and unresponsiveness. During the episode he became hypoxic, He then lost a pulse and ACLS was started w/ ROSC after 8min with one round of epi. He was intubated and started on prop and levophed. CTH showed no bleed. Labs there were notable for a leukocytosis, anemia, Cr 3.3, acidosis, HS-trop I 3300, w/ a ABG showed 7.16 / 31 / 429. CXR was relatively clear w/ no clear consolidation or signs of fluid overload. He was then transferred to [facility] for further cardiac eval. Cardiology here at [facility] had low suspicion for Cardiac etiology for Cardiac arrest. He was evaluated by the cardiology service, but with concern for euglycemic DKA and elevate troponin likely type II MI with underlying CAD rather than acute plaque rupture. The troponins had peaked and were downtrending. Of note, the patient has been confused in recent days per report (including currently), and has been getting EEGs showing, Moderate nonspecific diffuse or multifocal cerebral dysfunction., No epileptiform discharges or seizures were recorded. This evening on  he became hypotensive and was found to have an elevated lactate. He continued to further decompensate with rising pressors followed by code blue , ROSC was achieved after 9 minutes, epi x3, calcium x1, bicarb x1. Initial rhythm asystole, subsequent rhythm with VFib, shocked x1. Pt started on amio gtt. Family was notified about events, agreed with no further escalation, DNR obtained with capped pressors. Patient  at 0654 with family present at bedside      89yo man w/ HTN and DM who was transferred from [facility] after cardiac arrest on 23 for further care and management.   Pt is from the , does not recieve medical care in the [place]. He is visiting his daughter. Per his daughter, this morning he had a event of LOC that lasted a few minutes where his daughter found him on the ground, unresponsive, w/ gaze deviation and rigid. She is unsure if there was any seizure, but denies any hx of seizures or neurological issues. He regained consciousness spontaneously and was able to recognize his daughter. He was brought to [facility] where he was found to be afebrile w/ normal BP, and tachycardic w/ AFib wih rates in the 140-150s. While at [place] pt denied CP, SOB, abd pain, c/d/n/v, LE swelling. While in the ED at [place] he had an acute episode of reported gaze deviation and unresponsiveness. During the episode he became hypoxic, He then lost a pulse and ACLS was started w/ ROSC after 8min with one round of epi. He was intubated and started on prop and levophed. CTH showed no bleed. Labs there were notable for a leukocytosis, anemia, Cr 3.3, acidosis, HS-trop I 3300, w/ a ABG showed 7.16 / 31 / 429. CXR was relatively clear w/ no clear consolidation or signs of fluid overload. He was then transferred to [facility] for further cardiac eval. Cardiology here at [facility] had low suspicion for Cardiac etiology for Cardiac arrest. He was evaluated by the cardiology service, but with concern for euglycemic DKA and elevate troponin likely type II MI with underlying CAD rather than acute plaque rupture. The troponins had peaked and were downtrending. Of note, the patient has been confused in recent days per report (including currently), and has been getting EEGs showing, Moderate nonspecific diffuse or multifocal cerebral dysfunction., No epileptiform discharges or seizures were recorded. This evening on  he became hypotensive and was found to have an elevated lactate. He continued to further decompensate with rising pressors followed by code blue , ROSC was achieved after 9 minutes, epi x3, calcium x1, bicarb x1. Initial rhythm asystole, subsequent rhythm with VFib, shocked x1. Pt started on amio gtt. Family was notified about events, agreed with no further escalation, DNR obtained with capped pressors. Patient  at 0654 with family present at bedside      87yo man w/ HTN and DM who was transferred from [facility] after cardiac arrest on 23 for further care and management.   Pt is from the , does not recieve medical care in the [place]. He is visiting his daughter. Per his daughter, this morning he had a event of LOC that lasted a few minutes where his daughter found him on the ground, unresponsive, w/ gaze deviation and rigid. She is unsure if there was any seizure, but denies any hx of seizures or neurological issues. He regained consciousness spontaneously and was able to recognize his daughter. He was brought to [facility] where he was found to be afebrile w/ normal BP, and tachycardic w/ AFib wih rates in the 140-150s. While at [place] pt denied CP, SOB, abd pain, c/d/n/v, LE swelling. While in the ED at [place] he had an acute episode of reported gaze deviation and unresponsiveness. During the episode he became hypoxic, He then lost a pulse and ACLS was started w/ ROSC after 8min with one round of epi. He was intubated and started on prop and levophed. CTH showed no bleed. Labs there were notable for a leukocytosis, anemia, Cr 3.3, acidosis, HS-trop I 3300, w/ a ABG showed 7.16 / 31 / 429. CXR was relatively clear w/ no clear consolidation or signs of fluid overload. He was then transferred to [facility] for further cardiac eval. Cardiology here at [facility] had low suspicion for Cardiac etiology for Cardiac arrest. He was evaluated by the cardiology service, but with concern for euglycemic DKA and elevate troponin likely type II MI with underlying CAD rather than acute plaque rupture. The troponins had peaked and were downtrending. Of note, the patient has been confused in recent days per report (including currently), and has been getting EEGs showing, Moderate nonspecific diffuse or multifocal cerebral dysfunction., No epileptiform discharges or seizures were recorded. The evening of   EKG w/ new LBBB, given newly decreased LV Systolic function on POCUS, new LBBB, chest pain, and acute hypotension CCU c/s called. Initial Troponin 747, Cardiology PA and Fellow evaluated Pt at bedside, appreciated new LV dysfunction and EKG changes he became hypotensive and was found to have an elevated lactate. He continued to further decompensate with rising pressors followed by code blue , ROSC was achieved after 9 minutes, epi x3, calcium x1, bicarb x1. Initial rhythm asystole, subsequent rhythm with VFib, shocked x1. Pt started on amio gtt. Family was notified about events, agreed with no further escalation, DNR obtained with capped pressors. Patient  at 0654 with family present at bedside      87yo man w/ HTN and DM who was transferred from [facility] after cardiac arrest on 23 for further care and management.   Pt is from the , [place] not recieve medical care in the [place]. He is visiting his daughter. Per his daughter, this morning he had a event of LOC that lasted a few minutes where his daughter found him on the ground, unresponsive, w/ gaze deviation and rigid. She is unsure if there was any seizure, but denies any hx of seizures or neurological issues. He regained consciousness spontaneously and was able to recognize his daughter. He was brought to [facility] where he was found to be afebrile w/ normal BP, and tachycardic w/ AFib wih rates in the 140-150s. While at [place] pt denied CP, SOB, abd pain, c/d/n/v, LE swelling. While in the ED at [place] he had an acute episode of reported gaze deviation and unresponsiveness. During the episode he became hypoxic, He then lost a pulse and ACLS was started w/ ROSC after 8min with one round of epi. He was intubated and started on prop and levophed. CTH showed no bleed. Labs there were notable for a leukocytosis, anemia, Cr 3.3, acidosis, HS-trop I 3300, w/ a ABG showed 7.16 / 31 / 429. CXR was relatively clear w/ no clear consolidation or signs of fluid overload. He was then transferred to [facility] for further cardiac eval. Cardiology here at [facility] had low suspicion for Cardiac etiology for Cardiac arrest. He was evaluated by the cardiology service, but with concern for euglycemic DKA and elevate troponin likely type II MI with underlying CAD rather than acute plaque rupture. The troponins had peaked and were downtrending. Of note, the patient has been confused in recent days per report (including currently), and has been getting EEGs showing, Moderate nonspecific diffuse or multifocal cerebral dysfunction., No epileptiform discharges or seizures were recorded. The evening of   EKG w/ new LBBB, given newly decreased LV Systolic function on POCUS, new LBBB, chest pain, and acute hypotension CCU c/s called. Initial Troponin 747, Cardiology PA and Fellow evaluated Pt at bedside, appreciated new LV dysfunction and EKG changes he became hypotensive and was found to have an elevated lactate. He continued to further decompensate with rising pressors followed by code blue , ROSC was achieved after 9 minutes, epi x3, calcium x1, bicarb x1. Initial rhythm asystole, subsequent rhythm with VFib, shocked x1. Pt started on amio gtt. Family was notified about events, agreed with no further escalation, DNR obtained with capped pressors. Patient  at 0654 with family present at bedside      89yo man w/ HTN and DM who was transferred from [facility] after cardiac arrest on 23 for further care and management.   Pt is from the , does not recieve medical care in the [place]. He is visiting his daughter. Per his daughter, this morning he had a event of LOC that lasted a few minutes where his daughter found him on the ground, unresponsive, w/ gaze deviation and rigid. She is unsure if there was any seizure, but denies any hx of seizures or neurological issues. He regained consciousness spontaneously and was able to recognize his daughter. He was brought to [facility] where he was found to be afebrile w/ normal BP, and tachycardic w/ AFib wih rates in the 140-150s. While at [place] pt denied CP, SOB, abd pain, c/d/n/v, LE swelling. While in the ED at [place] he had an acute episode of reported gaze deviation and unresponsiveness. During the episode he became hypoxic, He then lost a pulse and ACLS was started w/ ROSC after 8min with one round of epi. He was intubated and started on prop and levophed. CTH showed no bleed. Labs there were notable for a leukocytosis, anemia, Cr 3.3, acidosis, HS-trop I 3300, w/ a ABG showed 7.16 / 31 / 429. CXR was relatively clear w/ no clear consolidation or signs of fluid overload. He was then transferred to [facility] for further cardiac eval. Cardiology here at [facility] had low suspicion for Cardiac etiology for Cardiac arrest. He was evaluated by the cardiology service, but with concern for euglycemic DKA and elevate troponin likely type II MI with underlying CAD rather than acute plaque rupture. The troponins had peaked and were downtrending. Of note, the patient has been confused in recent days per report (including currently), and has been getting EEGs showing, Moderate nonspecific diffuse or multifocal cerebral dysfunction., No epileptiform discharges or seizures were recorded. The evening of   EKG w/ new LBBB, given newly decreased LV Systolic function on POCUS, new LBBB, chest pain, and acute hypotension CCU c/s called. Initial Troponin 747, Cardiology PA and Fellow evaluated Pt at bedside, appreciated new LV dysfunction and EKG changes he became hypotensive and was found to have an elevated lactate. He continued to further decompensate with rising pressors followed by code blue , ROSC was achieved after 9 minutes, epi x3, calcium x1, bicarb x1. Initial rhythm asystole, subsequent rhythm with VFib, shocked x1. Pt started on amio gtt. Family was notified about events, agreed with no further escalation, DNR obtained with capped pressors. Patient  at 0654 with family present at bedside

## 2023-12-18 NOTE — PROCEDURE NOTE - NSSITEPREP_SKIN_A_CORE
chlorhexidine/Adherence to aseptic technique: hand hygiene prior to donning barriers (gown, gloves), don cap and mask, sterile drape over patient
chlorhexidine/Adherence to aseptic technique: hand hygiene prior to donning barriers (gown, gloves), don cap and mask, sterile drape over patient
None

## 2023-12-18 NOTE — CHART NOTE - NSCHARTNOTEFT_GEN_A_CORE
I was called to evaluate a patient in shock, on vasopressors in the MICU with concern for cardiogenic shock.  89 YO M with MHx of HTN and DM who presented to [facility] after episode of LOC found to be in AFib  c/b bradycardic/PEA arrest w/ ROSC after 8 minutes with 1 epi,  now transferred to [facility] for cardiac eval. He was evaluated by the cardiology service, with concern euglycemic DKA and elevate troponin likely type II MI with underlying CAD rather than acute plaque rupture. THe troponins have peaked and are downtrending. Of note,the patient has been confused in recent days per report (including currently), and has been getting EEGs showing, Moderate nonspecific diffuse or multifocal cerebral dysfunction., No epileptiform discharges or seizures were recorded. This evening he became hypotensive and was found to have an elevated lactate. I examined the patient bedside, and he does not appear to be in any distress. He  is in protective restraint mittens, confused. He is warm to the touch, and his lungs are clear. I did not notice any significant murmurs on exam. His MAP was 68 on Levo and yessi, his ,and his sat is 99% on RA. His EKG is similar to prior EKG. Repeat troponins continue to downtrend. In summary, it does not appear that this patient is in cardiogenic shock at this time. Can trend the cardiac enzymes, and if continue to uptrend can consider medical management for ACS, but at present time,i would investigate and address other causes of his shock state. Can repeat TTE in the AM.  Of  note, CCU  team discussed case with interventionalist, Dr. Terrazas, and he also thought this was not consistent with ACS at this time.  Please feel free to reach out with any additional questions.    Wilfredo Dennis, Cardiology Fellow, F-2    For all New Consults and Questions:  [email or web address]   Login: [email or web address] I was called to evaluate a patient in shock, on vasopressors in the MICU with concern for cardiogenic shock.  89 YO M with MHx of HTN and DM who presented to [facility] after episode of LOC found to be in AFib  c/b bradycardic/PEA arrest w/ ROSC after 8 minutes with 1 epi,  now transferred to [facility] for cardiac eval. He was evaluated by the cardiology service, with concern euglycemic DKA and elevate troponin likely type II MI with underlying CAD rather than acute plaque rupture. THe troponins have peaked and are downtrending. Of note,the patient has been confused in recent days per report (including currently), and has been getting EEGs showing, Moderate nonspecific diffuse or multifocal cerebral dysfunction., No epileptiform discharges or seizures were recorded. This evening he became hypotensive and was found to have an elevated lactate. I examined the patient bedside, and he does not appear to be in any distress. He  is in protective restraint mittens, confused. He is warm to the touch, and his lungs are clear. I did not notice any significant murmurs on exam. His MAP was 68 on Levo and yessi, his ,and his sat is 99% on RA. His EKG is similar to prior EKG. Repeat troponins continue to downtrend. In summary, it does not appear that this patient is in cardiogenic shock at this time. Can trend the cardiac enzymes, and if continue to uptrend can consider medical management for ACS, but at present time,i would investigate and address other causes of his shock state. Can repeat TTE in the AM.  Of  note, CCU  team discussed case with interventionalist, Dr. Terrazas, and he also thought this was not consistent with ACS at this time.  Please feel free to reach out with any additional questions.    Wilfredo Dennis, Cardiology Fellow, F-2    For all New Consults and Questions:  [email or web address]   Login: [email or web address] I was called to evaluate a patient in shock, on vasopressors in the MICU with concern for cardiogenic shock.  87 YO M with MHx of HTN and DM who presented to [facility] after episode of LOC found to be in AFib  c/b bradycardic/PEA arrest w/ ROSC after 8 minutes with 1 epi,  now transferred to [facility] for cardiac eval. He was evaluated by the cardiology service, with concern euglycemic DKA and elevate troponin likely type II MI with underlying CAD rather than acute plaque rupture. The troponins have peaked and are downtrending. Of note, the patient has been confused in recent days per report (including currently), and has been getting EEGs showing, Moderate nonspecific diffuse or multifocal cerebral dysfunction., No epileptiform discharges or seizures were recorded. This evening he became hypotensive and was found to have an elevated lactate. I examined the patient bedside, and he does not appear to be in any distress. He  is in protective restraint mittens, confused. He is warm to the touch, and his lungs are clear. I did not notice any significant murmurs on exam. His MAP was 68 on Levo and yessi, his ,and his sat is 99% on RA. His EKG is similar to prior EKG. Repeat troponins continue to downtrend. In summary, it does not appear that this patient is in cardiogenic shock at this time. Additionally, patient  not coherent or cooperative enough for procedure such as a swan Ayanna catheter. Can trend the cardiac enzymes, and if continue to uptrend can consider medical management for ACS, but at present time, I would investigate and address other causes of his shock state. Can repeat TTE in the AM.  Of  note, CCU  team discussed case with interventionalist, Dr. Terrazas, and he also thought this was not consistent with ACS at this time.  Please feel free to reach out with any additional questions.    Wilfredo Dennis, Cardiology Fellow, F-2    For all New Consults and Questions:  [email or web address]   Login: [email or web address] Incmarissa I was called to evaluate a patient in shock, on vasopressors in the MICU with concern for cardiogenic shock.  89 YO M with MHx of HTN and DM who presented to [facility] after episode of LOC found to be in AFib  c/b bradycardic/PEA arrest w/ ROSC after 8 minutes with 1 epi,  now transferred to [facility] for cardiac eval. He was evaluated by the cardiology service, with concern euglycemic DKA and elevate troponin likely type II MI with underlying CAD rather than acute plaque rupture. The troponins have peaked and are downtrending. Of note, the patient has been confused in recent days per report (including currently), and has been getting EEGs showing, Moderate nonspecific diffuse or multifocal cerebral dysfunction., No epileptiform discharges or seizures were recorded. This evening he became hypotensive and was found to have an elevated lactate. I examined the patient bedside, and he does not appear to be in any distress. He  is in protective restraint mittens, confused. He is warm to the touch, and his lungs are clear. I did not notice any significant murmurs on exam. His MAP was 68 on Levo and yessi, his ,and his sat is 99% on RA. His EKG is similar to prior EKG. Repeat troponins continue to downtrend. In summary, it does not appear that this patient is in cardiogenic shock at this time. Additionally, patient  not coherent or cooperative enough for procedure such as a swan Ayanna catheter. Can trend the cardiac enzymes, and if continue to uptrend can consider medical management for ACS, but at present time, I would investigate and address other causes of his shock state. Can repeat TTE in the AM.  Of  note, CCU  team discussed case with interventionalist, Dr. Terrazas, and he also thought this was not consistent with ACS at this time.  Please feel free to reach out with any additional questions.    Wilfredo Dennis, Cardiology Fellow, F-2    For all New Consults and Questions:  [email or web address]   Login: [email or web address] I was called to evaluate a patient in shock, on vasopressors in the MICU with concern for cardiogenic shock.  89 YO M with MHx of HTN and DM who presented to [facility] after episode of LOC found to be in AFib  c/b bradycardic/PEA arrest w/ ROSC after 8 minutes with 1 epi,  now transferred to [facility] for cardiac eval. He was evaluated by the cardiology service, with concern euglycemic DKA and elevate troponin likely type II MI with underlying CAD rather than acute plaque rupture. The troponins have peaked and are downtrending. Of note, the patient has been confused in recent days per report (including currently), and has been getting EEGs showing, Moderate nonspecific diffuse or multifocal cerebral dysfunction., No epileptiform discharges or seizures were recorded. This evening he became hypotensive and was found to have an elevated lactate. I examined the patient bedside, and he does not appear to be in any distress. He  is in protective restraint mittens, confused. He is warm to the touch, and his lungs are clear. I did not notice any significant murmurs on exam. His MAP was 68 on Levo and yessi, his ,and his sat is 99% on RA. His EKG is similar to prior EKG in terms of st segments, but with wider qrs, more consistent with lbbb.. Repeat troponins continue to downtrend. In summary, it does not appear that this patient is in cardiogenic shock at this time. Additionally, patient  not coherent or cooperative enough for procedure such as a swan Ayanna catheter. Can trend the cardiac enzymes, and if continue to uptrend can consider medical management for ACS (although an uptrend can still be type ii from demand of the hypotension and shock state), but at present time, I would investigate and address other causes of his shock state. Can repeat TTE in the AM.  Of  note, CCU  team discussed case with interventionalist, Dr. Terrazas, and he also thought this was not consistent with ACS at this time.  Please feel free to reach out with any additional questions.    Wilfredo Dennis, Cardiology Fellow, F-2    For all New Consults and Questions:  [email or web address]   Login: [employer] I was called to evaluate a patient in shock, on vasopressors in the MICU with concern for cardiogenic shock.  89 YO M with MHx of HTN and DM who presented to [facility] after episode of LOC found to be in AFib  c/b bradycardic/PEA arrest w/ ROSC after 8 minutes with 1 epi,  now transferred to [facility] for cardiac eval. He was evaluated by the cardiology service, with concern euglycemic DKA and elevate troponin likely type II MI with underlying CAD rather than acute plaque rupture. The troponins have peaked and are downtrending. Of note, the patient has been confused in recent days per report (including currently), and has been getting EEGs showing, Moderate nonspecific diffuse or multifocal cerebral dysfunction., No epileptiform discharges or seizures were recorded. This evening he became hypotensive and was found to have an elevated lactate. I examined the patient bedside, and he does not appear to be in any distress. He  is in protective restraint mittens, confused. He is warm to the touch, and his lungs are clear. I did not notice any significant murmurs on exam. His MAP was 68 on Levo and yessi, his ,and his sat is 99% on RA. His EKG is similar to prior EKG in terms of st segments, but with wider qrs, more consistent with lbbb.. Repeat troponins continue to downtrend. In summary, it does not appear that this patient is in cardiogenic shock at this time. Additionally, patient  not coherent or cooperative enough for procedure such as a swan Ayanna catheter. Can trend the cardiac enzymes, and if continue to uptrend can consider medical management for ACS (although an uptrend can still be type ii from demand of the hypotension and shock state), but at present time, I would investigate and address other causes of his shock state. Can repeat TTE in the AM.  Of  note, CCU  team discussed case with interventionalist, Dr. Terrazas, and he also thought this was not consistent with ACS at this time.  Please feel free to reach out with any additional questions.    Wilfredo Dennis, Cardiology Fellow, F-2    For all New Consults and Questions:  [email or web address]   Login: [email or web address]

## 2023-12-18 NOTE — CONSULT NOTE ADULT - ASSESSMENT
HPI: Mr. Campbell is a 87yo man w/ HTN and DM who presented to [facility] after episode of LOC found to be in AFib w/ RVR w/ anterolateral STDs c/b another bradycardic/PEA arrest w/ ROSC now transferred to [facility] found to have elevated trop, ALEXI, leukocytosis w/ EKG now showing sinus tach w/o ischemic changes, w/ AG metabolic acidosis w/ elevated BHB c/f euglycemic DKA admitted to the MICU for further care. Cardiology is consulted iso STDs seen during AFib w/ RVR and troponin elevation. PT was evaluated by cardiology and was treated conservatively for troponemia likely in setting of metabolic acidosis and plan for ischemic evaluation once more stable.     Interval events.   CCU consulted ?STEMI.  Earlier today pt became hypotensive and not responding to fluids and increased pressor requirements. EKG showing NSR, new LBB and ST depressions in leads II,III, aVF and V5, V6 and POCUS suggestive of new LV dysfunction, CCU consulted for ?STEMI. In MICU, pt is afebrile, diaphoretic, c/o chest pain and appears confused which has been his baseline for past few days.     Assessment and Plan:  # Abrnormal EKG HPI: Mr. Campbell is a 87yo man w/ HTN and DM who presented to [facility] after episode of LOC found to be in AFib w/ RVR w/ anterolateral STDs c/b another bradycardic/PEA arrest w/ ROSC now transferred to [facility] found to have elevated trop, ALEXI, leukocytosis w/ EKG now showing sinus tach w/o ischemic changes, w/ AG metabolic acidosis w/ elevated BHB c/f euglycemic DKA admitted to the MICU for further care. Cardiology is consulted iso STDs seen during AFib w/ RVR and troponin elevation. PT was evaluated by cardiology and was treated conservatively for troponemia likely in setting of metabolic acidosis and plan for ischemic evaluation once more stable.     Interval events.   CCU consulted ?STEMI.  Earlier today pt became hypotensive and not responding to fluids and increased pressor requirements. EKG showing NSR, new LBB and ST depressions in leads II,III, aVF and V5, V6 and POCUS suggestive of new LV dysfunction, CCU consulted for ?STEMI. In MICU, pt is afebrile, diaphoretic, c/o chest pain and appears confused which has been his baseline for past few days.     Assessment and Plan:  # Abrnormal EKG HPI: Mr. Campbell is a 87yo man w/ HTN and DM who [facility] found to be in AFib w/ RVR w/ anterolateral STDs c/b another bradycardic/PEA arrest w/ ROSC now transferred to [facility] found to have elevated trop, ALEXI, leukocytosis w/ EKG now showing sinus tach w/o ischemic changes, w/ AG metabolic acidosis w/ elevated BHB c/f euglycemic DKA admitted to the MICU for further care. Cardiology is consulted iso STDs seen during AFib w/ RVR and troponin elevation. PT was evaluated by cardiology and was treated conservatively for troponemia likely in setting of metabolic acidosis and plan for ischemic evaluation once more stable.     Interval events.   CCU consulted ?STEMI.  Earlier today pt became hypotensive and not responding to fluids and increased pressor requirements. EKG showing NSR, new LBB and ST depressions in leads II,III, aVF and V5, V6 and POCUS suggestive of new LV dysfunction, CCU consulted for ?STEMI. In MICU, pt is afebrile, diaphoretic, c/o chest pain and appears confused which has been his baseline for past few days.     Assessment and Plan:  # ?STEMI/Cardiogenic shock  Pt  appears uncomfortable, in minimal distress. On Room air, hypotensive with increasing pressor requirements w Levo and Yordan. EKG showing NSR, new LBBB and old ST depressions in leads  leads II,III, aVF and V5, V6. POCUS suggestive of new LV dysfunction. Warm extremities, no JVD, No pedal edema, No high grade cardiac murmur on exam, minimal rales. currently low suspicion for cardiogenic shock. lactate likely from underlying metabolic pathology.     Trop 747 -->  CK  315-->  CKMB  5.8-->  pBNP -->  lact 7.5    Case discussed w Interventionalist Dr Memo santillan, and Pt does not meet criteria for STEMI and does not require emergent LHC at this time.   - Pt currently does not require CCU level of care  - Repeat cardiac enzmyes and trend to peak  - can consider to treat medically for ACS if cardiac enzymes continues to trend up.  - Continue tele monitoring  - TTE in AM  - Replete lytes to keep K>4, Mg>2    Case discussed w fellow Dr Howe  Cardiology will continue to follow   HPI: Mr. Campbell is a 89yo man w/ HTN and DM who [facility] found to be in AFib w/ RVR w/ anterolateral STDs c/b another bradycardic/PEA arrest w/ ROSC now transferred to [facility] found to have elevated trop, ALEXI, leukocytosis w/ EKG now showing sinus tach w/o ischemic changes, w/ AG metabolic acidosis w/ elevated BHB c/f euglycemic DKA admitted to the MICU for further care. Cardiology is consulted iso STDs seen during AFib w/ RVR and troponin elevation. PT was evaluated by cardiology and was treated conservatively for troponemia likely in setting of metabolic acidosis and plan for ischemic evaluation once more stable.     Interval events.   CCU consulted ?STEMI.  Earlier today pt became hypotensive and not responding to fluids and increased pressor requirements. EKG showing NSR, new LBB and ST depressions in leads II,III, aVF and V5, V6 and POCUS suggestive of new LV dysfunction, CCU consulted for ?STEMI. In MICU, pt is afebrile, diaphoretic, c/o chest pain and appears confused which has been his baseline for past few days.     Assessment and Plan:  # ?STEMI/Cardiogenic shock  Pt  appears uncomfortable, in minimal distress. On Room air, hypotensive with increasing pressor requirements w Levo and Yordan. EKG showing NSR, new LBBB and old ST depressions in leads  leads II,III, aVF and V5, V6. POCUS suggestive of new LV dysfunction. Warm extremities, no JVD, No pedal edema, No high grade cardiac murmur on exam, minimal rales. currently low suspicion for cardiogenic shock. lactate likely from underlying metabolic pathology.     Trop 747 -->  CK  315-->  CKMB  5.8-->  pBNP -->  lact 7.5    Case discussed w Interventionalist Dr Memo santillan, and Pt does not meet criteria for STEMI and does not require emergent LHC at this time.   - Pt currently does not require CCU level of care  - Repeat cardiac enzmyes and trend to peak  - can consider to treat medically for ACS if cardiac enzymes continues to trend up.  - Continue tele monitoring  - TTE in AM  - Replete lytes to keep K>4, Mg>2    Case discussed w fellow Dr Howe  Cardiology will continue to follow

## 2023-12-18 NOTE — EEG REPORT - NS EEG TEXT BOX
[facility]: [address], [place] [address], Phone: [phone]  [facility]: [address], [place] [address], Phone: [phone]  [facility]: [address] [place] [address], Phone: [phone]    Patient Name: KEE LOUIS    Age: 88 year, : 1935  MRN #: -, Jaeger: -[id number]  Referring Physician: -  EEG #: [id number]    Study Date: 2023   Start Time: 8:00:17 AM      End Date:   2023       End Time: 14:00:06     Study Duration: 6 HR    Study Information:    EEG Recording Technique:  The patient underwent continuous Video-EEG monitoring, using Telemetry System hardware on the XLTek Digital System. EEG and video data were stored on a computer hard drive with important events saved in digital archive files. The material was reviewed by a physician (electroencephalographer / epileptologist) on a daily basis. Jose and seizure detection algorithms were utilized and reviewed. An EEG Technician attended to the patient, and was available throughout daytime work hours.  The epilepsy center neurologist was available in person or on call 24-hours per day.    EEG Placement and Labeling of Electrodes:  The EEG was performed utilizing 20 channel referential EEG connections (coronal over temporal over parasagittal montage) using all standard 10-20 electrode placements with EKG, with additional electrodes placed in the inferior temporal region using the modified 10-10 montage electrode placements for elective admissions, or if deemed necessary. Recording was at a sampling rate of 256 samples per second per channel. Time synchronized digital video recording was done simultaneously with EEG recording. A low light infrared camera was used for low light recording.     History:  -Patient is a 88y old  Male who presents with a chief complaint of transfer for cardiac arrest (14 Dec 2023 08:03)    Medication  No Data.    Interpretation:    [[[Abbreviation Key:  PDR=alpha rhythm/posterior dominant rhythm. A-P=anterior posterior.  Amplitude: ‘very low’:<20; ‘low’:20-49; ‘medium’:; ‘high’:>150uV.  Persistence for periodic/rhythmic patterns (% of epoch) ‘rare’:<1%; ‘occasional’:1-10%; ‘frequent’:10-50%; ‘abundant’:50-90%; ‘continuous’:>90%.  Persistence for sporadic discharges: ‘rare’:<1/hr; ‘occasional’:1/min-1/hr; ‘frequent’:>1/min; ‘abundant’:>1/10 sec.  RPP=rhythmic and periodic patterns; GRDA=generalized rhythmic delta activity; FIRDA=frontal intermittent GRDA; LRDA=lateralized rhythmic delta activity; TIRDA=temporal intermittent rhythmic delta activity;  LPD=PLED=lateralized periodic discharges; GPD=generalized periodic discharges; BIPDs =bilateral independent periodic discharges; Mf=multifocal; SIRPDs=stimulus induced rhythmic, periodic, or ictal appearing discharges; BIRDs=brief potentially ictal rhythmic discharges >4 Hz, lasting .5-10s; PFA (paroxysmal bursts >13 Hz or =8 Hz <10s).  Modifiers: +F=with fast component; +S=with spike component; +R=with rhythmic component.  S-B=burst suppression pattern.  Max=maximal. N1-drowsy; N2-stage II sleep; N3-slow wave sleep. SSS/BETS=small sharp spikes/benign epileptiform transients of sleep. HV=hyperventilation; PS=photic stimulation]]]      Daily EEG Visual Analysis    FINDINGS:      Background:  Symmetry: symmetric  Continuous: continuous  PDR: symmetric, well-modulated 6.5-7 Hz activity, with amplitude to 40 uV, that attenuated to eye opening.  Low amplitude frontal beta noted in wakefulness.  Reactivity: present  Voltage: normal, [defined typically between 20-150uV]  Anterior Posterior Gradient: present  Breach: absent    Background Slowing:  Generalized slowing: There was continuous generalized polymorphic theta>delta activity, at times rhythmic at 4-5 Hz, lasting 2-5 sec.  Focal slowing: none was present.    State Changes:   -Drowsiness was characterized by fragmentation, attenuation, and slowing of the background activity.    -N2 sleep transients with symmetric spindles and K-complexes.    Sporadic Epileptiform Discharges:   None    Rhythmic and Periodic Patterns (RPPs):  None     Electrographic and Electroclinical seizures:  None    Other Clinical Events:  None    Activation Procedures:   -Hyperventilation was not performed.    -Photic stimulation was not performed.    Artifacts:  Intermittent myogenic and movement artifacts were noted.    ECG:  The heart rate on single channel ECG was predominantly between 60-80 BPM.    EEG Summary / Classification:  Abnormal EEG in an encephalopathic patient.  Moderate nonspecific diffuse cerebral slowing    EEG Impression / Clinical Correlate:  Abnormal EEG study.  Moderate nonspecific diffuse or multifocal cerebral dysfunction  No epileptiform discharges or seizures were recorded.    Ty Wan MD  Attending Physician, [facility]    ------------------------------------  EEG Reading Room: [phone]  On Call Service After Hours: [phone]   [facility]: [address], [place] [address], Phone: [phone]  [facility]: [address], [place] [address], Phone: [phone]  [facility]: [address] [place] [address], Phone: [phone]    Patient Name: KEE LOUIS    Age: 88 year, : 1935  MRN #: -, Jaeger: -[id number]  Referring Physician: -  EEG #: [id number]    Study Date: 2023   Start Time: 8:00:17 AM      End Date:   2023       End Time: 14:00:06     Study Duration: 6 HR    Study Information:    EEG Recording Technique:  The patient underwent continuous Video-EEG monitoring, using Telemetry System hardware on the XLTek Digital System. EEG and video data were stored on a computer hard drive with important events saved in digital archive files. The material was reviewed by a physician (electroencephalographer / epileptologist) on a daily basis. Jose and seizure detection algorithms were utilized and reviewed. An EEG Technician attended to the patient, and was available throughout daytime work hours.  The epilepsy center neurologist was available in person or on call 24-hours per day.    EEG Placement and Labeling of Electrodes:  The EEG was performed utilizing 20 channel referential EEG connections (coronal over temporal over parasagittal montage) using all standard 10-20 electrode placements with EKG, with additional electrodes placed in the inferior temporal region using the modified 10-10 montage electrode placements for elective admissions, or if deemed necessary. Recording was at a sampling rate of 256 samples per second per channel. Time synchronized digital video recording was done simultaneously with EEG recording. A low light infrared camera was used for low light recording.     History:  -Patient is a 88y old  Male who presents with a chief complaint of transfer for cardiac arrest (14 Dec 2023 08:03)    Medication  No Data.    Interpretation:    [[[Abbreviation Key:  PDR=alpha rhythm/posterior dominant rhythm. A-P=anterior posterior.  Amplitude: ‘very low’:<20; ‘low’:20-49; ‘medium’:; ‘high’:>150uV.  Persistence for periodic/rhythmic patterns (% of epoch) ‘rare’:<1%; ‘occasional’:1-10%; ‘frequent’:10-50%; ‘abundant’:50-90%; ‘continuous’:>90%.  Persistence for sporadic discharges: ‘rare’:<1/hr; ‘occasional’:1/min-1/hr; ‘frequent’:>1/min; ‘abundant’:>1/10 sec.  RPP=rhythmic and periodic patterns; GRDA=generalized rhythmic delta activity; FIRDA=frontal intermittent GRDA; LRDA=lateralized rhythmic delta activity; TIRDA=temporal intermittent rhythmic delta activity;  LPD=PLED=lateralized periodic discharges; GPD=generalized periodic discharges; BIPDs =bilateral independent periodic discharges; Mf=multifocal; SIRPDs=stimulus induced rhythmic, periodic, or ictal appearing discharges; BIRDs=brief potentially ictal rhythmic discharges >4 Hz, lasting .5-10s; PFA (paroxysmal bursts >13 Hz or =8 Hz <10s).  Modifiers: +F=with fast component; +S=with spike component; +R=with rhythmic component.  S-B=burst suppression pattern.  Max=maximal. N1-drowsy; N2-stage II sleep; N3-slow wave sleep. SSS/BETS=small sharp spikes/benign epileptiform transients of sleep. HV=hyperventilation; PS=photic stimulation]]]      Daily EEG Visual Analysis    FINDINGS:      Background:  Symmetry: symmetric  Continuous: continuous  PDR: symmetric, well-modulated 6.5-7 Hz activity, with amplitude to 40 uV, that attenuated to eye opening.  Low amplitude frontal beta noted in wakefulness.  Reactivity: present  Voltage: normal, [defined typically between 20-150uV]  Anterior Posterior Gradient: present  Breach: absent    Background Slowing:  Generalized slowing: There was continuous generalized polymorphic theta>delta activity, at times rhythmic at 4-5 Hz, lasting 2-5 sec.  Focal slowing: none was present.    State Changes:   -Drowsiness was characterized by fragmentation, attenuation, and slowing of the background activity.    -N2 sleep transients with symmetric spindles and K-complexes.    Sporadic Epileptiform Discharges:   None    Rhythmic and Periodic Patterns (RPPs):  None     Electrographic and Electroclinical seizures:  None    Other Clinical Events:  None    Activation Procedures:   -Hyperventilation was not performed.    -Photic stimulation was not performed.    Artifacts:  Intermittent myogenic and movement artifacts were noted.    ECG:  The heart rate on single channel ECG was predominantly between 60-80 BPM.    EEG Summary / Classification:  Abnormal EEG in an encephalopathic patient.  Moderate nonspecific diffuse cerebral slowing    EEG Impression / Clinical Correlate:  Abnormal EEG study.  Moderate nonspecific diffuse or multifocal cerebral dysfunction  No epileptiform discharges or seizures were recorded.    Ty Wan MD  Attending Physician, [facility]    ------------------------------------  EEG Reading Room: [phone]  On Call Service After Hours: [phone]

## 2023-12-18 NOTE — RAPID RESPONSE TEAM SUMMARY - NSSITUATIONBACKGROUNDRRT_GEN_ALL_CORE
87 YO M with MHx of HTN and DM [facility] found to be in [facility]ib  c/b bradycardic/PEA arrest w/ ROSC after 8 minutes with 1 epi,  now transferred to [facility] for cardiac eval. Code blue called.    ROSC was achieved after 9 minutes, epi x3, calcium x1, bicarb x1. Initial rhythm asystole, subsequent rhythm with VFib, shocked x1. Pt started on amio gtt. 89 YO M with MHx of HTN and DM [facility] to [facility]  c/b bradycardic/PEA arrest w/ ROSC after 8 minutes with 1 epi,  now transferred to [facility] for cardiac eval. Code blue called.    ROSC was achieved after 9 minutes, epi x3, calcium x1, bicarb x1. Initial rhythm asystole, subsequent rhythm with VFib, shocked x1. Pt started on amio gtt.

## 2023-12-18 NOTE — CHART NOTE - NSCHARTNOTEFT_GEN_A_CORE
Notified by RN that Pt increasingly hypotensive to 60-70s systolic on Arterial line off pressors, HR 120s. Pt evaluated at bedside, he remained delirious, very restless/anxious, complaining of chest pain and SOB, warm extremities, heart RRR, lungs CTA b/l, no accessory muscle use, SpO2 100% on RA, abdomen soft, distended (baseline per RN), +bowel sounds, no LE edema, brown non-bloody stool noted on sun. POCUS revealing for moderate-severely decreased LV Systolic function, ?septal WMA, VTI 10, LV>RV, IVC small w/ complete inspiratory collapse, A-Lines b/l w/ b/l lung sliding anteriorly. 1L LR administered, for hypotension, pocus s/p w/ indeterminate IVC, scattered B lines on left side, A line anteriorly on right, Pt started on Phenylephrine. EKG w/ new LBBB, given newly decreased LV Systolic function on POCUS, new LBBB, chest pain, and acute hypotension CCU c/s called. Initial Troponin 747, Cardiology PA and Fellow evaluated Notified by RN that Pt increasingly hypotensive to 60-70s systolic on Arterial line off pressors, HR 120s. Pt evaluated at bedside, he remained delirious, very restless/anxious, complaining of chest pain and SOB, warm extremities, heart RRR, lungs CTA b/l, no accessory muscle use, SpO2 100% on RA, abdomen soft, distended (baseline per RN), +bowel sounds, no LE edema, brown non-bloody stool noted on sun. POCUS revealing for moderate-severely decreased LV Systolic function, ?septal WMA, VTI 10, LV>RV, IVC small w/ complete inspiratory collapse, A-Lines b/l w/ b/l lung sliding anteriorly. 1L LR administered, for hypotension, pocus s/p w/ indeterminate IVC, scattered B lines on left side, A line anteriorly on right, Pt started on Phenylephrine and Norepinephrine. EKG w/ new LBBB, given newly decreased LV Systolic function on POCUS, new LBBB, chest pain, and acute hypotension CCU c/s called. Initial Troponin 747, Cardiology PA and Fellow evaluated Pt at bedside, appreciated new LV dysfunction and EKG changes, but given Pt w/ warm extremities, IVC 2.0 cm and collapsable, believed the picture to be inconsistent w/ Cardiogenic or obstructive shock, agreed with Levo/Yordan use and recommended more IVF for possible septic shock. Shock c/s called by MICU Dr Love. Pt started on ACS Hep gtt and ASA loaded for primary team concern of acute coronary syndrome w/ uptrending Troponins though may represent demand ischemia. Hgb downtrended from 7.7 to 6.9 after IVF but no signs of bleeding, Hgb recovered to 7.5 without PRBC but given 1U PRBC for goal >8 given Cardiac Dz Hx. Additional Liter of IVF administered w/ 1 Amp Bicarb for acute hypotension on pressors while R IJ CVC was placed without complication. Vasopressin added to previous pressors initially w/ some improvement in Levophed requirements but ultimately titrated back up. Repeat POCUS s/p 2nd Liter of IVF w/ confluent B lines b/l though Pt still w/ SpO2 high 90s on RA and CXR ordered. Pt with decreasing urine output i/s/o shock state w/ worsening metabolic acidosis i/s/o increasing lactate, ?euglycemic DKA on insulin gtt/D5LR, and worsening renal failure. Given Pt w/ Hx Afib at [facility] not on anticoagulation, though Telemetry review demonstrates NSR throughout hospital stay at [facility], CTA A/P ordered to r/o mesenteric ischemia as well at CT Chest and Head given undifferentiated shock state. At approximately 3:45am, Pt noted to be agonally breathing and subsequently lost a pulse. Pt initially w/ Asystole Cardiac arrest, received Epi x 3 converting to V Fib, shocked at 200J, converting to Wide complex Tachycardia w/ a pulse after 9 minutes of downtime as well at 2 Amps of Sodium Bicarb, and Calcium, Intubated by Anesthesia. Amio loaded and started on an Amiodarone gtt, Dobutamine gtt, Bicarb gtt for severe metabolic acidosis. Pt agitated after code, given Versed pushes and started on a Propofol gtt, Pt compensating, overbreathing on Ventilator, PERRLA. D/w Daughter Emerita Campbell at bedside throughout the night regarding risks/benefits of tests, procedures, and Pts grave prognosis, and she agrees to make Pt DNR given poor prognosis w/ 2 recent cardiac arrests. Pt evaluated by MICU Dr Love at bedside throughout night, in agreement w/ assessment and plan. Will continue to monitor Notified by RN that Pt increasingly hypotensive to 60-70s systolic on Arterial line off pressors, HR 120s. Pt evaluated at bedside, he remained delirious, very restless/anxious, complaining of chest pain and SOB, warm extremities, heart RRR, lungs CTA b/l, no accessory muscle use, SpO2 100% on RA, abdomen soft, distended (baseline per RN), +bowel sounds, no LE edema, brown non-bloody stool noted on sun. POCUS revealing for moderate-severely decreased LV Systolic function, ?septal WMA, VTI 10, LV>RV, IVC small w/ complete inspiratory collapse, A-Lines b/l w/ b/l lung sliding anteriorly. 1L LR administered, for hypotension, pocus s/p w/ indeterminate IVC, scattered B lines on left side, A line anteriorly on right, Pt started on Phenylephrine and Norepinephrine. EKG w/ new LBBB, given newly decreased LV Systolic function on POCUS, new LBBB, chest pain, and acute hypotension CCU c/s called. Initial Troponin 747, Cardiology PA and Fellow evaluated Pt at bedside, appreciated new LV dysfunction and EKG changes, but given Pt w/ warm extremities, IVC 2.0 cm and collapsable, believed the picture to be inconsistent w/ Cardiogenic or obstructive shock, agreed with Levo/Yordan use and recommended more IVF for possible septic shock. Shock c/s called by MICU Dr Love. Pt started on ACS Hep gtt and ASA loaded for primary team concern of acute coronary syndrome w/ uptrending Troponins though may represent demand ischemia. Hgb downtrended from 7.7 to 6.9 after IVF but no signs of bleeding, Hgb recovered to 7.5 without PRBC but given 1U PRBC for goal >8 given Cardiac Dz Hx. Additional Liter of IVF administered w/ 1 Amp Bicarb for acute hypotension on pressors while R IJ CVC was placed without complication. Vasopressin added to previous pressors initially w/ some improvement in Levophed requirements but ultimately titrated back up. Repeat POCUS s/p 2nd Liter of IVF w/ confluent B lines b/l though Pt still w/ SpO2 high 90s on RA and CXR ordered. Pt with decreasing urine output i/s/o shock state w/ worsening metabolic acidosis i/s/o increasing lactate, ?euglycemic DKA on insulin gtt/D5LR, and worsening renal failure. Given Pt w/ Hx Afib at [facility] not on anticoagulation, though Telemetry review demonstrates NSR throughout hospital stay at [facility], CTA A/P ordered to r/o mesenteric ischemia as well at CT Chest and Head given undifferentiated shock state. At approximately 3:45am, Pt noted to be agonally breathing and subsequently lost a pulse. Pt initially w/ Asystole Cardiac arrest, received Epi x 3 converting to V Fib, shocked at 200J, converting to Wide complex Tachycardia w/ a pulse after 9 minutes of downtime as well at 2 Amps of Sodium Bicarb, and Calcium, Intubated by Anesthesia. Amio loaded and started on an Amiodarone gtt, Dobutamine gtt, Bicarb gtt for severe metabolic acidosis. Pt agitated after code, given Versed pushes and started on a Propofol gtt, Pt compensating, overbreathing on Ventilator, PERRLA. D/w Daughter Emerita Campbell at bedside throughout the night regarding risks/benefits of tests, procedures, and Pts grave prognosis, and she agrees to make Pt DNR given poor prognosis w/ 2 recent cardiac arrests. Pt evaluated by MICU Dr Love at bedside throughout night, in agreement w/ assessment and plan. Will continue to monitor

## 2023-12-18 NOTE — PROVIDER CONTACT NOTE (EICU) - SITUATION
89 YO M with MHx of HTN and DM who [facility] found to be in [facility]  c/b bradycardic/PEA arrest w/ ROSC after 8 minutes with 1 epi,  now transferred to [facility] for cardiac eval.  CGS activated, form completed. 89 YO M with MHx of HTN and DM [facility] to be [facility]  c/b bradycardic/PEA arrest w/ ROSC after 8 minutes with 1 epi,  now transferred to [facility] for cardiac eval.  CGS activated, form completed.

## 2023-12-18 NOTE — CONSULT NOTE ADULT - SUBJECTIVE AND OBJECTIVE BOX
HPI: Mr. Campbell is a 87yo man w/ HTN and DM who presented to [facility] after episode of LOC found to be in AFib w/ RVR w/ anterolateral STDs c/b another bradycardic/PEA arrest w/ ROSC now transferred to [facility] found to have elevated trop, ALEXI, leukocytosis w/ EKG now showing sinus tach w/o ischemic changes, w/ AG metabolic acidosis w/ elevated BHB c/f euglycemic DKA admitted to the MICU for further care. Cardiology is consulted iso STDs seen during AFib w/ RVR and troponin elevation. PT was evaluated by cardiology and was treated conservatively for troponemia likely in setting of metabolic acidosis and plan for ischemic evaluation once more stable.     Interval events.   CCU consulted ?STEMI.  Earlier today pt became hypotensive and not responding to fluids and increased pressor requirements. EKG showing NSR, new LBB and ST depressions in leads II,III, aVF and V5, V6 and POCUS suggestive of new LV dysfunction, CCU consulted for ?STEMI. In MICU, pt is afebrile, diaphoretic, c/o chest pain and appears confused which has been his baseline for past few days.     ROS as above    T(C): 37.6 (12-17-23 @ 20:00), Max: 37.6 (12-17-23 @ 06:00)  HR: 126 (12-18-23 @ 00:00) (99 - 126)  BP: --  RR: 28 (12-18-23 @ 00:00) (19 - 30)  SpO2: 100% (12-18-23 @ 00:00) (94% - 100%)    MEDICATIONS  (STANDING):  aspirin Suppository 300 milliGRAM(s) Rectal once  chlorhexidine 2% Cloths 1 Application(s) Topical two times a day  dextrose 5% + lactated ringers. 1000 milliLiter(s) (75 mL/Hr) IV Continuous <Continuous>  dextrose 5%. 1000 milliLiter(s) (50 mL/Hr) IV Continuous <Continuous>  dextrose 50% Injectable 25 Gram(s) IV Push once  furosemide   Injectable 40 milliGRAM(s) IV Push once  glucagon  Injectable 1 milliGRAM(s) IntraMuscular once  heparin   Injectable 5000 Unit(s) SubCutaneous every 8 hours  heparin  Infusion.  Unit(s)/Hr (7.5 mL/Hr) IV Continuous <Continuous>  influenza  Vaccine (HIGH DOSE) 0.7 milliLiter(s) IntraMuscular once  insulin regular Infusion 1 Unit(s)/Hr (1 mL/Hr) IV Continuous <Continuous>  melatonin 3 milliGRAM(s) Oral at bedtime  norepinephrine Infusion 0.05 MICROgram(s)/kG/Min (5.88 mL/Hr) IV Continuous <Continuous>  petrolatum Ophthalmic Ointment 1 Application(s) Both EYES two times a day  phenylephrine    Infusion 1 MICROgram(s)/kG/Min (11.8 mL/Hr) IV Continuous <Continuous>  piperacillin/tazobactam IVPB.. 3.375 Gram(s) IV Intermittent every 12 hours    MEDICATIONS  (PRN):  dextrose Oral Gel 15 Gram(s) Oral once PRN Blood Glucose LESS THAN 70 milliGRAM(s)/deciliter  heparin   Injectable 3800 Unit(s) IV Push every 6 hours PRN For aPTT less than 40      I&O's Summary    16 Dec 2023 07:01  -  17 Dec 2023 07:00  --------------------------------------------------------  IN: 702.2 mL / OUT: 1650 mL / NET: -947.8 mL    17 Dec 2023 07:01  -  18 Dec 2023 00:53  --------------------------------------------------------  IN: 1728.1 mL / OUT: 215 mL / NET: 1513.1 mL        ABG - ( 17 Dec 2023 22:11 )  pH, Arterial: 7.39  pH, Blood: x     /  pCO2: 24    /  pO2: 129   / HCO3: 14    / Base Excess: -9.4  /  SaO2: 99.6                              6.9                  139  | 13   | 23           7.19  >-----------< 254     ------------------------< 157                   20.9                 4.4  | 107  | 2.19                                         Ca 9.0   Mg 2.10  Ph 3.4    Urinalysis Basic - ( 17 Dec 2023 22:11 )    Color: x / Appearance: x / SG: x / pH: x  Gluc: 157 mg/dL / Ketone: x  / Bili: x / Urobili: x   Blood: x / Protein: x / Nitrite: x   Leuk Esterase: x / RBC: x / WBC x   Sq Epi: x / Non Sq Epi: x / Bacteria: x        PT/INR - ( 17 Dec 2023 22:11 )   PT: 11.4 sec;   INR: 1.01 ratio              HPI: Mr. Campbell is a 89yo man w/ HTN and DM who [facility] found to be in AFib w/ RVR w/ anterolateral STDs c/b another bradycardic/PEA arrest w/ ROSC now transferred to [facility] found to have elevated trop, ALEXI, leukocytosis w/ EKG now showing sinus tach w/o ischemic changes, w/ AG metabolic acidosis w/ elevated BHB c/f euglycemic DKA admitted to the MICU for further care. Cardiology is consulted iso STDs seen during AFib w/ RVR and troponin elevation. PT was evaluated by cardiology and was treated conservatively for troponemia likely in setting of metabolic acidosis and plan for ischemic evaluation once more stable.     Interval events.   CCU consulted ?STEMI.  Earlier today pt became hypotensive and not responding to fluids and increased pressor requirements. EKG showing NSR, new LBB and ST depressions in leads II,III, aVF and V5, V6 and POCUS suggestive of new LV dysfunction, CCU consulted for ?STEMI. In MICU, pt is afebrile, diaphoretic, c/o chest pain and appears confused which has been his baseline for past few days.     ROS as above    T(C): 37.6 (12-17-23 @ 20:00), Max: 37.6 (12-17-23 @ 06:00)  HR: 126 (12-18-23 @ 00:00) (99 - 126)  BP: --  RR: 28 (12-18-23 @ 00:00) (19 - 30)  SpO2: 100% (12-18-23 @ 00:00) (94% - 100%)    MEDICATIONS  (STANDING):  aspirin Suppository 300 milliGRAM(s) Rectal once  chlorhexidine 2% Cloths 1 Application(s) Topical two times a day  dextrose 5% + lactated ringers. 1000 milliLiter(s) (75 mL/Hr) IV Continuous <Continuous>  dextrose 5%. 1000 milliLiter(s) (50 mL/Hr) IV Continuous <Continuous>  dextrose 50% Injectable 25 Gram(s) IV Push once  furosemide   Injectable 40 milliGRAM(s) IV Push once  glucagon  Injectable 1 milliGRAM(s) IntraMuscular once  heparin   Injectable 5000 Unit(s) SubCutaneous every 8 hours  heparin  Infusion.  Unit(s)/Hr (7.5 mL/Hr) IV Continuous <Continuous>  influenza  Vaccine (HIGH DOSE) 0.7 milliLiter(s) IntraMuscular once  insulin regular Infusion 1 Unit(s)/Hr (1 mL/Hr) IV Continuous <Continuous>  melatonin 3 milliGRAM(s) Oral at bedtime  norepinephrine Infusion 0.05 MICROgram(s)/kG/Min (5.88 mL/Hr) IV Continuous <Continuous>  petrolatum Ophthalmic Ointment 1 Application(s) Both EYES two times a day  phenylephrine    Infusion 1 MICROgram(s)/kG/Min (11.8 mL/Hr) IV Continuous <Continuous>  piperacillin/tazobactam IVPB.. 3.375 Gram(s) IV Intermittent every 12 hours    MEDICATIONS  (PRN):  dextrose Oral Gel 15 Gram(s) Oral once PRN Blood Glucose LESS THAN 70 milliGRAM(s)/deciliter  heparin   Injectable 3800 Unit(s) IV Push every 6 hours PRN For aPTT less than 40      I&O's Summary    16 Dec 2023 07:01  -  17 Dec 2023 07:00  --------------------------------------------------------  IN: 702.2 mL / OUT: 1650 mL / NET: -947.8 mL    17 Dec 2023 07:01  -  18 Dec 2023 00:53  --------------------------------------------------------  IN: 1728.1 mL / OUT: 215 mL / NET: 1513.1 mL        ABG - ( 17 Dec 2023 22:11 )  pH, Arterial: 7.39  pH, Blood: x     /  pCO2: 24    /  pO2: 129   / HCO3: 14    / Base Excess: -9.4  /  SaO2: 99.6                              6.9                  139  | 13   | 23           7.19  >-----------< 254     ------------------------< 157                   20.9                 4.4  | 107  | 2.19                                         Ca 9.0   Mg 2.10  Ph 3.4    Urinalysis Basic - ( 17 Dec 2023 22:11 )    Color: x / Appearance: x / SG: x / pH: x  Gluc: 157 mg/dL / Ketone: x  / Bili: x / Urobili: x   Blood: x / Protein: x / Nitrite: x   Leuk Esterase: x / RBC: x / WBC x   Sq Epi: x / Non Sq Epi: x / Bacteria: x        PT/INR - ( 17 Dec 2023 22:11 )   PT: 11.4 sec;   INR: 1.01 ratio

## 2023-12-18 NOTE — CHART NOTE - NSCHARTNOTEFT_GEN_A_CORE
Pt w/ worsening Shock. Given increasing Lactate >11 and Hx Afib off Anticoagulation, will Pan-Scan and obtain CTA Abdomen/Pelvis to rule out mesenteric ischemia. Pt with worsening renal failure, eGFR 26, however Pt receiving fluids and currently the benefits outweigh the risks per discussion w/ MICU Dr Love. Discussed risks/benefits with Pts daughter Emerita Campbell who is in agreement and consents, all questions answered.

## 2023-12-18 NOTE — PROVIDER CONTACT NOTE (EICU) - RECOMMENDATIONS
continue workup to identify the cause of the patients acute decompensation.  Not a candidate for transfer at this time.  place CVC  repeat ECHO  repeat labs  continue hemodynamic support  GOC conversation

## 2023-12-19 LAB
NSE FLD-MCNC: 11.3 NG/ML — SIGNIFICANT CHANGE UP (ref 0–17.6)
NSE FLD-MCNC: 11.3 NG/ML — SIGNIFICANT CHANGE UP (ref 0–17.6)

## 2023-12-21 LAB
CULTURE RESULTS: SIGNIFICANT CHANGE UP
SPECIMEN SOURCE: SIGNIFICANT CHANGE UP